# Patient Record
Sex: MALE | Race: WHITE | Employment: OTHER | URBAN - NONMETROPOLITAN AREA
[De-identification: names, ages, dates, MRNs, and addresses within clinical notes are randomized per-mention and may not be internally consistent; named-entity substitution may affect disease eponyms.]

---

## 2017-02-13 ENCOUNTER — OFFICE VISIT (OUTPATIENT)
Dept: PHYSICAL MEDICINE AND REHAB | Age: 57
End: 2017-02-13

## 2017-02-13 VITALS
HEART RATE: 75 BPM | SYSTOLIC BLOOD PRESSURE: 135 MMHG | DIASTOLIC BLOOD PRESSURE: 76 MMHG | BODY MASS INDEX: 36.73 KG/M2 | HEIGHT: 74 IN | WEIGHT: 286.2 LBS

## 2017-02-13 DIAGNOSIS — G35 MULTIPLE SCLEROSIS, RELAPSING-REMITTING (HCC): Primary | ICD-10-CM

## 2017-02-13 DIAGNOSIS — M62.838 MUSCLE SPASMS OF BOTH LOWER EXTREMITIES: ICD-10-CM

## 2017-02-13 PROCEDURE — G8484 FLU IMMUNIZE NO ADMIN: HCPCS | Performed by: PSYCHIATRY & NEUROLOGY

## 2017-02-13 PROCEDURE — 3017F COLORECTAL CA SCREEN DOC REV: CPT | Performed by: PSYCHIATRY & NEUROLOGY

## 2017-02-13 PROCEDURE — 99213 OFFICE O/P EST LOW 20 MIN: CPT | Performed by: PSYCHIATRY & NEUROLOGY

## 2017-02-13 PROCEDURE — G8427 DOCREV CUR MEDS BY ELIG CLIN: HCPCS | Performed by: PSYCHIATRY & NEUROLOGY

## 2017-02-13 PROCEDURE — 1036F TOBACCO NON-USER: CPT | Performed by: PSYCHIATRY & NEUROLOGY

## 2017-02-13 PROCEDURE — G8417 CALC BMI ABV UP PARAM F/U: HCPCS | Performed by: PSYCHIATRY & NEUROLOGY

## 2017-02-13 RX ORDER — ATORVASTATIN CALCIUM 20 MG/1
20 TABLET, FILM COATED ORAL DAILY
COMMUNITY
Start: 2017-02-02

## 2017-03-14 ENCOUNTER — TELEPHONE (OUTPATIENT)
Dept: PHYSICAL MEDICINE AND REHAB | Age: 57
End: 2017-03-14

## 2017-03-23 ENCOUNTER — INITIAL CONSULT (OUTPATIENT)
Dept: PULMONOLOGY | Age: 57
End: 2017-03-23

## 2017-03-23 VITALS
OXYGEN SATURATION: 92 % | HEART RATE: 71 BPM | HEIGHT: 73 IN | WEIGHT: 285 LBS | BODY MASS INDEX: 37.77 KG/M2 | DIASTOLIC BLOOD PRESSURE: 84 MMHG | SYSTOLIC BLOOD PRESSURE: 138 MMHG

## 2017-03-23 DIAGNOSIS — G47.19 EXCESSIVE DAYTIME SLEEPINESS: Primary | ICD-10-CM

## 2017-03-23 DIAGNOSIS — G47.33 OSA (OBSTRUCTIVE SLEEP APNEA): ICD-10-CM

## 2017-03-23 DIAGNOSIS — J43.1 PANLOBULAR EMPHYSEMA (HCC): ICD-10-CM

## 2017-03-23 DIAGNOSIS — R53.83 FATIGUE, UNSPECIFIED TYPE: ICD-10-CM

## 2017-03-23 DIAGNOSIS — G47.19 EXCESSIVE DAYTIME SLEEPINESS: ICD-10-CM

## 2017-03-23 PROCEDURE — G8417 CALC BMI ABV UP PARAM F/U: HCPCS | Performed by: INTERNAL MEDICINE

## 2017-03-23 PROCEDURE — G8484 FLU IMMUNIZE NO ADMIN: HCPCS | Performed by: INTERNAL MEDICINE

## 2017-03-23 PROCEDURE — 3023F SPIROM DOC REV: CPT | Performed by: INTERNAL MEDICINE

## 2017-03-23 PROCEDURE — G8926 SPIRO NO PERF OR DOC: HCPCS | Performed by: INTERNAL MEDICINE

## 2017-03-23 PROCEDURE — 3017F COLORECTAL CA SCREEN DOC REV: CPT | Performed by: INTERNAL MEDICINE

## 2017-03-23 PROCEDURE — G8428 CUR MEDS NOT DOCUMENT: HCPCS | Performed by: INTERNAL MEDICINE

## 2017-03-23 PROCEDURE — 1036F TOBACCO NON-USER: CPT | Performed by: INTERNAL MEDICINE

## 2017-03-23 PROCEDURE — 99204 OFFICE O/P NEW MOD 45 MIN: CPT | Performed by: INTERNAL MEDICINE

## 2017-03-23 PROCEDURE — 94620 6 MIN WALK TEST: CPT | Performed by: INTERNAL MEDICINE

## 2017-03-30 DIAGNOSIS — M62.838 MUSCLE SPASMS OF BOTH LOWER EXTREMITIES: ICD-10-CM

## 2017-03-30 DIAGNOSIS — G35 MULTIPLE SCLEROSIS, RELAPSING-REMITTING (HCC): ICD-10-CM

## 2017-03-30 RX ORDER — BACLOFEN 20 MG/1
20 TABLET ORAL 2 TIMES DAILY
Qty: 60 TABLET | Refills: 8 | Status: SHIPPED | OUTPATIENT
Start: 2017-03-30 | End: 2017-08-30 | Stop reason: SDUPTHER

## 2017-04-03 DIAGNOSIS — Z99.89 OSA ON CPAP: Primary | ICD-10-CM

## 2017-04-03 DIAGNOSIS — J43.1 PANLOBULAR EMPHYSEMA (HCC): ICD-10-CM

## 2017-04-03 DIAGNOSIS — G47.33 OSA ON CPAP: Primary | ICD-10-CM

## 2017-06-20 ENCOUNTER — OFFICE VISIT (OUTPATIENT)
Dept: PULMONOLOGY | Age: 57
End: 2017-06-20

## 2017-06-20 VITALS
HEIGHT: 74 IN | TEMPERATURE: 98.2 F | BODY MASS INDEX: 36.19 KG/M2 | DIASTOLIC BLOOD PRESSURE: 84 MMHG | SYSTOLIC BLOOD PRESSURE: 148 MMHG | WEIGHT: 282 LBS | HEART RATE: 75 BPM | OXYGEN SATURATION: 97 %

## 2017-06-20 DIAGNOSIS — M35.1 OVERLAP SYNDROME (HCC): ICD-10-CM

## 2017-06-20 DIAGNOSIS — G47.30 OBESITY WITH SLEEP APNEA: ICD-10-CM

## 2017-06-20 DIAGNOSIS — G47.33 OSA ON CPAP: Primary | ICD-10-CM

## 2017-06-20 DIAGNOSIS — Z99.89 OSA ON CPAP: Primary | ICD-10-CM

## 2017-06-20 DIAGNOSIS — I27.20 MILD PULMONARY HYPERTENSION (HCC): ICD-10-CM

## 2017-06-20 DIAGNOSIS — E66.9 OBESITY WITH SLEEP APNEA: ICD-10-CM

## 2017-06-20 DIAGNOSIS — J43.2 CENTRILOBULAR EMPHYSEMA (HCC): ICD-10-CM

## 2017-06-20 DIAGNOSIS — J96.11 CHRONIC HYPOXEMIC RESPIRATORY FAILURE (HCC): ICD-10-CM

## 2017-06-20 PROCEDURE — 3023F SPIROM DOC REV: CPT | Performed by: PHYSICIAN ASSISTANT

## 2017-06-20 PROCEDURE — G8926 SPIRO NO PERF OR DOC: HCPCS | Performed by: PHYSICIAN ASSISTANT

## 2017-06-20 PROCEDURE — G8427 DOCREV CUR MEDS BY ELIG CLIN: HCPCS | Performed by: PHYSICIAN ASSISTANT

## 2017-06-20 PROCEDURE — 3017F COLORECTAL CA SCREEN DOC REV: CPT | Performed by: PHYSICIAN ASSISTANT

## 2017-06-20 PROCEDURE — G8417 CALC BMI ABV UP PARAM F/U: HCPCS | Performed by: PHYSICIAN ASSISTANT

## 2017-06-20 PROCEDURE — 1036F TOBACCO NON-USER: CPT | Performed by: PHYSICIAN ASSISTANT

## 2017-06-20 PROCEDURE — 99215 OFFICE O/P EST HI 40 MIN: CPT | Performed by: PHYSICIAN ASSISTANT

## 2017-08-30 ENCOUNTER — OFFICE VISIT (OUTPATIENT)
Dept: NEUROLOGY | Age: 57
End: 2017-08-30
Payer: MEDICARE

## 2017-08-30 VITALS
BODY MASS INDEX: 38.83 KG/M2 | HEART RATE: 70 BPM | DIASTOLIC BLOOD PRESSURE: 84 MMHG | WEIGHT: 293 LBS | HEIGHT: 73 IN | SYSTOLIC BLOOD PRESSURE: 164 MMHG

## 2017-08-30 DIAGNOSIS — G35 MULTIPLE SCLEROSIS (HCC): Primary | ICD-10-CM

## 2017-08-30 DIAGNOSIS — M62.838 MUSCLE SPASMS OF BOTH LOWER EXTREMITIES: ICD-10-CM

## 2017-08-30 DIAGNOSIS — G35 MULTIPLE SCLEROSIS, RELAPSING-REMITTING (HCC): ICD-10-CM

## 2017-08-30 PROCEDURE — G8427 DOCREV CUR MEDS BY ELIG CLIN: HCPCS | Performed by: PSYCHIATRY & NEUROLOGY

## 2017-08-30 PROCEDURE — 99213 OFFICE O/P EST LOW 20 MIN: CPT | Performed by: PSYCHIATRY & NEUROLOGY

## 2017-08-30 PROCEDURE — G8417 CALC BMI ABV UP PARAM F/U: HCPCS | Performed by: PSYCHIATRY & NEUROLOGY

## 2017-08-30 PROCEDURE — 3017F COLORECTAL CA SCREEN DOC REV: CPT | Performed by: PSYCHIATRY & NEUROLOGY

## 2017-08-30 PROCEDURE — 1036F TOBACCO NON-USER: CPT | Performed by: PSYCHIATRY & NEUROLOGY

## 2017-08-30 RX ORDER — BACLOFEN 20 MG/1
20 TABLET ORAL 2 TIMES DAILY
Qty: 60 TABLET | Refills: 8 | Status: SHIPPED | OUTPATIENT
Start: 2017-08-30 | End: 2022-06-27 | Stop reason: SDUPTHER

## 2017-09-07 ENCOUNTER — HOSPITAL ENCOUNTER (OUTPATIENT)
Dept: MRI IMAGING | Age: 57
Discharge: HOME | End: 2017-09-07
Payer: MEDICARE

## 2017-09-07 DIAGNOSIS — G35 MULTIPLE SCLEROSIS (HCC): ICD-10-CM

## 2017-09-07 LAB — POC CREATININE WHOLE BLOOD: 0.7 MG/DL (ref 0.5–1.2)

## 2017-09-07 PROCEDURE — 82565 ASSAY OF CREATININE: CPT

## 2017-09-07 PROCEDURE — 70553 MRI BRAIN STEM W/O & W/DYE: CPT

## 2017-09-07 PROCEDURE — A9579 GAD-BASE MR CONTRAST NOS,1ML: HCPCS | Performed by: PSYCHIATRY & NEUROLOGY

## 2017-09-07 PROCEDURE — 6360000004 HC RX CONTRAST MEDICATION: Performed by: PSYCHIATRY & NEUROLOGY

## 2017-09-07 RX ADMIN — GADOTERIDOL 20 ML: 279.3 INJECTION, SOLUTION INTRAVENOUS at 08:06

## 2017-09-14 DIAGNOSIS — J43.2 CENTRILOBULAR EMPHYSEMA (HCC): ICD-10-CM

## 2017-09-14 DIAGNOSIS — M35.1 OVERLAP SYNDROME (HCC): ICD-10-CM

## 2017-09-14 DIAGNOSIS — J96.11 CHRONIC HYPOXEMIC RESPIRATORY FAILURE (HCC): ICD-10-CM

## 2017-09-14 DIAGNOSIS — G47.30 OBESITY WITH SLEEP APNEA: ICD-10-CM

## 2017-09-14 DIAGNOSIS — E66.9 OBESITY WITH SLEEP APNEA: ICD-10-CM

## 2017-09-19 ENCOUNTER — OFFICE VISIT (OUTPATIENT)
Dept: PULMONOLOGY | Age: 57
End: 2017-09-19
Payer: MEDICARE

## 2017-09-19 VITALS
BODY MASS INDEX: 37.74 KG/M2 | DIASTOLIC BLOOD PRESSURE: 88 MMHG | WEIGHT: 284.8 LBS | HEIGHT: 73 IN | HEART RATE: 68 BPM | SYSTOLIC BLOOD PRESSURE: 138 MMHG | OXYGEN SATURATION: 97 % | TEMPERATURE: 97.4 F

## 2017-09-19 DIAGNOSIS — J96.11 CHRONIC RESPIRATORY FAILURE WITH HYPOXIA (HCC): ICD-10-CM

## 2017-09-19 DIAGNOSIS — J43.2 CENTRILOBULAR EMPHYSEMA (HCC): Primary | ICD-10-CM

## 2017-09-19 DIAGNOSIS — Z99.89 OSA ON CPAP: ICD-10-CM

## 2017-09-19 DIAGNOSIS — G47.33 OSA ON CPAP: ICD-10-CM

## 2017-09-19 DIAGNOSIS — G35 MULTIPLE SCLEROSIS, RELAPSING-REMITTING (HCC): Chronic | ICD-10-CM

## 2017-09-19 PROCEDURE — G8926 SPIRO NO PERF OR DOC: HCPCS | Performed by: PHYSICIAN ASSISTANT

## 2017-09-19 PROCEDURE — 1036F TOBACCO NON-USER: CPT | Performed by: PHYSICIAN ASSISTANT

## 2017-09-19 PROCEDURE — 99214 OFFICE O/P EST MOD 30 MIN: CPT | Performed by: PHYSICIAN ASSISTANT

## 2017-09-19 PROCEDURE — G8417 CALC BMI ABV UP PARAM F/U: HCPCS | Performed by: PHYSICIAN ASSISTANT

## 2017-09-19 PROCEDURE — 3023F SPIROM DOC REV: CPT | Performed by: PHYSICIAN ASSISTANT

## 2017-09-19 PROCEDURE — 3017F COLORECTAL CA SCREEN DOC REV: CPT | Performed by: PHYSICIAN ASSISTANT

## 2017-09-19 PROCEDURE — G8427 DOCREV CUR MEDS BY ELIG CLIN: HCPCS | Performed by: PHYSICIAN ASSISTANT

## 2017-09-19 ASSESSMENT — ENCOUNTER SYMPTOMS
BACK PAIN: 0
HEMOPTYSIS: 0
GASTROINTESTINAL NEGATIVE: 1
SORE THROAT: 0
NAUSEA: 0
SPUTUM PRODUCTION: 0
WHEEZING: 1
VOMITING: 0
COUGH: 1
SHORTNESS OF BREATH: 1
HEARTBURN: 0
EYES NEGATIVE: 1

## 2017-09-20 ENCOUNTER — HOSPITAL ENCOUNTER (OUTPATIENT)
Dept: SLEEP CENTER | Age: 57
Discharge: HOME | End: 2017-09-20
Payer: MEDICARE

## 2017-09-20 VITALS — HEART RATE: 68 BPM | RESPIRATION RATE: 97 BRPM | BODY MASS INDEX: 37.64 KG/M2 | WEIGHT: 284 LBS | HEIGHT: 73 IN

## 2017-09-20 DIAGNOSIS — Z99.89 OSA ON CPAP: ICD-10-CM

## 2017-09-20 DIAGNOSIS — J96.11 CHRONIC RESPIRATORY FAILURE WITH HYPOXIA (HCC): ICD-10-CM

## 2017-09-20 DIAGNOSIS — G47.33 OSA ON CPAP: ICD-10-CM

## 2017-09-20 PROCEDURE — 95811 POLYSOM 6/>YRS CPAP 4/> PARM: CPT

## 2017-09-21 DIAGNOSIS — Z99.89 OSA ON CPAP: Primary | ICD-10-CM

## 2017-09-21 DIAGNOSIS — G47.33 OSA ON CPAP: Primary | ICD-10-CM

## 2017-09-21 LAB — STATUS: NORMAL

## 2017-09-28 ENCOUNTER — TELEPHONE (OUTPATIENT)
Dept: SLEEP CENTER | Age: 57
End: 2017-09-28

## 2017-11-06 RX ORDER — GLATIRAMER ACETATE 20 MG/ML
INJECTION, SOLUTION SUBCUTANEOUS
Qty: 30 KIT | Refills: 10 | Status: SHIPPED | OUTPATIENT
Start: 2017-11-06 | End: 2018-09-11 | Stop reason: SDUPTHER

## 2017-11-16 ENCOUNTER — OFFICE VISIT (OUTPATIENT)
Dept: PULMONOLOGY | Age: 57
End: 2017-11-16
Payer: MEDICARE

## 2017-11-16 VITALS
SYSTOLIC BLOOD PRESSURE: 132 MMHG | OXYGEN SATURATION: 96 % | DIASTOLIC BLOOD PRESSURE: 78 MMHG | BODY MASS INDEX: 38.43 KG/M2 | WEIGHT: 290 LBS | HEIGHT: 73 IN | HEART RATE: 67 BPM

## 2017-11-16 DIAGNOSIS — G35 MULTIPLE SCLEROSIS, RELAPSING-REMITTING (HCC): Chronic | ICD-10-CM

## 2017-11-16 DIAGNOSIS — G47.33 OSA ON CPAP: Primary | ICD-10-CM

## 2017-11-16 DIAGNOSIS — J96.11 CHRONIC RESPIRATORY FAILURE WITH HYPOXIA (HCC): ICD-10-CM

## 2017-11-16 DIAGNOSIS — J43.2 CENTRILOBULAR EMPHYSEMA (HCC): ICD-10-CM

## 2017-11-16 DIAGNOSIS — Z99.89 OSA ON CPAP: Primary | ICD-10-CM

## 2017-11-16 PROCEDURE — 3023F SPIROM DOC REV: CPT | Performed by: PHYSICIAN ASSISTANT

## 2017-11-16 PROCEDURE — 1036F TOBACCO NON-USER: CPT | Performed by: PHYSICIAN ASSISTANT

## 2017-11-16 PROCEDURE — G8417 CALC BMI ABV UP PARAM F/U: HCPCS | Performed by: PHYSICIAN ASSISTANT

## 2017-11-16 PROCEDURE — 99214 OFFICE O/P EST MOD 30 MIN: CPT | Performed by: PHYSICIAN ASSISTANT

## 2017-11-16 PROCEDURE — G8926 SPIRO NO PERF OR DOC: HCPCS | Performed by: PHYSICIAN ASSISTANT

## 2017-11-16 PROCEDURE — G8484 FLU IMMUNIZE NO ADMIN: HCPCS | Performed by: PHYSICIAN ASSISTANT

## 2017-11-16 PROCEDURE — G8427 DOCREV CUR MEDS BY ELIG CLIN: HCPCS | Performed by: PHYSICIAN ASSISTANT

## 2017-11-16 PROCEDURE — 3017F COLORECTAL CA SCREEN DOC REV: CPT | Performed by: PHYSICIAN ASSISTANT

## 2017-11-16 NOTE — PROGRESS NOTES
Medical History:   Diagnosis Date    Adjustment disorder     Bypass graft stenosis ([facility] 2008    Chronic fatigue     COPD (chronic obstructive pulmonary disease) (HCC)     Demyelinating disease of central nervous system ([facility]     Depression     Essential hypertension     Hyperlipidemia     Hypertension     Hypoxia     Multiple sclerosis ([facility] 2012    RODRIGUE (obstructive sleep apnea)     Post herpetic neuralgia     Reactive airway disease     unspecified asthma severity    Type II or unspecified type diabetes mellitus without mention of complication, not stated as uncontrolled        Past Surgical History:   Procedure Laterality Date    CORONARY ARTERY BYPASS GRAFT      HERNIA REPAIR         Social History   Substance Use Topics    Smoking status: Former Smoker     Types: Cigarettes     Quit date: 9/27/2008    Smokeless tobacco: Never Used    Alcohol use Yes      Comment: occas.        No Known Allergies    Current Outpatient Prescriptions   Medication Sig Dispense Refill    COPAXONE 20 MG/ML injection INJECT 1ML SUBCUTANEOUSLY DAILY -REFRIGERATE DO NOT FREEZE 30 kit 10    baclofen (LIORESAL) 20 MG tablet Take 1 tablet by mouth 2 times daily 60 tablet 8    Umeclidinium Bromide (INCRUSE ELLIPTA) 62.5 MCG/INH AEPB Inhale 1 puff into the lungs daily 1 each 11    atorvastatin (LIPITOR) 20 MG tablet Take 20 mg by mouth daily      Multiple Vitamin (MULTI VITAMIN DAILY PO) Take by mouth daily      albuterol (PROVENTIL) (2.5 MG/3ML) 0.083% nebulizer solution Take 2.5 mg by nebulization 4 times daily      SYMBICORT 160-4.5 MCG/ACT AERO       amLODIPine (NORVASC) 5 MG tablet Take 10 mg by mouth daily       losartan (COZAAR) 100 MG tablet Take 100 mg by mouth daily       modafinil (PROVIGIL) 200 MG tablet Take 200 mg by mouth daily       TRAMADOL HCL 50 mg by Does not apply route 2 times daily       metformin (GLUMETZA) 1000 MG ER tablet Take 1,000 mg by mouth 2 times daily (with meals)       Mental status: Alert, oriented, thought content appropriate      ASSESSMENT/DIAGNOSIS    1. RODRIGUE on CPAP     2. Chronic respiratory failure with hypoxia (HCC)     3. Centrilobular emphysema ([address]     4. Multiple sclerosis, relapsing-remitting (HCC)              Plan   - Continue Incruse and Symbicort  - Discontinue Ramp  - Continue O2 with exertion   - He  was advised to continue current positive airway pressure therapy with above described pressure. - He  advised to keep good compliance with current recommended pressure to get optimal results and clinical improvement  - Recommend 7-9 hours of sleep with PAP  - He was advised to call [employer] regarding supplies if needed. - He call my office for earlier appointment if needed for worsening of sleep symptoms.   - He was instructed on weight loss  - Anice All was educated about my impression and plan. Patient verbalizes understanding.   We will see Alena Wall Fee back in: 6 months with download    Christine Bermudez PA-C, KATHI  11/16/2017

## 2018-02-28 ENCOUNTER — OFFICE VISIT (OUTPATIENT)
Dept: NEUROLOGY | Age: 58
End: 2018-02-28
Payer: MEDICARE

## 2018-02-28 VITALS
BODY MASS INDEX: 39.49 KG/M2 | SYSTOLIC BLOOD PRESSURE: 138 MMHG | WEIGHT: 298 LBS | HEART RATE: 66 BPM | HEIGHT: 73 IN | DIASTOLIC BLOOD PRESSURE: 76 MMHG

## 2018-02-28 DIAGNOSIS — G35 MULTIPLE SCLEROSIS (HCC): Primary | ICD-10-CM

## 2018-02-28 DIAGNOSIS — M62.838 MUSCLE SPASMS OF BOTH LOWER EXTREMITIES: ICD-10-CM

## 2018-02-28 PROCEDURE — G8427 DOCREV CUR MEDS BY ELIG CLIN: HCPCS | Performed by: PSYCHIATRY & NEUROLOGY

## 2018-02-28 PROCEDURE — 1036F TOBACCO NON-USER: CPT | Performed by: PSYCHIATRY & NEUROLOGY

## 2018-02-28 PROCEDURE — G8484 FLU IMMUNIZE NO ADMIN: HCPCS | Performed by: PSYCHIATRY & NEUROLOGY

## 2018-02-28 PROCEDURE — 99213 OFFICE O/P EST LOW 20 MIN: CPT | Performed by: PSYCHIATRY & NEUROLOGY

## 2018-02-28 PROCEDURE — G8417 CALC BMI ABV UP PARAM F/U: HCPCS | Performed by: PSYCHIATRY & NEUROLOGY

## 2018-02-28 PROCEDURE — 3017F COLORECTAL CA SCREEN DOC REV: CPT | Performed by: PSYCHIATRY & NEUROLOGY

## 2018-02-28 RX ORDER — LINAGLIPTIN AND METFORMIN HYDROCHLORIDE 2.5; 1 MG/1; MG/1
TABLET, FILM COATED ORAL
COMMUNITY
Start: 2018-01-05

## 2018-02-28 NOTE — PROGRESS NOTES
NEUROLOGY OUT PATIENT FOLLOW UP NOTE:  2/28/201810:39 AM    Reese Reyes is here for follow up for   Patient Active Problem List   Diagnosis    [facility]remitting [facility]    Fatigue    Centrilobular emphysema ([facility]    RODRIGUE on CPAP    Chronic respiratory failure with hypoxia ([facility]   Follow up for multiple sclerosis. He reports having balance trouble when walking long distances. He feels his left leg drags at times when he is tired, he is interested in a brace. He is on nasal oxygen. He is on Copaxone 20 mg SC daily. His leg spasms are better. His sleep is better. Her is also on calcium and vitamin D. He reports no urge incontinence. ROS:  Skin: no rash. Cardiac: no chest pain. No palpitations. Renal : no flank pain, no hematuria      No Known Allergies    Current Outpatient Prescriptions:     JENTADUETO 2.5-1000 MG TABS, , Disp: , Rfl:     COPAXONE 20 MG/ML injection, INJECT 1ML SUBCUTANEOUSLY DAILY -REFRIGERATE DO NOT FREEZE, Disp: 30 kit, Rfl: 10    baclofen (LIORESAL) 20 MG tablet, Take 1 tablet by mouth 2 times daily, Disp: 60 tablet, Rfl: 8    Umeclidinium Bromide (INCRUSE ELLIPTA) 62.5 MCG/INH AEPB, Inhale 1 puff into the lungs daily, Disp: 1 each, Rfl: 11    atorvastatin (LIPITOR) 20 MG tablet, Take 20 mg by mouth daily, Disp: , Rfl:     albuterol (PROVENTIL) (2.5 MG/3ML) 0.083% nebulizer solution, Take 2.5 mg by nebulization 4 times daily, Disp: , Rfl:     SYMBICORT 160-4.5 MCG/ACT AERO, , Disp: , Rfl:     amLODIPine (NORVASC) 5 MG tablet, Take 10 mg by mouth daily , Disp: , Rfl:     losartan (COZAAR) 100 MG tablet, Take 100 mg by mouth daily , Disp: , Rfl:     modafinil (PROVIGIL) 200 MG tablet, Take 200 mg by mouth daily , Disp: , Rfl:     TRAMADOL HCL, 50 mg by Does not apply route 2 times daily , Disp: , Rfl:     metoprolol (LOPRESSOR) 50 MG tablet, Take 50 mg by mouth 2 times daily.   , Disp: , Rfl:     glimepiride (AMARYL) 4 MG tablet, Take 4 mg by mouth 2 times matter    lesions are present.       On the gradient echo T2-weighted images, there is no susceptibility artifact present.       There is no abnormal enhancement in the brain. There is no evidence of active demyelination. The flow voids are normal.  The midline craniocervical junction structures are normal.  The brainstem and pituitary gland are normal.       There is no significant change in the appearance of the brain compared to the prior study.       There is a trace amount of fluid signal in the left mastoid air cells.           Impression       1. Mild amount of abnormal signal scattered in the white matter of the brain. This is stable and consistent with multiple sclerosis. There is no evidence of active demyelination. Assessment:    1. Multiple sclerosis ([address]     2. Muscle spasms of both lower extremities      He reports feels weaker and drags his left foot when walking distances. He is interested in Brace for the left leg. I reviewed the MRI brain done 7/2017 and agree with interpretation being stable. I will refer him to [facility] for evaluation. He denies any recent fall, however he trips and has near miss on falls. He is on Copaxone 20 mg SC daily. He exam is unchanged. After a discussion with patient we agreed on the following plan. Plan:  1. Referral to [facility] re left foot brace evaluation. 2. Continue Copaxone to 20 mg subcutaneous daily. 3. Continue Baclofen to 20 mg twice a day. Refills given. 4. Take calcium and Vitamin D daily. Exercise regularly. 5.  Follow up in 2 months or sooner if needed. 6. Report any new events. Call if any questions or concerns. Please call if any questions.      Bonny Thomas MD

## 2018-02-28 NOTE — PATIENT INSTRUCTIONS
1.  Referral to [facility] re left foot brace evaluation. 2. Continue Copaxone to 20 mg subcutaneous daily. 3. Continue Baclofen to 20 mg twice a day. Refills given. 4. Take calcium and Vitamin D daily. Exercise regularly. 5.  Follow up in 2 months or sooner if needed. 6. Report any new events. Call if any questions or concerns.

## 2018-03-21 ENCOUNTER — OFFICE VISIT (OUTPATIENT)
Dept: PHYSICAL MEDICINE AND REHAB | Age: 58
End: 2018-03-21
Payer: MEDICARE

## 2018-03-21 VITALS
DIASTOLIC BLOOD PRESSURE: 79 MMHG | WEIGHT: 298.06 LBS | BODY MASS INDEX: 39.5 KG/M2 | HEART RATE: 67 BPM | HEIGHT: 73 IN | SYSTOLIC BLOOD PRESSURE: 129 MMHG

## 2018-03-21 DIAGNOSIS — R26.9 NEUROLOGIC GAIT DYSFUNCTION: ICD-10-CM

## 2018-03-21 DIAGNOSIS — G35 MULTIPLE SCLEROSIS, RELAPSING-REMITTING (HCC): Primary | Chronic | ICD-10-CM

## 2018-03-21 PROCEDURE — G8484 FLU IMMUNIZE NO ADMIN: HCPCS | Performed by: PHYSICAL MEDICINE & REHABILITATION

## 2018-03-21 PROCEDURE — G8417 CALC BMI ABV UP PARAM F/U: HCPCS | Performed by: PHYSICAL MEDICINE & REHABILITATION

## 2018-03-21 PROCEDURE — 1036F TOBACCO NON-USER: CPT | Performed by: PHYSICAL MEDICINE & REHABILITATION

## 2018-03-21 PROCEDURE — 3017F COLORECTAL CA SCREEN DOC REV: CPT | Performed by: PHYSICAL MEDICINE & REHABILITATION

## 2018-03-21 PROCEDURE — 99203 OFFICE O/P NEW LOW 30 MIN: CPT | Performed by: PHYSICAL MEDICINE & REHABILITATION

## 2018-03-21 PROCEDURE — G8427 DOCREV CUR MEDS BY ELIG CLIN: HCPCS | Performed by: PHYSICAL MEDICINE & REHABILITATION

## 2018-03-23 PROBLEM — R26.9 NEUROLOGIC GAIT DYSFUNCTION: Status: ACTIVE | Noted: 2018-03-23

## 2018-03-23 ASSESSMENT — ENCOUNTER SYMPTOMS
STRIDOR: 0
BLOOD IN STOOL: 0
DIARRHEA: 0
ABDOMINAL PAIN: 0
COUGH: 0
PHOTOPHOBIA: 0
SHORTNESS OF BREATH: 1
ORTHOPNEA: 0
BLURRED VISION: 0
HEMOPTYSIS: 0
SORE THROAT: 0
WHEEZING: 0
SPUTUM PRODUCTION: 0
DOUBLE VISION: 0
CONSTIPATION: 0
VOMITING: 0
BACK PAIN: 1
HEARTBURN: 0
NAUSEA: 0

## 2018-03-23 NOTE — PROGRESS NOTES
-REFRIGERATE DO NOT FREEZE, Disp: 30 kit, Rfl: 10    baclofen (LIORESAL) 20 MG tablet, Take 1 tablet by mouth 2 times daily, Disp: 60 tablet, Rfl: 8    Umeclidinium Bromide (INCRUSE ELLIPTA) 62.5 MCG/INH AEPB, Inhale 1 puff into the lungs daily, Disp: 1 each, Rfl: 11    atorvastatin (LIPITOR) 20 MG tablet, Take 20 mg by mouth daily, Disp: , Rfl:     Multiple Vitamin (MULTI VITAMIN DAILY PO), Take by mouth daily, Disp: , Rfl:     albuterol (PROVENTIL) (2.5 MG/3ML) 0.083% nebulizer solution, Take 2.5 mg by nebulization 4 times daily, Disp: , Rfl:     SYMBICORT 160-4.5 MCG/ACT AERO, , Disp: , Rfl:     amLODIPine (NORVASC) 5 MG tablet, Take 10 mg by mouth daily , Disp: , Rfl:     losartan (COZAAR) 100 MG tablet, Take 100 mg by mouth daily , Disp: , Rfl:     modafinil (PROVIGIL) 200 MG tablet, Take 200 mg by mouth daily , Disp: , Rfl:     TRAMADOL HCL, 50 mg by Does not apply route 2 times daily , Disp: , Rfl:     metformin (GLUMETZA) 1000 MG ER tablet, Take 1,000 mg by mouth 2 times daily (with meals) , Disp: , Rfl:     metoprolol (LOPRESSOR) 50 MG tablet, Take 50 mg by mouth 2 times daily. , Disp: , Rfl:     glimepiride (AMARYL) 4 MG tablet, Take 4 mg by mouth 2 times daily , Disp: , Rfl:     cloNIDine (CATAPRES) 0.1 MG tablet, Take 0.1 mg by mouth as needed. , Disp: , Rfl:     aspirin 81 MG tablet, Take 325 mg by mouth daily , Disp: , Rfl:      Social History:  Social History     Social History    Marital status:      Spouse name: N/A    Number of children: N/A    Years of education: N/A     Occupational History    Not on file. Social History Main Topics    Smoking status: Former Smoker     Types: Cigarettes     Quit date: 9/27/2008    Smokeless tobacco: Never Used    Alcohol use Yes      Comment: occas.     Drug use: No      Comment: younger years    Sexual activity: Not on file     Other Topics Concern    Not on file     Social History Narrative    No narrative on file ADL's: He is independent with his A.D.L.'s but requires additional time to complete all of his functional tasks. Family History:   family history includes Arthritis in his father and mother; Depression in his brother; Diabetes in his brother, father, mother, paternal grandfather, paternal grandmother, and sister; Heart Disease in his father; High Blood Pressure in his brother and father. Review of Systems:  Review of Systems   Constitutional: Positive for malaise/fatigue. Negative for chills, diaphoresis, fever and weight loss. HENT: Negative for hearing loss, nosebleeds, sore throat and tinnitus. Eyes: Negative for blurred vision, double vision and photophobia. Respiratory: Positive for shortness of breath. Negative for cough, hemoptysis, sputum production, wheezing and stridor. Cardiovascular: Negative for chest pain, palpitations, orthopnea, claudication, leg swelling and PND. Gastrointestinal: Negative for abdominal pain, blood in stool, constipation, diarrhea, heartburn, melena, nausea and vomiting. Genitourinary: Positive for urgency. Negative for dysuria, frequency and hematuria. Musculoskeletal: Positive for back pain, joint pain and myalgias. Negative for falls and neck pain. Skin: Negative for itching and rash. Neurological: Positive for sensory change, speech change and weakness. Negative for dizziness, tingling, tremors, focal weakness, seizures, loss of consciousness and headaches. Endo/Heme/Allergies: Negative for polydipsia. Does not bruise/bleed easily. Psychiatric/Behavioral: Positive for memory loss. Negative for depression, hallucinations, substance abuse and suicidal ideas. The patient is not nervous/anxious and does not have insomnia.         Physical Exam:  /79 (Site: Right Arm, Position: Sitting)   Pulse 67   Ht 6' 0.99\" (1.854 m)   Wt 298 lb 1 oz (135.2 kg)   BMI 39.33 kg/m²     He was noted to be awake, alert, and in no acute

## 2018-04-04 ENCOUNTER — OFFICE VISIT (OUTPATIENT)
Dept: PHYSICAL MEDICINE AND REHAB | Age: 58
End: 2018-04-04
Payer: MEDICARE

## 2018-04-04 VITALS
BODY MASS INDEX: 38.57 KG/M2 | WEIGHT: 291 LBS | HEART RATE: 67 BPM | SYSTOLIC BLOOD PRESSURE: 138 MMHG | HEIGHT: 73 IN | DIASTOLIC BLOOD PRESSURE: 79 MMHG

## 2018-04-04 DIAGNOSIS — G35 MULTIPLE SCLEROSIS, RELAPSING-REMITTING (HCC): Primary | Chronic | ICD-10-CM

## 2018-04-04 PROCEDURE — 3017F COLORECTAL CA SCREEN DOC REV: CPT | Performed by: PHYSICAL MEDICINE & REHABILITATION

## 2018-04-04 PROCEDURE — G8417 CALC BMI ABV UP PARAM F/U: HCPCS | Performed by: PHYSICAL MEDICINE & REHABILITATION

## 2018-04-04 PROCEDURE — 1036F TOBACCO NON-USER: CPT | Performed by: PHYSICAL MEDICINE & REHABILITATION

## 2018-04-04 PROCEDURE — G8427 DOCREV CUR MEDS BY ELIG CLIN: HCPCS | Performed by: PHYSICAL MEDICINE & REHABILITATION

## 2018-04-04 PROCEDURE — 99214 OFFICE O/P EST MOD 30 MIN: CPT | Performed by: PHYSICAL MEDICINE & REHABILITATION

## 2018-04-08 ASSESSMENT — ENCOUNTER SYMPTOMS
CONSTIPATION: 0
SHORTNESS OF BREATH: 0
ORTHOPNEA: 0
DIARRHEA: 0
VOMITING: 0
SORE THROAT: 0
DOUBLE VISION: 0
NAUSEA: 0
PHOTOPHOBIA: 0
HEMOPTYSIS: 0
COUGH: 0
BLURRED VISION: 0
BLOOD IN STOOL: 0
HEARTBURN: 0
BACK PAIN: 1
ABDOMINAL PAIN: 0
SPUTUM PRODUCTION: 0
WHEEZING: 0
STRIDOR: 0

## 2018-05-03 ENCOUNTER — OFFICE VISIT (OUTPATIENT)
Dept: PHYSICAL MEDICINE AND REHAB | Age: 58
End: 2018-05-03
Payer: MEDICARE

## 2018-05-03 VITALS
DIASTOLIC BLOOD PRESSURE: 77 MMHG | SYSTOLIC BLOOD PRESSURE: 134 MMHG | HEART RATE: 68 BPM | WEIGHT: 291 LBS | BODY MASS INDEX: 38.57 KG/M2 | HEIGHT: 73 IN

## 2018-05-03 DIAGNOSIS — G35 MULTIPLE SCLEROSIS, RELAPSING-REMITTING (HCC): Primary | ICD-10-CM

## 2018-05-03 DIAGNOSIS — R26.9 NEUROLOGIC GAIT DYSFUNCTION: ICD-10-CM

## 2018-05-03 PROCEDURE — G8417 CALC BMI ABV UP PARAM F/U: HCPCS | Performed by: NURSE PRACTITIONER

## 2018-05-03 PROCEDURE — 1036F TOBACCO NON-USER: CPT | Performed by: NURSE PRACTITIONER

## 2018-05-03 PROCEDURE — 99213 OFFICE O/P EST LOW 20 MIN: CPT | Performed by: NURSE PRACTITIONER

## 2018-05-03 PROCEDURE — 3017F COLORECTAL CA SCREEN DOC REV: CPT | Performed by: NURSE PRACTITIONER

## 2018-05-03 PROCEDURE — G8427 DOCREV CUR MEDS BY ELIG CLIN: HCPCS | Performed by: NURSE PRACTITIONER

## 2018-05-09 ENCOUNTER — OFFICE VISIT (OUTPATIENT)
Dept: NEUROLOGY | Age: 58
End: 2018-05-09
Payer: MEDICARE

## 2018-05-09 VITALS
BODY MASS INDEX: 38.04 KG/M2 | WEIGHT: 287 LBS | HEIGHT: 73 IN | HEART RATE: 68 BPM | DIASTOLIC BLOOD PRESSURE: 86 MMHG | SYSTOLIC BLOOD PRESSURE: 138 MMHG

## 2018-05-09 DIAGNOSIS — G35 MULTIPLE SCLEROSIS (HCC): Primary | ICD-10-CM

## 2018-05-09 DIAGNOSIS — M62.838 MUSCLE SPASMS OF BOTH LOWER EXTREMITIES: ICD-10-CM

## 2018-05-09 PROCEDURE — G8417 CALC BMI ABV UP PARAM F/U: HCPCS | Performed by: PSYCHIATRY & NEUROLOGY

## 2018-05-09 PROCEDURE — 1036F TOBACCO NON-USER: CPT | Performed by: PSYCHIATRY & NEUROLOGY

## 2018-05-09 PROCEDURE — 99213 OFFICE O/P EST LOW 20 MIN: CPT | Performed by: PSYCHIATRY & NEUROLOGY

## 2018-05-09 PROCEDURE — G8427 DOCREV CUR MEDS BY ELIG CLIN: HCPCS | Performed by: PSYCHIATRY & NEUROLOGY

## 2018-05-09 PROCEDURE — 3017F COLORECTAL CA SCREEN DOC REV: CPT | Performed by: PSYCHIATRY & NEUROLOGY

## 2018-05-11 ENCOUNTER — HOSPITAL ENCOUNTER (OUTPATIENT)
Dept: PHYSICAL THERAPY | Age: 58
Setting detail: THERAPIES SERIES
Discharge: HOME | End: 2018-05-11
Payer: MEDICARE

## 2018-05-11 PROCEDURE — G8979 MOBILITY GOAL STATUS: HCPCS

## 2018-05-11 PROCEDURE — 97110 THERAPEUTIC EXERCISES: CPT

## 2018-05-11 PROCEDURE — 97161 PT EVAL LOW COMPLEX 20 MIN: CPT

## 2018-05-11 PROCEDURE — G8978 MOBILITY CURRENT STATUS: HCPCS

## 2018-05-11 ASSESSMENT — PAIN DESCRIPTION - PAIN TYPE: TYPE: ACUTE PAIN

## 2018-05-11 ASSESSMENT — PAIN DESCRIPTION - LOCATION: LOCATION: BACK;LEG

## 2018-05-11 ASSESSMENT — PAIN DESCRIPTION - ORIENTATION: ORIENTATION: RIGHT;LEFT

## 2018-05-11 ASSESSMENT — PAIN SCALES - GENERAL: PAINLEVEL_OUTOF10: 7

## 2018-05-15 ENCOUNTER — HOSPITAL ENCOUNTER (OUTPATIENT)
Dept: PHYSICAL THERAPY | Age: 58
Setting detail: THERAPIES SERIES
Discharge: HOME | End: 2018-05-15
Payer: MEDICARE

## 2018-05-15 PROCEDURE — 97116 GAIT TRAINING THERAPY: CPT

## 2018-05-15 PROCEDURE — 97110 THERAPEUTIC EXERCISES: CPT

## 2018-05-15 ASSESSMENT — PAIN SCALES - GENERAL: PAINLEVEL_OUTOF10: 2

## 2018-05-16 ENCOUNTER — HOSPITAL ENCOUNTER (OUTPATIENT)
Dept: PHYSICAL THERAPY | Age: 58
Setting detail: THERAPIES SERIES
Discharge: HOME | End: 2018-05-16
Payer: MEDICARE

## 2018-05-16 PROCEDURE — 97110 THERAPEUTIC EXERCISES: CPT

## 2018-05-16 ASSESSMENT — PAIN SCALES - GENERAL: PAINLEVEL_OUTOF10: 2

## 2018-05-16 ASSESSMENT — PAIN DESCRIPTION - PAIN TYPE: TYPE: ACUTE PAIN

## 2018-05-16 ASSESSMENT — PAIN DESCRIPTION - ORIENTATION: ORIENTATION: LEFT;RIGHT

## 2018-05-16 ASSESSMENT — PAIN DESCRIPTION - LOCATION: LOCATION: BACK;LEG

## 2018-05-17 ENCOUNTER — OFFICE VISIT (OUTPATIENT)
Dept: PULMONOLOGY | Age: 58
End: 2018-05-17
Payer: MEDICARE

## 2018-05-17 VITALS
SYSTOLIC BLOOD PRESSURE: 136 MMHG | HEIGHT: 73 IN | DIASTOLIC BLOOD PRESSURE: 78 MMHG | OXYGEN SATURATION: 97 % | HEART RATE: 67 BPM | RESPIRATION RATE: 16 BRPM | WEIGHT: 280 LBS | BODY MASS INDEX: 37.11 KG/M2

## 2018-05-17 DIAGNOSIS — J96.11 CHRONIC RESPIRATORY FAILURE WITH HYPOXIA (HCC): ICD-10-CM

## 2018-05-17 DIAGNOSIS — J43.2 CENTRILOBULAR EMPHYSEMA (HCC): ICD-10-CM

## 2018-05-17 DIAGNOSIS — G47.33 OSA ON CPAP: Primary | ICD-10-CM

## 2018-05-17 DIAGNOSIS — Z99.89 OSA ON CPAP: Primary | ICD-10-CM

## 2018-05-17 PROCEDURE — 3023F SPIROM DOC REV: CPT | Performed by: PHYSICIAN ASSISTANT

## 2018-05-17 PROCEDURE — 99214 OFFICE O/P EST MOD 30 MIN: CPT | Performed by: PHYSICIAN ASSISTANT

## 2018-05-17 PROCEDURE — 3017F COLORECTAL CA SCREEN DOC REV: CPT | Performed by: PHYSICIAN ASSISTANT

## 2018-05-17 PROCEDURE — 1036F TOBACCO NON-USER: CPT | Performed by: PHYSICIAN ASSISTANT

## 2018-05-17 PROCEDURE — G8926 SPIRO NO PERF OR DOC: HCPCS | Performed by: PHYSICIAN ASSISTANT

## 2018-05-17 PROCEDURE — G8417 CALC BMI ABV UP PARAM F/U: HCPCS | Performed by: PHYSICIAN ASSISTANT

## 2018-05-17 PROCEDURE — G8427 DOCREV CUR MEDS BY ELIG CLIN: HCPCS | Performed by: PHYSICIAN ASSISTANT

## 2018-05-17 ASSESSMENT — ENCOUNTER SYMPTOMS
COUGH: 1
NAUSEA: 0
SINUS PAIN: 0
HEARTBURN: 0
SPUTUM PRODUCTION: 0
SORE THROAT: 0
GASTROINTESTINAL NEGATIVE: 1
ORTHOPNEA: 0
EYES NEGATIVE: 1
SHORTNESS OF BREATH: 1
WHEEZING: 0

## 2018-05-18 ENCOUNTER — HOSPITAL ENCOUNTER (OUTPATIENT)
Dept: PHYSICAL THERAPY | Age: 58
Setting detail: THERAPIES SERIES
Discharge: HOME | End: 2018-05-18
Payer: MEDICARE

## 2018-05-18 PROCEDURE — 97110 THERAPEUTIC EXERCISES: CPT

## 2018-05-21 ENCOUNTER — HOSPITAL ENCOUNTER (OUTPATIENT)
Dept: PHYSICAL THERAPY | Age: 58
Setting detail: THERAPIES SERIES
Discharge: HOME | End: 2018-05-21
Payer: MEDICARE

## 2018-05-21 PROCEDURE — 97110 THERAPEUTIC EXERCISES: CPT

## 2018-05-21 ASSESSMENT — PAIN SCALES - GENERAL: PAINLEVEL_OUTOF10: 3

## 2018-05-21 ASSESSMENT — PAIN DESCRIPTION - PAIN TYPE: TYPE: CHRONIC PAIN

## 2018-05-23 ENCOUNTER — HOSPITAL ENCOUNTER (OUTPATIENT)
Dept: PHYSICAL THERAPY | Age: 58
Setting detail: THERAPIES SERIES
Discharge: HOME | End: 2018-05-23
Payer: MEDICARE

## 2018-05-23 PROCEDURE — 97110 THERAPEUTIC EXERCISES: CPT

## 2018-05-23 ASSESSMENT — PAIN DESCRIPTION - LOCATION: LOCATION: OTHER (COMMENT)

## 2018-05-23 ASSESSMENT — PAIN DESCRIPTION - PAIN TYPE: TYPE: CHRONIC PAIN

## 2018-05-23 ASSESSMENT — PAIN SCALES - GENERAL: PAINLEVEL_OUTOF10: 2

## 2018-05-25 ENCOUNTER — HOSPITAL ENCOUNTER (OUTPATIENT)
Dept: PHYSICAL THERAPY | Age: 58
Setting detail: THERAPIES SERIES
Discharge: HOME | End: 2018-05-25
Payer: MEDICARE

## 2018-05-25 PROCEDURE — 97110 THERAPEUTIC EXERCISES: CPT

## 2018-05-30 ENCOUNTER — HOSPITAL ENCOUNTER (OUTPATIENT)
Dept: PHYSICAL THERAPY | Age: 58
Setting detail: THERAPIES SERIES
Discharge: HOME | End: 2018-05-30
Payer: MEDICARE

## 2018-05-30 PROCEDURE — G8978 MOBILITY CURRENT STATUS: HCPCS

## 2018-05-30 PROCEDURE — 97110 THERAPEUTIC EXERCISES: CPT

## 2018-05-30 PROCEDURE — G8979 MOBILITY GOAL STATUS: HCPCS

## 2018-06-04 ENCOUNTER — HOSPITAL ENCOUNTER (OUTPATIENT)
Dept: PHYSICAL THERAPY | Age: 58
Setting detail: THERAPIES SERIES
Discharge: HOME | End: 2018-06-04
Payer: MEDICARE

## 2018-06-04 PROCEDURE — 97110 THERAPEUTIC EXERCISES: CPT

## 2018-06-04 ASSESSMENT — PAIN SCALES - GENERAL: PAINLEVEL_OUTOF10: 3

## 2018-06-06 ENCOUNTER — HOSPITAL ENCOUNTER (OUTPATIENT)
Dept: PHYSICAL THERAPY | Age: 58
Setting detail: THERAPIES SERIES
Discharge: HOME | End: 2018-06-06
Payer: MEDICARE

## 2018-06-06 PROCEDURE — 97110 THERAPEUTIC EXERCISES: CPT

## 2018-06-06 ASSESSMENT — PAIN DESCRIPTION - LOCATION: LOCATION: GENERALIZED

## 2018-06-06 ASSESSMENT — PAIN SCALES - GENERAL: PAINLEVEL_OUTOF10: 3

## 2018-06-06 ASSESSMENT — PAIN DESCRIPTION - ORIENTATION: ORIENTATION: LEFT;RIGHT

## 2018-06-06 ASSESSMENT — PAIN DESCRIPTION - PAIN TYPE: TYPE: CHRONIC PAIN

## 2018-06-08 ENCOUNTER — HOSPITAL ENCOUNTER (OUTPATIENT)
Dept: PHYSICAL THERAPY | Age: 58
Setting detail: THERAPIES SERIES
Discharge: HOME | End: 2018-06-08
Payer: MEDICARE

## 2018-06-08 PROCEDURE — 97110 THERAPEUTIC EXERCISES: CPT

## 2018-06-08 ASSESSMENT — PAIN DESCRIPTION - PAIN TYPE: TYPE: CHRONIC PAIN

## 2018-06-08 ASSESSMENT — PAIN SCALES - GENERAL: PAINLEVEL_OUTOF10: 3

## 2018-06-08 ASSESSMENT — PAIN DESCRIPTION - LOCATION: LOCATION: GENERALIZED

## 2018-06-11 ENCOUNTER — HOSPITAL ENCOUNTER (OUTPATIENT)
Dept: PHYSICAL THERAPY | Age: 58
Setting detail: THERAPIES SERIES
Discharge: HOME | End: 2018-06-11
Payer: MEDICARE

## 2018-06-11 PROCEDURE — 97110 THERAPEUTIC EXERCISES: CPT

## 2018-06-11 ASSESSMENT — PAIN DESCRIPTION - ORIENTATION: ORIENTATION: RIGHT;LEFT

## 2018-06-11 ASSESSMENT — PAIN DESCRIPTION - LOCATION: LOCATION: BACK;LEG

## 2018-06-11 ASSESSMENT — PAIN SCALES - GENERAL: PAINLEVEL_OUTOF10: 3

## 2018-06-11 ASSESSMENT — PAIN DESCRIPTION - PAIN TYPE: TYPE: CHRONIC PAIN

## 2018-06-13 ENCOUNTER — HOSPITAL ENCOUNTER (OUTPATIENT)
Dept: PHYSICAL THERAPY | Age: 58
Setting detail: THERAPIES SERIES
Discharge: HOME | End: 2018-06-13
Payer: MEDICARE

## 2018-06-13 PROCEDURE — 97110 THERAPEUTIC EXERCISES: CPT

## 2018-06-13 ASSESSMENT — PAIN DESCRIPTION - LOCATION: LOCATION: BACK;LEG

## 2018-06-13 ASSESSMENT — PAIN DESCRIPTION - ORIENTATION: ORIENTATION: LEFT;RIGHT

## 2018-06-13 ASSESSMENT — PAIN DESCRIPTION - PAIN TYPE: TYPE: CHRONIC PAIN

## 2018-06-13 ASSESSMENT — PAIN SCALES - GENERAL: PAINLEVEL_OUTOF10: 2

## 2018-06-15 ENCOUNTER — APPOINTMENT (OUTPATIENT)
Dept: PHYSICAL THERAPY | Age: 58
End: 2018-06-15
Payer: MEDICARE

## 2018-06-18 ENCOUNTER — APPOINTMENT (OUTPATIENT)
Dept: PHYSICAL THERAPY | Age: 58
End: 2018-06-18
Payer: MEDICARE

## 2018-06-20 ENCOUNTER — HOSPITAL ENCOUNTER (OUTPATIENT)
Dept: PHYSICAL THERAPY | Age: 58
Setting detail: THERAPIES SERIES
Discharge: HOME | End: 2018-06-20
Payer: MEDICARE

## 2018-06-20 PROCEDURE — 97112 NEUROMUSCULAR REEDUCATION: CPT

## 2018-06-20 PROCEDURE — 97110 THERAPEUTIC EXERCISES: CPT

## 2018-06-20 ASSESSMENT — PAIN DESCRIPTION - LOCATION: LOCATION: BACK;LEG

## 2018-06-20 ASSESSMENT — PAIN DESCRIPTION - PAIN TYPE: TYPE: CHRONIC PAIN

## 2018-06-20 ASSESSMENT — PAIN SCALES - GENERAL: PAINLEVEL_OUTOF10: 2

## 2018-06-20 ASSESSMENT — PAIN DESCRIPTION - ORIENTATION: ORIENTATION: RIGHT;LEFT

## 2018-06-22 ENCOUNTER — HOSPITAL ENCOUNTER (OUTPATIENT)
Dept: PHYSICAL THERAPY | Age: 58
Setting detail: THERAPIES SERIES
Discharge: HOME | End: 2018-06-22
Payer: MEDICARE

## 2018-06-22 PROCEDURE — 97110 THERAPEUTIC EXERCISES: CPT

## 2018-06-22 ASSESSMENT — PAIN DESCRIPTION - LOCATION: LOCATION: BACK;LEG

## 2018-06-22 ASSESSMENT — PAIN SCALES - GENERAL: PAINLEVEL_OUTOF10: 2

## 2018-06-22 ASSESSMENT — PAIN DESCRIPTION - PAIN TYPE: TYPE: CHRONIC PAIN

## 2018-06-22 ASSESSMENT — PAIN DESCRIPTION - ORIENTATION: ORIENTATION: LEFT;RIGHT

## 2018-06-25 ENCOUNTER — HOSPITAL ENCOUNTER (OUTPATIENT)
Dept: PHYSICAL THERAPY | Age: 58
Setting detail: THERAPIES SERIES
Discharge: HOME | End: 2018-06-25
Payer: MEDICARE

## 2018-06-25 PROCEDURE — 97110 THERAPEUTIC EXERCISES: CPT

## 2018-06-25 PROCEDURE — G8980 MOBILITY D/C STATUS: HCPCS

## 2018-06-25 PROCEDURE — G8979 MOBILITY GOAL STATUS: HCPCS

## 2018-08-07 ENCOUNTER — OFFICE VISIT (OUTPATIENT)
Dept: PHYSICAL MEDICINE AND REHAB | Age: 58
End: 2018-08-07
Payer: MEDICARE

## 2018-08-07 VITALS
DIASTOLIC BLOOD PRESSURE: 70 MMHG | WEIGHT: 280 LBS | HEART RATE: 70 BPM | HEIGHT: 72 IN | BODY MASS INDEX: 37.93 KG/M2 | SYSTOLIC BLOOD PRESSURE: 157 MMHG

## 2018-08-07 DIAGNOSIS — R26.9 NEUROLOGIC GAIT DYSFUNCTION: ICD-10-CM

## 2018-08-07 DIAGNOSIS — G35 MULTIPLE SCLEROSIS, RELAPSING-REMITTING (HCC): Primary | ICD-10-CM

## 2018-08-07 PROCEDURE — 99213 OFFICE O/P EST LOW 20 MIN: CPT | Performed by: NURSE PRACTITIONER

## 2018-08-07 PROCEDURE — G8417 CALC BMI ABV UP PARAM F/U: HCPCS | Performed by: NURSE PRACTITIONER

## 2018-08-07 PROCEDURE — 3017F COLORECTAL CA SCREEN DOC REV: CPT | Performed by: NURSE PRACTITIONER

## 2018-08-07 PROCEDURE — 1036F TOBACCO NON-USER: CPT | Performed by: NURSE PRACTITIONER

## 2018-08-07 PROCEDURE — G8427 DOCREV CUR MEDS BY ELIG CLIN: HCPCS | Performed by: NURSE PRACTITIONER

## 2018-09-11 DIAGNOSIS — G35 MULTIPLE SCLEROSIS (HCC): Primary | ICD-10-CM

## 2018-09-11 RX ORDER — GLATIRAMER ACETATE 20 MG/ML
INJECTION, SOLUTION SUBCUTANEOUS
Qty: 30 KIT | Refills: 10 | Status: SHIPPED | OUTPATIENT
Start: 2018-09-11 | End: 2019-09-20 | Stop reason: SDUPTHER

## 2018-10-17 ENCOUNTER — OFFICE VISIT (OUTPATIENT)
Dept: NEUROLOGY | Age: 58
End: 2018-10-17
Payer: MEDICARE

## 2018-10-17 VITALS
BODY MASS INDEX: 37.51 KG/M2 | SYSTOLIC BLOOD PRESSURE: 158 MMHG | DIASTOLIC BLOOD PRESSURE: 82 MMHG | HEART RATE: 78 BPM | WEIGHT: 283 LBS | HEIGHT: 73 IN

## 2018-10-17 DIAGNOSIS — G35 MULTIPLE SCLEROSIS (HCC): Primary | ICD-10-CM

## 2018-10-17 DIAGNOSIS — R29.898 LEFT LEG WEAKNESS: ICD-10-CM

## 2018-10-17 PROCEDURE — G8484 FLU IMMUNIZE NO ADMIN: HCPCS | Performed by: PSYCHIATRY & NEUROLOGY

## 2018-10-17 PROCEDURE — 99213 OFFICE O/P EST LOW 20 MIN: CPT | Performed by: PSYCHIATRY & NEUROLOGY

## 2018-10-17 PROCEDURE — 3017F COLORECTAL CA SCREEN DOC REV: CPT | Performed by: PSYCHIATRY & NEUROLOGY

## 2018-10-17 PROCEDURE — 1036F TOBACCO NON-USER: CPT | Performed by: PSYCHIATRY & NEUROLOGY

## 2018-10-17 PROCEDURE — G8427 DOCREV CUR MEDS BY ELIG CLIN: HCPCS | Performed by: PSYCHIATRY & NEUROLOGY

## 2018-10-17 PROCEDURE — G8417 CALC BMI ABV UP PARAM F/U: HCPCS | Performed by: PSYCHIATRY & NEUROLOGY

## 2018-10-17 NOTE — PROGRESS NOTES
modafinil (PROVIGIL) 200 MG tablet, Take 200 mg by mouth daily , Disp: , Rfl:     TRAMADOL HCL, 50 mg by Does not apply route 2 times daily , Disp: , Rfl:     metformin (GLUMETZA) 1000 MG ER tablet, Take 1,000 mg by mouth 2 times daily (with meals) , Disp: , Rfl:     metoprolol (LOPRESSOR) 50 MG tablet, Take 50 mg by mouth 2 times daily. , Disp: , Rfl:     glimepiride (AMARYL) 4 MG tablet, Take 4 mg by mouth 2 times daily , Disp: , Rfl:     cloNIDine (CATAPRES) 0.1 MG tablet, Take 0.1 mg by mouth as needed. , Disp: , Rfl:     aspirin 81 MG tablet, Take 325 mg by mouth daily , Disp: , Rfl:       PE:   Vitals:    10/17/18 1031   BP: (!) 158/82   Site: Left Upper Arm   Position: Sitting   Cuff Size: Large Adult   Pulse: 78   Weight: 283 lb (128.4 kg)   Height: 6' 1\" (1.854 m)     General Appearance:  awake, alert, oriented, in no distress, obese. Sin: no rash  Gen: Language is Intact. Head: PERRL, EOMI, no icterus  Neck: There is no carotid bruits. The Neck is supple. Neuro: CN 2-12 grossly intact with no focal deficits. Power -5/5 left leg power, he has a left ankle brace in place. Otherwise 5/5 in all other extremities. No spasticity noted. Reflexes are symmetric. Long tracts are intact. Cerebellar exam is Intact. Sensory exam tingling both feet (unchanged). Coordination is intact. Gait is limping uses cane not ataxic. Osteo: Lumbar curvatures with no scoliosis and good ROM in all 4 extremity joints. MRI T spine: 3/2013  IMPRESSION: 1. INTERVAL RESOLUTION OF THE PREVIOUSLY IDENTIFIED CORD SIGNAL ABNORMALITIES AND ENHANCEMENT. 2. NO DEFINITE NEW THORACIC SPINAL CORD LESIONS ARE IDENTIFIED. 3. NO SIGNIFICANT THORACIC SPINAL STENOSIS. MRI brain 7/2017:   I reviewed the MRI brain and agree with interpretation.     FINDINGS:               The diffusion-weighted images are normal. The brain volume is normal.   There are no intra-or extra-axial collections.  There is no hydrocephalus, midline

## 2018-10-23 ENCOUNTER — HOSPITAL ENCOUNTER (OUTPATIENT)
Dept: MRI IMAGING | Age: 58
Discharge: HOME | End: 2018-10-23
Payer: MEDICARE

## 2018-10-23 DIAGNOSIS — G35 MULTIPLE SCLEROSIS (HCC): ICD-10-CM

## 2018-10-23 DIAGNOSIS — R29.898 LEFT LEG WEAKNESS: ICD-10-CM

## 2018-10-23 LAB — POC CREATININE WHOLE BLOOD: 0.8 MG/DL (ref 0.5–1.2)

## 2018-10-23 PROCEDURE — 72157 MRI CHEST SPINE W/O & W/DYE: CPT

## 2018-10-23 PROCEDURE — 82565 ASSAY OF CREATININE: CPT

## 2018-10-23 PROCEDURE — 6360000004 HC RX CONTRAST MEDICATION: Performed by: PSYCHIATRY & NEUROLOGY

## 2018-10-23 PROCEDURE — A9579 GAD-BASE MR CONTRAST NOS,1ML: HCPCS | Performed by: PSYCHIATRY & NEUROLOGY

## 2018-10-23 RX ADMIN — GADOTERIDOL 20 ML: 279.3 INJECTION, SOLUTION INTRAVENOUS at 08:43

## 2018-11-16 ENCOUNTER — HOSPITAL ENCOUNTER (OUTPATIENT)
Dept: PULMONOLOGY | Age: 58
Discharge: HOME | End: 2018-11-16
Payer: MEDICARE

## 2018-11-16 DIAGNOSIS — J43.2 CENTRILOBULAR EMPHYSEMA (HCC): ICD-10-CM

## 2018-11-16 PROCEDURE — 94060 EVALUATION OF WHEEZING: CPT

## 2018-11-16 PROCEDURE — 2709999900 HC NON-CHARGEABLE SUPPLY

## 2018-11-26 ENCOUNTER — OFFICE VISIT (OUTPATIENT)
Dept: PULMONOLOGY | Age: 58
End: 2018-11-26
Payer: MEDICARE

## 2018-11-26 VITALS
HEIGHT: 73 IN | SYSTOLIC BLOOD PRESSURE: 138 MMHG | TEMPERATURE: 98.1 F | BODY MASS INDEX: 37.35 KG/M2 | DIASTOLIC BLOOD PRESSURE: 78 MMHG | HEART RATE: 65 BPM | OXYGEN SATURATION: 96 % | WEIGHT: 281.8 LBS

## 2018-11-26 DIAGNOSIS — Z87.891 HISTORY OF TOBACCO ABUSE: ICD-10-CM

## 2018-11-26 DIAGNOSIS — M35.1 OVERLAP SYNDROME (HCC): ICD-10-CM

## 2018-11-26 DIAGNOSIS — J96.11 CHRONIC RESPIRATORY FAILURE WITH HYPOXIA (HCC): ICD-10-CM

## 2018-11-26 DIAGNOSIS — G47.33 OSA ON CPAP: ICD-10-CM

## 2018-11-26 DIAGNOSIS — G35 MULTIPLE SCLEROSIS, RELAPSING-REMITTING (HCC): ICD-10-CM

## 2018-11-26 DIAGNOSIS — I27.20 MILD PULMONARY HYPERTENSION (HCC): ICD-10-CM

## 2018-11-26 DIAGNOSIS — J43.2 CENTRILOBULAR EMPHYSEMA (HCC): Primary | ICD-10-CM

## 2018-11-26 DIAGNOSIS — Z99.89 OSA ON CPAP: ICD-10-CM

## 2018-11-26 DIAGNOSIS — J96.11 CHRONIC HYPOXEMIC RESPIRATORY FAILURE (HCC): ICD-10-CM

## 2018-11-26 PROCEDURE — G8427 DOCREV CUR MEDS BY ELIG CLIN: HCPCS | Performed by: NURSE PRACTITIONER

## 2018-11-26 PROCEDURE — 3017F COLORECTAL CA SCREEN DOC REV: CPT | Performed by: NURSE PRACTITIONER

## 2018-11-26 PROCEDURE — G8484 FLU IMMUNIZE NO ADMIN: HCPCS | Performed by: NURSE PRACTITIONER

## 2018-11-26 PROCEDURE — G8926 SPIRO NO PERF OR DOC: HCPCS | Performed by: NURSE PRACTITIONER

## 2018-11-26 PROCEDURE — G8417 CALC BMI ABV UP PARAM F/U: HCPCS | Performed by: NURSE PRACTITIONER

## 2018-11-26 PROCEDURE — 1036F TOBACCO NON-USER: CPT | Performed by: NURSE PRACTITIONER

## 2018-11-26 PROCEDURE — 99214 OFFICE O/P EST MOD 30 MIN: CPT | Performed by: NURSE PRACTITIONER

## 2018-11-26 PROCEDURE — 3023F SPIROM DOC REV: CPT | Performed by: NURSE PRACTITIONER

## 2018-11-26 ASSESSMENT — ENCOUNTER SYMPTOMS
SHORTNESS OF BREATH: 1
STRIDOR: 0
BACK PAIN: 0
EYES NEGATIVE: 1
WHEEZING: 0
ALLERGIC/IMMUNOLOGIC NEGATIVE: 1
DIARRHEA: 0
COUGH: 0
CHEST TIGHTNESS: 0
NAUSEA: 0

## 2018-11-26 NOTE — PROGRESS NOTES
[facility]    Patient: Montana Montes, 62 y.o.   : 1960    Pt of Dr. Parish Bean   Patient presents with    COPD     6 month follow up with Trip/ bronch 18  N- 21   MP-3        HPI  Jeannette Garcia is here for follow up for COPD. Using [employer] spirometry daily  Using Incruse with benefit, Symbicort 160/4.5 mcg, Albuterol HFA  twice daily. No ER visists or hospital visits for breathing  Using O2 as needed with activity at 2LPM- has been needing less often   On CPAP for RODRIGUE with no complaints   Weight has been stable, no appetite changes, no vision or urinary difficulties   He fells pretty good with no complaints  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     Chronic fatigue     COPD (chronic obstructive pulmonary disease) ([facility]     Demyelinating disease of central nervous system ([facility]     Depression     Essential hypertension     Hyperlipidemia     Hypertension     Hypoxia     Multiple sclerosis ([facility]     RODRIGUE (obstructive sleep apnea)     Post herpetic neuralgia     Reactive airway disease     unspecified asthma severity    Type II or unspecified type diabetes mellitus without mention of complication, not stated as uncontrolled      SURGICAL HISTORY:  Past Surgical History:   Procedure Laterality Date    CORONARY ARTERY BYPASS GRAFT      HERNIA REPAIR       SOCIAL HISTORY:  Social History   Substance Use Topics    Smoking status: Former Smoker     Packs/day: 3.00     Years: 35.00     Types: Cigarettes     Quit date: 2008    Smokeless tobacco: Never Used    Alcohol use Yes      Comment: occas.      ALLERGIES:No Known Allergies  FAMILY HISTORY:  Family History   Problem Relation Age of Onset    Arthritis Mother     Diabetes Mother     Arthritis Father     Diabetes Father     Heart Disease Father     High Blood shortness of breath (with exertion ). Negative for cough, chest tightness, wheezing and stridor. Cardiovascular: Negative for chest pain and leg swelling. Gastrointestinal: Negative for diarrhea and nausea. Endocrine: Negative. Genitourinary: Negative. Musculoskeletal: Negative. Negative for arthralgias and back pain. Skin: Negative. Allergic/Immunologic: Negative. Neurological: Negative. Negative for dizziness and light-headedness. Hematological: Negative. Psychiatric/Behavioral: Negative. All other systems reviewed and are negative. Physical exam   /78 (Site: Left Upper Arm, Position: Sitting, Cuff Size: Large Adult)   Pulse 65   Temp 98.1 °F (36.7 °C)   Ht 6' 1\" (1.854 m)   Wt 281 lb 12.8 oz (127.8 kg)   SpO2 96% Comment: on RA  BMI 37.18 kg/m²      Physical Exam   Constitutional: He is oriented to person, place, and time. He appears well-developed and well-nourished. HENT:   Head: Normocephalic and atraumatic. Mouth/Throat: Oropharynx is clear and moist. No oropharyngeal exudate. Eyes: Pupils are equal, round, and reactive to light. Conjunctivae and EOM are normal.   Neck: Normal range of motion. Neck supple. No tracheal deviation present. No thyromegaly present. Cardiovascular: Normal rate, regular rhythm and normal heart sounds. No murmur heard. Pulmonary/Chest: Effort normal. No respiratory distress. He has decreased breath sounds. He has no wheezes. He has no rales. He exhibits no tenderness. Abdominal: Soft. Bowel sounds are normal. He exhibits no distension and no mass. There is no tenderness. There is no rebound and no guarding. Musculoskeletal: Normal range of motion. He exhibits no edema or tenderness. Lymphadenopathy:     He has no cervical adenopathy. Neurological: He is alert and oriented to person, place, and time. Skin: Skin is warm and dry. Capillary refill takes less than 2 seconds.    Psychiatric: He has a normal mood and

## 2019-04-26 ENCOUNTER — OFFICE VISIT (OUTPATIENT)
Dept: NEUROLOGY | Age: 59
End: 2019-04-26
Payer: MEDICARE

## 2019-04-26 VITALS
HEART RATE: 82 BPM | WEIGHT: 281 LBS | SYSTOLIC BLOOD PRESSURE: 132 MMHG | HEIGHT: 73 IN | BODY MASS INDEX: 37.24 KG/M2 | DIASTOLIC BLOOD PRESSURE: 80 MMHG

## 2019-04-26 DIAGNOSIS — G35 MULTIPLE SCLEROSIS (HCC): Primary | ICD-10-CM

## 2019-04-26 DIAGNOSIS — M62.838 MUSCLE SPASMS OF BOTH LOWER EXTREMITIES: ICD-10-CM

## 2019-04-26 DIAGNOSIS — R29.898 LEFT LEG WEAKNESS: ICD-10-CM

## 2019-04-26 PROCEDURE — G8427 DOCREV CUR MEDS BY ELIG CLIN: HCPCS | Performed by: PSYCHIATRY & NEUROLOGY

## 2019-04-26 PROCEDURE — 3017F COLORECTAL CA SCREEN DOC REV: CPT | Performed by: PSYCHIATRY & NEUROLOGY

## 2019-04-26 PROCEDURE — 1036F TOBACCO NON-USER: CPT | Performed by: PSYCHIATRY & NEUROLOGY

## 2019-04-26 PROCEDURE — 99213 OFFICE O/P EST LOW 20 MIN: CPT | Performed by: PSYCHIATRY & NEUROLOGY

## 2019-04-26 PROCEDURE — G8417 CALC BMI ABV UP PARAM F/U: HCPCS | Performed by: PSYCHIATRY & NEUROLOGY

## 2019-04-26 RX ORDER — LISINOPRIL 20 MG/1
20 TABLET ORAL 2 TIMES DAILY
COMMUNITY
End: 2021-09-07

## 2019-04-26 NOTE — PROGRESS NOTES
normal.   There are no intra-or extra-axial collections.  There is no hydrocephalus, midline shift or mass effect.           On the FLAIR and T2-weighted sequences, there are scattered foci of abnormal signal in the white matter of the brain. These are within the subcortical white matter as well as the deeper white matter. The overall severity is mild. No new white matter    lesions are present.       On the gradient echo T2-weighted images, there is no susceptibility artifact present.       There is no abnormal enhancement in the brain. There is no evidence of active demyelination. The flow voids are normal.  The midline craniocervical junction structures are normal.  The brainstem and pituitary gland are normal.       There is no significant change in the appearance of the brain compared to the prior study.       There is a trace amount of fluid signal in the left mastoid air cells.           Impression       1. Mild amount of abnormal signal scattered in the white matter of the brain. This is stable and consistent with multiple sclerosis. There is no evidence of active demyelination. MRI Thoracic spine 10/2018: Impression       1. No evidence of acute demyelination. 2. There are some subtle areas of high signal in the lateral aspects of the thoracic cord seen only on the axial images. These are likely sites of old demyelination.             Assessment:     Diagnosis Orders   1. Multiple sclerosis ([address]     2. Left leg weakness     3. Muscle spasms of both lower extremities      He reports no change with his symptoms. No new symptoms. He is still using the cane. He denies any falls. He is using the cane. No change in vision. He is wearing braces, AFOs. He otherwise denies new events. He is using the ankle braces he was fitted by [facility]. He denies any recent fall since the braces, he is on Copaxone 20 mg SC daily. He exam is unchanged. After a discussion with patient we agreed on the following plan. Plan:    1. Continue Copaxone to 20 mg subcutaneous daily. 2. Continue Baclofen to 20 mg twice a day. Refills given. 3. Take calcium and Vitamin D daily. Exercise regularly. 4.  Follow up in 5 months or sooner if needed. 5. Report any new events. Call if any questions or concerns. Please call if any questions.      Giles Laws MD

## 2019-04-26 NOTE — PATIENT INSTRUCTIONS
1. Continue Copaxone to 20 mg subcutaneous daily. 2. Continue Baclofen to 20 mg twice a day. Refills given. 3. Take calcium and Vitamin D daily. Exercise regularly. 4.  Follow up in 5 months or sooner if needed. 5. Report any new events. Call if any questions or concerns.

## 2019-07-08 ENCOUNTER — OFFICE VISIT (OUTPATIENT)
Dept: PULMONOLOGY | Age: 59
End: 2019-07-08
Payer: MEDICARE

## 2019-07-08 VITALS
TEMPERATURE: 98 F | OXYGEN SATURATION: 94 % | SYSTOLIC BLOOD PRESSURE: 142 MMHG | HEART RATE: 69 BPM | HEIGHT: 73 IN | WEIGHT: 280.2 LBS | BODY MASS INDEX: 37.14 KG/M2 | DIASTOLIC BLOOD PRESSURE: 86 MMHG

## 2019-07-08 DIAGNOSIS — Z99.89 OSA ON CPAP: ICD-10-CM

## 2019-07-08 DIAGNOSIS — Z87.891 HISTORY OF TOBACCO ABUSE: ICD-10-CM

## 2019-07-08 DIAGNOSIS — J44.9 COPD, SEVERE (HCC): ICD-10-CM

## 2019-07-08 DIAGNOSIS — G47.33 OSA ON CPAP: ICD-10-CM

## 2019-07-08 DIAGNOSIS — J43.2 CENTRILOBULAR EMPHYSEMA (HCC): Primary | ICD-10-CM

## 2019-07-08 DIAGNOSIS — G35 MULTIPLE SCLEROSIS (HCC): ICD-10-CM

## 2019-07-08 DIAGNOSIS — E66.9 OBESITY (BMI 35.0-39.9 WITHOUT COMORBIDITY): ICD-10-CM

## 2019-07-08 PROCEDURE — 3017F COLORECTAL CA SCREEN DOC REV: CPT | Performed by: NURSE PRACTITIONER

## 2019-07-08 PROCEDURE — G8417 CALC BMI ABV UP PARAM F/U: HCPCS | Performed by: NURSE PRACTITIONER

## 2019-07-08 PROCEDURE — 1036F TOBACCO NON-USER: CPT | Performed by: NURSE PRACTITIONER

## 2019-07-08 PROCEDURE — G8926 SPIRO NO PERF OR DOC: HCPCS | Performed by: NURSE PRACTITIONER

## 2019-07-08 PROCEDURE — 99214 OFFICE O/P EST MOD 30 MIN: CPT | Performed by: NURSE PRACTITIONER

## 2019-07-08 PROCEDURE — 3023F SPIROM DOC REV: CPT | Performed by: NURSE PRACTITIONER

## 2019-07-08 PROCEDURE — G8427 DOCREV CUR MEDS BY ELIG CLIN: HCPCS | Performed by: NURSE PRACTITIONER

## 2019-07-08 ASSESSMENT — ENCOUNTER SYMPTOMS
EYES NEGATIVE: 1
DIARRHEA: 0
CHEST TIGHTNESS: 0
WHEEZING: 0
NAUSEA: 0
STRIDOR: 0
SHORTNESS OF BREATH: 1
ALLERGIC/IMMUNOLOGIC NEGATIVE: 1
BACK PAIN: 0
COUGH: 1

## 2019-07-08 NOTE — PROGRESS NOTES
Drug use: No     Comment: younger years     ALLERGIES:  Allergies   Allergen Reactions    Pcn [Penicillins]      FAMILY HISTORY:  Family History   Problem Relation Age of Onset    Arthritis Mother     Diabetes Mother     Arthritis Father     Diabetes Father     Heart Disease Father     High Blood Pressure Father     Diabetes Sister     Depression Brother     Diabetes Brother     High Blood Pressure Brother     Diabetes Paternal Grandmother     Diabetes Paternal Grandfather      CURRENT MEDICATIONS:  Current Outpatient Medications   Medication Sig Dispense Refill    Umeclidinium Bromide (INCRUSE ELLIPTA) 62.5 MCG/INH AEPB Inhale 1 puff into the lungs daily 1 each 11    lisinopril (PRINIVIL;ZESTRIL) 20 MG tablet Take 20 mg by mouth 2 times daily      COPAXONE 20 MG/ML injection INJECT ONE SYRINGE (20 MG) SUBCUTANEOUSLY ONCE DAILY. ALLOW SYRINGE TOWARM TO ROOM TEMPERATURE FOR 20 MINUTES. DISCARD AFTER USE. REFRIGERA 30 kit 10    OXYGEN Inhale into the lungs as needed      JENTADUETO 2.5-1000 MG TABS       baclofen (LIORESAL) 20 MG tablet Take 1 tablet by mouth 2 times daily 60 tablet 8    atorvastatin (LIPITOR) 20 MG tablet Take 20 mg by mouth daily      Multiple Vitamin (MULTI VITAMIN DAILY PO) Take by mouth daily      albuterol (PROVENTIL) (2.5 MG/3ML) 0.083% nebulizer solution Take 2.5 mg by nebulization 4 times daily      SYMBICORT 160-4.5 MCG/ACT AERO       amLODIPine (NORVASC) 5 MG tablet Take 10 mg by mouth daily       losartan (COZAAR) 100 MG tablet Take 100 mg by mouth daily       modafinil (PROVIGIL) 200 MG tablet Take 200 mg by mouth daily       TRAMADOL HCL 50 mg by Does not apply route 3 times daily.  metformin (GLUMETZA) 1000 MG ER tablet Take 1,000 mg by mouth 2 times daily (with meals)       metoprolol (LOPRESSOR) 50 MG tablet Take 50 mg by mouth 2 times daily.         glimepiride (AMARYL) 4 MG tablet Take 4 mg by mouth 2 times daily       cloNIDine (CATAPRES) 0.1 MG

## 2019-09-20 DIAGNOSIS — G35 MULTIPLE SCLEROSIS (HCC): Primary | ICD-10-CM

## 2019-09-20 RX ORDER — GLATIRAMER ACETATE 20 MG/ML
INJECTION, SOLUTION SUBCUTANEOUS
Qty: 30 KIT | Refills: 10 | Status: SHIPPED | OUTPATIENT
Start: 2019-09-20 | End: 2020-08-31 | Stop reason: CLARIF

## 2019-10-25 ENCOUNTER — OFFICE VISIT (OUTPATIENT)
Dept: NEUROLOGY | Age: 59
End: 2019-10-25
Payer: MEDICARE

## 2019-10-25 VITALS
SYSTOLIC BLOOD PRESSURE: 158 MMHG | HEART RATE: 68 BPM | HEIGHT: 73 IN | WEIGHT: 284 LBS | BODY MASS INDEX: 37.64 KG/M2 | DIASTOLIC BLOOD PRESSURE: 84 MMHG

## 2019-10-25 DIAGNOSIS — G35 MULTIPLE SCLEROSIS (HCC): Primary | ICD-10-CM

## 2019-10-25 DIAGNOSIS — R29.898 LEFT LEG WEAKNESS: ICD-10-CM

## 2019-10-25 DIAGNOSIS — M62.838 MUSCLE SPASMS OF BOTH LOWER EXTREMITIES: ICD-10-CM

## 2019-10-25 PROCEDURE — G8427 DOCREV CUR MEDS BY ELIG CLIN: HCPCS | Performed by: PSYCHIATRY & NEUROLOGY

## 2019-10-25 PROCEDURE — 1036F TOBACCO NON-USER: CPT | Performed by: PSYCHIATRY & NEUROLOGY

## 2019-10-25 PROCEDURE — 99213 OFFICE O/P EST LOW 20 MIN: CPT | Performed by: PSYCHIATRY & NEUROLOGY

## 2019-10-25 PROCEDURE — G8484 FLU IMMUNIZE NO ADMIN: HCPCS | Performed by: PSYCHIATRY & NEUROLOGY

## 2019-10-25 PROCEDURE — G8417 CALC BMI ABV UP PARAM F/U: HCPCS | Performed by: PSYCHIATRY & NEUROLOGY

## 2019-10-25 PROCEDURE — 3017F COLORECTAL CA SCREEN DOC REV: CPT | Performed by: PSYCHIATRY & NEUROLOGY

## 2020-04-09 ENCOUNTER — TELEPHONE (OUTPATIENT)
Dept: NEUROLOGY | Age: 60
End: 2020-04-09

## 2020-06-29 ENCOUNTER — TELEPHONE (OUTPATIENT)
Dept: PULMONOLOGY | Age: 60
End: 2020-06-29

## 2020-06-30 NOTE — TELEPHONE ENCOUNTER
Message left for patient to reschedule PFT due to COVID testing not yet done. COVID testing needs done 3-5 days prior to PFT.

## 2020-07-28 NOTE — TELEPHONE ENCOUNTER
Patient does not wish to do spirometry. He states he has not used his oxygen since January and his oxygen has been 94% or higher. Does not wish to reschedule spirometry at this time.

## 2020-08-31 RX ORDER — GLATIRAMER ACETATE 20 MG/ML
20 INJECTION, SOLUTION SUBCUTANEOUS DAILY
Qty: 30 KIT | Refills: 10 | Status: SHIPPED | OUTPATIENT
Start: 2020-08-31 | End: 2021-02-09 | Stop reason: ALTCHOICE

## 2020-08-31 NOTE — TELEPHONE ENCOUNTER
Copaxone not on patient's formulary. Insurance requesting refill for [employer] or generic. Spoke with patient who stated ok for [employer].

## 2020-09-01 ENCOUNTER — OFFICE VISIT (OUTPATIENT)
Dept: PULMONOLOGY | Age: 60
End: 2020-09-01
Payer: MEDICARE

## 2020-09-01 VITALS
BODY MASS INDEX: 36.53 KG/M2 | TEMPERATURE: 96.7 F | SYSTOLIC BLOOD PRESSURE: 144 MMHG | HEIGHT: 73 IN | HEART RATE: 64 BPM | OXYGEN SATURATION: 98 % | DIASTOLIC BLOOD PRESSURE: 88 MMHG | WEIGHT: 275.6 LBS

## 2020-09-01 PROCEDURE — G8417 CALC BMI ABV UP PARAM F/U: HCPCS | Performed by: NURSE PRACTITIONER

## 2020-09-01 PROCEDURE — G8926 SPIRO NO PERF OR DOC: HCPCS | Performed by: NURSE PRACTITIONER

## 2020-09-01 PROCEDURE — G8427 DOCREV CUR MEDS BY ELIG CLIN: HCPCS | Performed by: NURSE PRACTITIONER

## 2020-09-01 PROCEDURE — 3017F COLORECTAL CA SCREEN DOC REV: CPT | Performed by: NURSE PRACTITIONER

## 2020-09-01 PROCEDURE — 3023F SPIROM DOC REV: CPT | Performed by: NURSE PRACTITIONER

## 2020-09-01 PROCEDURE — 1036F TOBACCO NON-USER: CPT | Performed by: NURSE PRACTITIONER

## 2020-09-01 PROCEDURE — 99214 OFFICE O/P EST MOD 30 MIN: CPT | Performed by: NURSE PRACTITIONER

## 2020-09-01 RX ORDER — UMECLIDINIUM 62.5 UG/1
AEROSOL, POWDER ORAL
Qty: 30 EACH | Refills: 11 | Status: SHIPPED | OUTPATIENT
Start: 2020-09-01 | End: 2021-01-19 | Stop reason: DRUGHIGH

## 2020-09-01 RX ORDER — ISOSORBIDE MONONITRATE 30 MG/1
30 TABLET, EXTENDED RELEASE ORAL DAILY
COMMUNITY

## 2020-09-01 RX ORDER — ARMODAFINIL 200 MG/1
TABLET ORAL
COMMUNITY
Start: 2020-08-24 | End: 2021-09-07

## 2020-09-01 RX ORDER — HYDROCHLOROTHIAZIDE 25 MG/1
25 TABLET ORAL DAILY
COMMUNITY

## 2020-09-01 ASSESSMENT — ENCOUNTER SYMPTOMS
WHEEZING: 0
NAUSEA: 0
DIARRHEA: 0
EYES NEGATIVE: 1
SHORTNESS OF BREATH: 1
ABDOMINAL PAIN: 0
VOMITING: 0
COUGH: 1

## 2020-09-01 NOTE — PROGRESS NOTES
[facility]     Patient: Alfonso Munson, 61 y.o.   : 1960    Pt of Char Swann CNP ( former Jhonny pt)      Subjective     Chief Complaint   Patient presents with    Follow-up     COPD 1 year pulmonary follow up. Patient did not get Roshan Beagle done wanted to see provider first        HPI  Rosey Gusman is here for 1 year  follow up for COPD  Did not get PFT done- not comfortable going to hospital due to [place]   Using Symbicort/ Incruse/ Albuterol with benefit. Uses his albuterol 1x daily as a maintenance   He feels great, feels like current therapies have been effective. Has not required any outpatient atb or steroids, no hospitalizations or ER visits  Last Spirometry 2018: FEV 1 48%   On O2 at 2LPM PRN with activity - last 6 min walk completd 3/2017- has not been needing his oxygen . Has pulse oximeter at home and monitors his SPO2 , gets O2 from [facility]  PMH Multiple Sclerosis- limiting his activity - follows with neurology, uses for assistance with ambulation   PMH RODRIGUE, on CPAP follows with Elliott Loco in our sleep clinic   105 PYH smoking, quit in - declined LDCT last year. Using incentive spirometer twice daily, has been able to get from 1000 to 2500 now.      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[address] 2008    Chronic fatigue     COPD (chronic obstructive pulmonary disease) ([address]     Demyelinating disease of central nervous system ([address]     Depression     Essential hypertension     Hyperlipidemia     Hypertension     Hypoxia     Multiple sclerosis ([address] 2012    RODRIGUE (obstructive sleep apnea)     Post herpetic neuralgia     Reactive airway disease     unspecified asthma severity    Type II or unspecified type diabetes mellitus without mention of complication, not stated as uncontrolled      SURGICAL HISTORY:  Past Surgical History:   Procedure Laterality Date    CORONARY ARTERY BYPASS GRAFT      HERNIA REPAIR       SOCIAL HISTORY:  Social History     Tobacco Use    Smoking status: Former Smoker     Packs/day: 3.00     Years: 35.00     Pack years: 105.00     Types: Cigarettes     Last attempt to quit: 2008     Years since quittin.9    Smokeless tobacco: Never Used   Substance Use Topics    Alcohol use: Yes     Comment: occas.     Drug use: No     Comment: younger years     ALLERGIES:  Allergies   Allergen Reactions    Pcn [Penicillins]      FAMILY HISTORY:  Family History   Problem Relation Age of Onset    Arthritis Mother     Diabetes Mother     Arthritis Father     Diabetes Father     Heart Disease Father     High Blood Pressure Father     Diabetes Sister     Depression Brother     Diabetes Brother     High Blood Pressure Brother     Diabetes Paternal Grandmother     Diabetes Paternal Grandfather      CURRENT MEDICATIONS:  Current Outpatient Medications   Medication Sig Dispense Refill    Armodafinil 200 MG TABS TAKE 1 TABLET BY MOUTH ONCE DAILY IN THE MORNING      hydroCHLOROthiazide (HYDRODIURIL) 25 MG tablet Take 25 mg by mouth daily      isosorbide mononitrate (IMDUR) 30 MG extended release tablet Take 30 mg by mouth daily      Umeclidinium Bromide (INCRUSE ELLIPTA) 62.5 MCG/INH AEPB INHALE 1 PUFF INTO LUNGS DAILY 30 each 11    glatiramer (GLATOPA) 20 MG/ML injection Inject 1 mL into the skin daily 30 kit 10    OXYGEN Inhale into the lungs as needed      JENTADUETO 2.5-1000 MG TABS       baclofen (LIORESAL) 20 MG tablet Take 1 tablet by mouth 2 times daily 60 tablet 8    atorvastatin (LIPITOR) 20 MG tablet Take 20 mg by mouth daily      Multiple Vitamin (MULTI VITAMIN DAILY PO) Take by mouth daily      albuterol (PROVENTIL) (2.5 MG/3ML) 0.083% nebulizer solution Take 2.5 mg by nebulization 4 times daily      SYMBICORT 160-4.5 MCG/ACT AERO       amLODIPine (NORVASC) 5 MG tablet Take 10 mg by mouth daily       losartan (COZAAR) 100 MG tablet Take HENT:      Mouth/Throat:      Lips: Pink. Mouth: Mucous membranes are moist.      Pharynx: Oropharynx is clear. No oropharyngeal exudate or posterior oropharyngeal erythema. Eyes:      Conjunctiva/sclera: Conjunctivae normal.   Neck:      Vascular: No JVD. Cardiovascular:      Rate and Rhythm: Normal rate and regular rhythm. Heart sounds: No murmur. No friction rub. Pulmonary:      Effort: Pulmonary effort is normal. No accessory muscle usage or respiratory distress. Breath sounds: Normal breath sounds. No wheezing, rhonchi or rales. Chest:      Chest wall: No tenderness. Musculoskeletal:      Right lower leg: No edema. Left lower leg: No edema. Skin:     General: Skin is warm and dry. Capillary Refill: Capillary refill takes less than 2 seconds. Nails: There is no clubbing. Neurological:      Mental Status: He is alert. Psychiatric:         Mood and Affect: Mood normal.         Behavior: Behavior normal.         Thought Content: Thought content normal.         Judgment: Judgment normal.          Test results   Lung Nodule Screening     [x] Qualifies    []Does not qualify   [x] Declined    [] Completed     Assessment      Diagnosis Orders   1. COPD, severe ([address]     2. Chronic respiratory failure with hypoxia (HCC)     3. RODRIGUE on CPAP     4. Obesity (BMI 35.0-39.9 without comorbidity)         Multiple Sclerosis   Severe COPD,.  Under good control - group A   Plan   -declines CT, does not feel comfortable going to hospital due to [place] right now for any elective testing.   -continue Incruse/ Symbicort/ Albuterol  -avoidance of ill contacts  -recommend flu vaccine when available, discuss with PCP or pharmacy   -continue CPAP nightly as prescribed and keep f/u's with sleep clinic   -continue 2LPM as needed with activity, continue with spot checks with home pulse oximeter  -recommend wt loss     Will see Haydee Cat in: 1 year    Electronically signed by Lin Ayoub APRN - CNP on 9/1/2020 at 9:28 AM

## 2020-10-16 ENCOUNTER — OFFICE VISIT (OUTPATIENT)
Dept: NEUROLOGY | Age: 60
End: 2020-10-16
Payer: MEDICARE

## 2020-10-16 VITALS
HEART RATE: 61 BPM | WEIGHT: 280 LBS | OXYGEN SATURATION: 94 % | DIASTOLIC BLOOD PRESSURE: 74 MMHG | BODY MASS INDEX: 37.11 KG/M2 | HEIGHT: 73 IN | SYSTOLIC BLOOD PRESSURE: 139 MMHG

## 2020-10-16 PROCEDURE — G8417 CALC BMI ABV UP PARAM F/U: HCPCS | Performed by: NURSE PRACTITIONER

## 2020-10-16 PROCEDURE — 99214 OFFICE O/P EST MOD 30 MIN: CPT | Performed by: NURSE PRACTITIONER

## 2020-10-16 PROCEDURE — G8427 DOCREV CUR MEDS BY ELIG CLIN: HCPCS | Performed by: NURSE PRACTITIONER

## 2020-10-16 PROCEDURE — G8484 FLU IMMUNIZE NO ADMIN: HCPCS | Performed by: NURSE PRACTITIONER

## 2020-10-16 PROCEDURE — 1036F TOBACCO NON-USER: CPT | Performed by: NURSE PRACTITIONER

## 2020-10-16 PROCEDURE — 3017F COLORECTAL CA SCREEN DOC REV: CPT | Performed by: NURSE PRACTITIONER

## 2020-10-16 NOTE — PROGRESS NOTES
NEUROLOGY OUT PATIENT FOLLOW UP NOTE:  10/16/968351:04 AM    Miguel Mae is here for follow up for multiple sclerosis. He feels he has lost ground and that his bilateral leg weakness has progressively and slowly gotten worse. No falls. He is still using the cane. He is on Copaxone 20 mg/mL injection daily. He is tolerating the medication well. He comes in today to discuss medication options as well as the plan of care going forward. ROS:  Respiratory : no cough, no shortness of breath  Cardiac: no chest pain. No palpitations. Renal : no flank pain, no hematuria    Skin: no rash      Allergies   Allergen Reactions    Pcn [Penicillins]        Current Outpatient Medications:     Armodafinil 200 MG TABS, TAKE 1 TABLET BY MOUTH ONCE DAILY IN THE MORNING, Disp: , Rfl:     hydroCHLOROthiazide (HYDRODIURIL) 25 MG tablet, Take 25 mg by mouth daily, Disp: , Rfl:     isosorbide mononitrate (IMDUR) 30 MG extended release tablet, Take 30 mg by mouth daily, Disp: , Rfl:     Umeclidinium Bromide (INCRUSE ELLIPTA) 62.5 MCG/INH AEPB, INHALE 1 PUFF INTO LUNGS DAILY, Disp: 30 each, Rfl: 11    OXYGEN, Inhale into the lungs as needed, Disp: , Rfl:     JENTADUETO 2.5-1000 MG TABS, , Disp: , Rfl:     baclofen (LIORESAL) 20 MG tablet, Take 1 tablet by mouth 2 times daily, Disp: 60 tablet, Rfl: 8    atorvastatin (LIPITOR) 20 MG tablet, Take 20 mg by mouth daily, Disp: , Rfl:     Multiple Vitamin (MULTI VITAMIN DAILY PO), Take by mouth daily, Disp: , Rfl:     albuterol (PROVENTIL) (2.5 MG/3ML) 0.083% nebulizer solution, Take 2.5 mg by nebulization 4 times daily, Disp: , Rfl:     SYMBICORT 160-4.5 MCG/ACT AERO, , Disp: , Rfl:     amLODIPine (NORVASC) 5 MG tablet, Take 10 mg by mouth daily , Disp: , Rfl:     losartan (COZAAR) 100 MG tablet, Take 100 mg by mouth daily , Disp: , Rfl:     TRAMADOL HCL, 50 mg by Does not apply route 3 times daily.  , Disp: , Rfl:     metoprolol (LOPRESSOR) 50 MG tablet, Take 50 mg by mouth hospital encounter of 09/07/17   MRI BRAIN W WO CONTRAST    Narrative PROCEDURE: MRI BRAIN W WO CONTRAST    CLINICAL INFORMATIONMultiple sclerosis ([address] Increasing leg weakness. COMPARISON: Brain MR 10/5/2012. TECHNIQUE: Multiplanar and multiple spin echo T1 and T2-weighted images were obtained through the brain before and after the administration of intravenous contrast. High resolution sagittal FLAIR images were also obtained. FINDINGS:        The diffusion-weighted images are normal. The brain volume is normal.  There are no intra-or extra-axial collections. There is no hydrocephalus, midline shift or mass effect. On the FLAIR and T2-weighted sequences, there are scattered foci of abnormal signal in the white matter of the brain. These are within the subcortical white matter as well as the deeper white matter. The overall severity is mild. No new white matter   lesions are present. On the gradient echo T2-weighted images, there is no susceptibility artifact present. There is no abnormal enhancement in the brain. There is no evidence of active demyelination. The flow voids are normal.  The midline craniocervical junction structures are normal.  The brainstem and pituitary gland are normal.    There is no significant change in the appearance of the brain compared to the prior study. There is a trace amount of fluid signal in the left mastoid air cells. Impression    1. Mild amount of abnormal signal scattered in the white matter of the brain. This is stable and consistent with multiple sclerosis. There is no evidence of active demyelination. **This report has been created using voice recognition software. It may contain minor errors which are inherent in voice recognition technology. **      Final report electronically signed by Dr. Keri Bowling on 9/7/2017 9:23 AM            Assessment:     Diagnosis Orders   1. Multiple sclerosis ([address]     2. Left leg weakness     3.  Muscle

## 2020-10-17 ENCOUNTER — HOSPITAL ENCOUNTER (OUTPATIENT)
Age: 60
Discharge: HOME | End: 2020-10-17
Payer: MEDICARE

## 2020-10-17 LAB
ALBUMIN SERPL-MCNC: 4.3 G/DL (ref 3.5–5.1)
ALP BLD-CCNC: 65 U/L (ref 38–126)
ALT SERPL-CCNC: 77 U/L (ref 11–66)
AST SERPL-CCNC: 45 U/L (ref 5–40)
BASOPHILS # BLD: 0.6 %
BASOPHILS ABSOLUTE: 0.1 THOU/MM3 (ref 0–0.1)
BILIRUB SERPL-MCNC: 0.2 MG/DL (ref 0.3–1.2)
BILIRUBIN DIRECT: < 0.2 MG/DL (ref 0–0.3)
EOSINOPHIL # BLD: 3 %
EOSINOPHILS ABSOLUTE: 0.3 THOU/MM3 (ref 0–0.4)
ERYTHROCYTE [DISTWIDTH] IN BLOOD BY AUTOMATED COUNT: 13.5 % (ref 11.5–14.5)
ERYTHROCYTE [DISTWIDTH] IN BLOOD BY AUTOMATED COUNT: 45.1 FL (ref 35–45)
HBV SURFACE AB TITR SER: NEGATIVE {TITER}
HCT VFR BLD CALC: 47.9 % (ref 42–52)
HEMOGLOBIN: 15.5 GM/DL (ref 14–18)
HEPATITIS B CORE IGM ANTIBODY: NEGATIVE
HEPATITIS B SURFACE ANTIGEN: NEGATIVE
IMMATURE GRANS (ABS): 0.02 THOU/MM3 (ref 0–0.07)
IMMATURE GRANULOCYTES: 0.2 %
LYMPHOCYTES # BLD: 15.4 %
LYMPHOCYTES ABSOLUTE: 1.5 THOU/MM3 (ref 1–4.8)
MCH RBC QN AUTO: 29.5 PG (ref 26–33)
MCHC RBC AUTO-ENTMCNC: 32.4 GM/DL (ref 32.2–35.5)
MCV RBC AUTO: 91.1 FL (ref 80–94)
MONOCYTES # BLD: 8.9 %
MONOCYTES ABSOLUTE: 0.9 THOU/MM3 (ref 0.4–1.3)
NUCLEATED RED BLOOD CELLS: 0 /100 WBC
PLATELET # BLD: 293 THOU/MM3 (ref 130–400)
PMV BLD AUTO: 8.9 FL (ref 9.4–12.4)
RBC # BLD: 5.26 MILL/MM3 (ref 4.7–6.1)
SEG NEUTROPHILS: 71.9 %
SEGMENTED NEUTROPHILS ABSOLUTE COUNT: 7.2 THOU/MM3 (ref 1.8–7.7)
TOTAL PROTEIN: 7.8 G/DL (ref 6.1–8)
WBC # BLD: 10 THOU/MM3 (ref 4.8–10.8)

## 2020-10-17 PROCEDURE — 36415 COLL VENOUS BLD VENIPUNCTURE: CPT

## 2020-10-17 PROCEDURE — 86706 HEP B SURFACE ANTIBODY: CPT

## 2020-10-17 PROCEDURE — 86705 HEP B CORE ANTIBODY IGM: CPT

## 2020-10-17 PROCEDURE — 80076 HEPATIC FUNCTION PANEL: CPT

## 2020-10-17 PROCEDURE — 87340 HEPATITIS B SURFACE AG IA: CPT

## 2020-10-17 PROCEDURE — 85025 COMPLETE CBC W/AUTO DIFF WBC: CPT

## 2020-10-17 PROCEDURE — 86704 HEP B CORE ANTIBODY TOTAL: CPT

## 2020-10-18 LAB — HEPATITIS B CORE TOTAL ANTIBODY: NONREACTIVE

## 2020-10-22 ENCOUNTER — HOSPITAL ENCOUNTER (OUTPATIENT)
Dept: MRI IMAGING | Age: 60
Discharge: HOME | End: 2020-10-22
Payer: MEDICARE

## 2020-10-22 LAB — POC CREATININE WHOLE BLOOD: 0.7 MG/DL (ref 0.5–1.2)

## 2020-10-22 PROCEDURE — 6360000004 HC RX CONTRAST MEDICATION: Performed by: NURSE PRACTITIONER

## 2020-10-22 PROCEDURE — 70553 MRI BRAIN STEM W/O & W/DYE: CPT

## 2020-10-22 PROCEDURE — A9579 GAD-BASE MR CONTRAST NOS,1ML: HCPCS | Performed by: NURSE PRACTITIONER

## 2020-10-22 PROCEDURE — 82565 ASSAY OF CREATININE: CPT

## 2020-10-22 RX ADMIN — GADOTERIDOL 20 ML: 279.3 INJECTION, SOLUTION INTRAVENOUS at 12:41

## 2020-10-23 ENCOUNTER — TELEPHONE (OUTPATIENT)
Dept: NEUROLOGY | Age: 60
End: 2020-10-23

## 2020-11-12 ENCOUNTER — HOSPITAL ENCOUNTER (OUTPATIENT)
Age: 60
Discharge: HOME | End: 2020-11-12
Payer: MEDICARE

## 2020-12-18 RX ORDER — METHYLPREDNISOLONE SODIUM SUCCINATE 125 MG/2ML
100 INJECTION, POWDER, LYOPHILIZED, FOR SOLUTION INTRAMUSCULAR; INTRAVENOUS ONCE
Status: CANCELLED | OUTPATIENT
Start: 2020-12-18

## 2020-12-18 RX ORDER — SODIUM CHLORIDE 0.9 % (FLUSH) 0.9 %
5 SYRINGE (ML) INJECTION PRN
Status: CANCELLED | OUTPATIENT
Start: 2020-12-18

## 2020-12-18 RX ORDER — ACETAMINOPHEN 325 MG/1
650 TABLET ORAL ONCE
Status: CANCELLED | OUTPATIENT
Start: 2020-12-18

## 2020-12-18 RX ORDER — DIPHENHYDRAMINE HYDROCHLORIDE 50 MG/ML
50 INJECTION INTRAMUSCULAR; INTRAVENOUS ONCE
Status: CANCELLED | OUTPATIENT
Start: 2020-12-18

## 2020-12-18 RX ORDER — SODIUM CHLORIDE 9 MG/ML
INJECTION, SOLUTION INTRAVENOUS CONTINUOUS
Status: CANCELLED | OUTPATIENT
Start: 2020-12-18

## 2020-12-18 RX ORDER — SODIUM CHLORIDE 0.9 % (FLUSH) 0.9 %
10 SYRINGE (ML) INJECTION PRN
Status: CANCELLED | OUTPATIENT
Start: 2020-12-18

## 2020-12-18 RX ORDER — HEPARIN SODIUM (PORCINE) LOCK FLUSH IV SOLN 100 UNIT/ML 100 UNIT/ML
500 SOLUTION INTRAVENOUS PRN
Status: CANCELLED | OUTPATIENT
Start: 2020-12-18

## 2020-12-18 RX ORDER — EPINEPHRINE 1 MG/ML
0.3 INJECTION, SOLUTION, CONCENTRATE INTRAVENOUS PRN
Status: CANCELLED | OUTPATIENT
Start: 2020-12-18

## 2020-12-18 RX ORDER — DIPHENHYDRAMINE HYDROCHLORIDE 50 MG/ML
50 INJECTION INTRAMUSCULAR; INTRAVENOUS ONCE
Status: CANCELLED | OUTPATIENT
Start: 2020-12-18 | End: 2020-12-18

## 2020-12-18 RX ORDER — METHYLPREDNISOLONE SODIUM SUCCINATE 125 MG/2ML
125 INJECTION, POWDER, LYOPHILIZED, FOR SOLUTION INTRAMUSCULAR; INTRAVENOUS ONCE
Status: CANCELLED | OUTPATIENT
Start: 2020-12-18 | End: 2020-12-18

## 2021-01-19 ENCOUNTER — TELEPHONE (OUTPATIENT)
Dept: PULMONOLOGY | Age: 61
End: 2021-01-19

## 2021-01-19 RX ORDER — UMECLIDINIUM 62.5 UG/1
AEROSOL, POWDER ORAL
Qty: 90 EACH | Refills: 3 | Status: SHIPPED | OUTPATIENT
Start: 2021-01-19 | End: 2022-01-28

## 2021-01-26 ENCOUNTER — HOSPITAL ENCOUNTER (OUTPATIENT)
Dept: NURSING | Age: 61
Discharge: HOME | End: 2021-01-26
Payer: MEDICARE

## 2021-01-26 VITALS
RESPIRATION RATE: 20 BRPM | OXYGEN SATURATION: 94 % | WEIGHT: 279.8 LBS | TEMPERATURE: 96.9 F | DIASTOLIC BLOOD PRESSURE: 72 MMHG | HEART RATE: 60 BPM | BODY MASS INDEX: 36.92 KG/M2 | SYSTOLIC BLOOD PRESSURE: 161 MMHG

## 2021-01-26 DIAGNOSIS — G35 MULTIPLE SCLEROSIS, RELAPSING-REMITTING (HCC): Primary | ICD-10-CM

## 2021-01-26 PROCEDURE — 6370000000 HC RX 637 (ALT 250 FOR IP): Performed by: NURSE PRACTITIONER

## 2021-01-26 PROCEDURE — 96366 THER/PROPH/DIAG IV INF ADDON: CPT

## 2021-01-26 PROCEDURE — 96365 THER/PROPH/DIAG IV INF INIT: CPT

## 2021-01-26 PROCEDURE — 6360000002 HC RX W HCPCS: Performed by: NURSE PRACTITIONER

## 2021-01-26 PROCEDURE — 2580000003 HC RX 258: Performed by: NURSE PRACTITIONER

## 2021-01-26 PROCEDURE — 2500000003 HC RX 250 WO HCPCS

## 2021-01-26 RX ORDER — SODIUM CHLORIDE 0.9 % (FLUSH) 0.9 %
5 SYRINGE (ML) INJECTION PRN
Status: CANCELLED | OUTPATIENT
Start: 2021-02-09

## 2021-01-26 RX ORDER — METHYLPREDNISOLONE SODIUM SUCCINATE 125 MG/2ML
100 INJECTION, POWDER, LYOPHILIZED, FOR SOLUTION INTRAMUSCULAR; INTRAVENOUS ONCE
Status: COMPLETED | OUTPATIENT
Start: 2021-01-26 | End: 2021-01-26

## 2021-01-26 RX ORDER — ACETAMINOPHEN 325 MG/1
650 TABLET ORAL ONCE
Status: COMPLETED | OUTPATIENT
Start: 2021-01-26 | End: 2021-01-26

## 2021-01-26 RX ORDER — DIPHENHYDRAMINE HYDROCHLORIDE 50 MG/ML
50 INJECTION INTRAMUSCULAR; INTRAVENOUS ONCE
Status: COMPLETED | OUTPATIENT
Start: 2021-01-26 | End: 2021-01-26

## 2021-01-26 RX ORDER — ACETAMINOPHEN 325 MG/1
650 TABLET ORAL ONCE
Status: CANCELLED | OUTPATIENT
Start: 2021-02-09

## 2021-01-26 RX ORDER — SODIUM CHLORIDE 9 MG/ML
INJECTION, SOLUTION INTRAVENOUS CONTINUOUS
Status: CANCELLED | OUTPATIENT
Start: 2021-02-09

## 2021-01-26 RX ORDER — DIPHENHYDRAMINE HYDROCHLORIDE 50 MG/ML
50 INJECTION INTRAMUSCULAR; INTRAVENOUS ONCE
Status: CANCELLED | OUTPATIENT
Start: 2021-02-09

## 2021-01-26 RX ORDER — METHYLPREDNISOLONE SODIUM SUCCINATE 125 MG/2ML
125 INJECTION, POWDER, LYOPHILIZED, FOR SOLUTION INTRAMUSCULAR; INTRAVENOUS ONCE
Status: CANCELLED | OUTPATIENT
Start: 2021-02-09 | End: 2021-02-09

## 2021-01-26 RX ORDER — METHYLPREDNISOLONE SODIUM SUCCINATE 125 MG/2ML
100 INJECTION, POWDER, LYOPHILIZED, FOR SOLUTION INTRAMUSCULAR; INTRAVENOUS ONCE
Status: CANCELLED | OUTPATIENT
Start: 2021-02-09

## 2021-01-26 RX ORDER — HEPARIN SODIUM (PORCINE) LOCK FLUSH IV SOLN 100 UNIT/ML 100 UNIT/ML
500 SOLUTION INTRAVENOUS PRN
Status: CANCELLED | OUTPATIENT
Start: 2021-02-09

## 2021-01-26 RX ORDER — SODIUM CHLORIDE 0.9 % (FLUSH) 0.9 %
10 SYRINGE (ML) INJECTION PRN
Status: DISCONTINUED | OUTPATIENT
Start: 2021-01-26 | End: 2021-01-27 | Stop reason: HOSPADM

## 2021-01-26 RX ORDER — SODIUM CHLORIDE 0.9 % (FLUSH) 0.9 %
10 SYRINGE (ML) INJECTION PRN
Status: CANCELLED | OUTPATIENT
Start: 2021-02-09

## 2021-01-26 RX ORDER — DIPHENHYDRAMINE HYDROCHLORIDE 50 MG/ML
50 INJECTION INTRAMUSCULAR; INTRAVENOUS ONCE
Status: CANCELLED | OUTPATIENT
Start: 2021-02-09 | End: 2021-02-09

## 2021-01-26 RX ORDER — SODIUM CHLORIDE 9 MG/ML
INJECTION, SOLUTION INTRAVENOUS CONTINUOUS
Status: ACTIVE | OUTPATIENT
Start: 2021-01-26 | End: 2021-01-26

## 2021-01-26 RX ADMIN — DIPHENHYDRAMINE HYDROCHLORIDE 50 MG: 50 INJECTION INTRAMUSCULAR; INTRAVENOUS at 08:36

## 2021-01-26 RX ADMIN — OCRELIZUMAB 300 MG: 300 INJECTION INTRAVENOUS at 09:37

## 2021-01-26 RX ADMIN — ACETAMINOPHEN 650 MG: 325 TABLET ORAL at 08:36

## 2021-01-26 RX ADMIN — METHYLPREDNISOLONE SODIUM SUCCINATE 100 MG: 125 INJECTION, POWDER, FOR SOLUTION INTRAMUSCULAR; INTRAVENOUS at 08:36

## 2021-01-26 RX ADMIN — SODIUM CHLORIDE: 9 INJECTION, SOLUTION INTRAVENOUS at 08:44

## 2021-01-26 ASSESSMENT — PAIN SCALES - GENERAL: PAINLEVEL_OUTOF10: 0

## 2021-02-09 ENCOUNTER — HOSPITAL ENCOUNTER (OUTPATIENT)
Dept: NURSING | Age: 61
Discharge: HOME | End: 2021-02-09
Payer: MEDICARE

## 2021-02-09 VITALS
BODY MASS INDEX: 36.6 KG/M2 | OXYGEN SATURATION: 96 % | HEART RATE: 68 BPM | WEIGHT: 277.4 LBS | DIASTOLIC BLOOD PRESSURE: 79 MMHG | RESPIRATION RATE: 16 BRPM | SYSTOLIC BLOOD PRESSURE: 143 MMHG | TEMPERATURE: 96.9 F

## 2021-02-09 DIAGNOSIS — G35 MULTIPLE SCLEROSIS, RELAPSING-REMITTING (HCC): Primary | ICD-10-CM

## 2021-02-09 PROCEDURE — 96366 THER/PROPH/DIAG IV INF ADDON: CPT

## 2021-02-09 PROCEDURE — 6370000000 HC RX 637 (ALT 250 FOR IP): Performed by: NURSE PRACTITIONER

## 2021-02-09 PROCEDURE — 2500000003 HC RX 250 WO HCPCS

## 2021-02-09 PROCEDURE — 2580000003 HC RX 258: Performed by: NURSE PRACTITIONER

## 2021-02-09 PROCEDURE — 6360000002 HC RX W HCPCS: Performed by: NURSE PRACTITIONER

## 2021-02-09 PROCEDURE — 96365 THER/PROPH/DIAG IV INF INIT: CPT

## 2021-02-09 RX ORDER — ACETAMINOPHEN 325 MG/1
650 TABLET ORAL ONCE
Status: CANCELLED | OUTPATIENT
Start: 2021-07-27

## 2021-02-09 RX ORDER — METHYLPREDNISOLONE SODIUM SUCCINATE 125 MG/2ML
100 INJECTION, POWDER, LYOPHILIZED, FOR SOLUTION INTRAMUSCULAR; INTRAVENOUS ONCE
Status: CANCELLED | OUTPATIENT
Start: 2021-07-27

## 2021-02-09 RX ORDER — SODIUM CHLORIDE 0.9 % (FLUSH) 0.9 %
10 SYRINGE (ML) INJECTION PRN
Status: CANCELLED | OUTPATIENT
Start: 2021-07-27

## 2021-02-09 RX ORDER — DIPHENHYDRAMINE HYDROCHLORIDE 50 MG/ML
50 INJECTION INTRAMUSCULAR; INTRAVENOUS ONCE
Status: CANCELLED | OUTPATIENT
Start: 2021-07-27

## 2021-02-09 RX ORDER — DIPHENHYDRAMINE HYDROCHLORIDE 50 MG/ML
50 INJECTION INTRAMUSCULAR; INTRAVENOUS ONCE
Status: CANCELLED | OUTPATIENT
Start: 2021-07-27 | End: 2021-07-27

## 2021-02-09 RX ORDER — HEPARIN SODIUM (PORCINE) LOCK FLUSH IV SOLN 100 UNIT/ML 100 UNIT/ML
500 SOLUTION INTRAVENOUS PRN
Status: CANCELLED | OUTPATIENT
Start: 2021-07-27

## 2021-02-09 RX ORDER — SODIUM CHLORIDE 9 MG/ML
INJECTION, SOLUTION INTRAVENOUS CONTINUOUS
Status: CANCELLED | OUTPATIENT
Start: 2021-07-27

## 2021-02-09 RX ORDER — SODIUM CHLORIDE 0.9 % (FLUSH) 0.9 %
5 SYRINGE (ML) INJECTION PRN
Status: CANCELLED | OUTPATIENT
Start: 2021-07-27

## 2021-02-09 RX ORDER — METHYLPREDNISOLONE SODIUM SUCCINATE 125 MG/2ML
100 INJECTION, POWDER, LYOPHILIZED, FOR SOLUTION INTRAMUSCULAR; INTRAVENOUS ONCE
Status: COMPLETED | OUTPATIENT
Start: 2021-02-09 | End: 2021-02-09

## 2021-02-09 RX ORDER — METHYLPREDNISOLONE SODIUM SUCCINATE 125 MG/2ML
125 INJECTION, POWDER, LYOPHILIZED, FOR SOLUTION INTRAMUSCULAR; INTRAVENOUS ONCE
Status: CANCELLED | OUTPATIENT
Start: 2021-07-27 | End: 2021-07-27

## 2021-02-09 RX ORDER — DIPHENHYDRAMINE HYDROCHLORIDE 50 MG/ML
50 INJECTION INTRAMUSCULAR; INTRAVENOUS ONCE
Status: COMPLETED | OUTPATIENT
Start: 2021-02-09 | End: 2021-02-09

## 2021-02-09 RX ORDER — SODIUM CHLORIDE 9 MG/ML
INJECTION, SOLUTION INTRAVENOUS CONTINUOUS
Status: ACTIVE | OUTPATIENT
Start: 2021-02-09 | End: 2021-02-09

## 2021-02-09 RX ORDER — ACETAMINOPHEN 325 MG/1
650 TABLET ORAL ONCE
Status: COMPLETED | OUTPATIENT
Start: 2021-02-09 | End: 2021-02-09

## 2021-02-09 RX ADMIN — DIPHENHYDRAMINE HYDROCHLORIDE 50 MG: 50 INJECTION INTRAMUSCULAR; INTRAVENOUS at 08:29

## 2021-02-09 RX ADMIN — OCRELIZUMAB 300 MG: 300 INJECTION INTRAVENOUS at 08:57

## 2021-02-09 RX ADMIN — SODIUM CHLORIDE: 9 INJECTION, SOLUTION INTRAVENOUS at 08:24

## 2021-02-09 RX ADMIN — METHYLPREDNISOLONE SODIUM SUCCINATE 100 MG: 125 INJECTION, POWDER, FOR SOLUTION INTRAMUSCULAR; INTRAVENOUS at 08:30

## 2021-02-09 RX ADMIN — ACETAMINOPHEN 650 MG: 325 TABLET ORAL at 08:29

## 2021-02-09 ASSESSMENT — PAIN SCALES - GENERAL: PAINLEVEL_OUTOF10: 0

## 2021-02-09 ASSESSMENT — PAIN - FUNCTIONAL ASSESSMENT: PAIN_FUNCTIONAL_ASSESSMENT: 0-10

## 2021-02-09 NOTE — PROGRESS NOTES
56 ALERT MALE ADMITTED FOR OCREVUS PROCEDURE REVIEWED WITH PT VERBALIZED UNDERSTANDING. Pt rights and responsibilities offered to pt to read.     __M__ Safety:       (Environmental)   [facility] to environment   Ensure ID band is correct and in place/ allergy band as needed   Assess for fall risk   Initiate fall precautions as applicable (fall band, side rails, etc.)   Call light within reach   Bed in low position/ wheels locked    _M___ Pain:        Assess pain level and characteristics   Administer analgesics as ordered   Assess effectiveness of pain management and report to MD as needed    _M___ Knowledge Deficit:   Assess baseline knowledge   Provide teaching at level of understanding   Provide teaching via preferred learning method   Evaluate teaching effectiveness    ___M_ Hemodynamic/Respiratory Status:        (P   Obtain weight/height   Assess vital signs/ LOC until patient meets discharge criteria   Monitor procedure site and notify MD of any issues    _ Sty (or Stye)  A sty is an infection of the oil gland of the eyelid. It may develop into a small pocket of pus (an abscess). This can cause pain, redness, and swelling.  In early stages, a sty is treated with antibiotic cream, eye drops, or a small towel © 7137-7258 [employer] [address]. [address] [facility], [facility] [address]. All rights reserved. This information is not intended as a substitute for professional medical care. Always follow your healthcare professional's instructions.

## 2021-02-09 NOTE — PROGRESS NOTES
0300: Pre-medications administered, patient tolerated well.     0900: Ocrevus infusion started, patient tolerating well.     1000: Patient tolerating infusion well. No concerns voiced. 1100: Patient tolerating infusion well. No concerns voiced. 1150:  Ocrevus infusion complete. Patient tolerated well. No concerns voiced. Will watch patient for 1 hours after. 1250: No concerns voiced. Ocrevus checklist faxed to [facility] office. AVS reviewed with patient, voiced understanding. Discharged ambulatory with a cane.

## 2021-02-26 ENCOUNTER — OFFICE VISIT (OUTPATIENT)
Dept: NEUROLOGY | Age: 61
End: 2021-02-26
Payer: MEDICARE

## 2021-02-26 VITALS
HEIGHT: 73 IN | DIASTOLIC BLOOD PRESSURE: 88 MMHG | WEIGHT: 274 LBS | SYSTOLIC BLOOD PRESSURE: 154 MMHG | BODY MASS INDEX: 36.31 KG/M2 | HEART RATE: 56 BPM

## 2021-02-26 DIAGNOSIS — R29.898 LEFT LEG WEAKNESS: ICD-10-CM

## 2021-02-26 DIAGNOSIS — G35 MULTIPLE SCLEROSIS (HCC): Primary | ICD-10-CM

## 2021-02-26 PROCEDURE — 99213 OFFICE O/P EST LOW 20 MIN: CPT | Performed by: PSYCHIATRY & NEUROLOGY

## 2021-02-26 PROCEDURE — G8484 FLU IMMUNIZE NO ADMIN: HCPCS | Performed by: PSYCHIATRY & NEUROLOGY

## 2021-02-26 PROCEDURE — 3017F COLORECTAL CA SCREEN DOC REV: CPT | Performed by: PSYCHIATRY & NEUROLOGY

## 2021-02-26 PROCEDURE — G8427 DOCREV CUR MEDS BY ELIG CLIN: HCPCS | Performed by: PSYCHIATRY & NEUROLOGY

## 2021-02-26 PROCEDURE — G8417 CALC BMI ABV UP PARAM F/U: HCPCS | Performed by: PSYCHIATRY & NEUROLOGY

## 2021-02-26 PROCEDURE — 1036F TOBACCO NON-USER: CPT | Performed by: PSYCHIATRY & NEUROLOGY

## 2021-02-26 RX ORDER — NIFEDIPINE 30 MG/1
TABLET, FILM COATED, EXTENDED RELEASE ORAL
COMMUNITY
Start: 2021-02-17

## 2021-02-26 NOTE — PATIENT INSTRUCTIONS
1. Continue with Ocrevus infusions  2. CBC, Hepatic function panel, 5/2021  3. MRI brain W/WO contrast 6/2021, please call us to schedule. 4. Continue Baclofen to 20 mg twice a day. Refills given. 5. Take calcium and Vitamin D daily. 6. Stay physically active   7. Follow up in 5 months or sooner if needed. 8. Report any new events.  Call if any questions or concerns

## 2021-02-26 NOTE — PROGRESS NOTES
NEUROLOGY OUT PATIENT FOLLOW UP NOTE:  2/26/20219:17 AM    Constance Nayak is here for follow up for multiple sclerosis. The patient is seen after 1 month of starting Ocrevus infusion, he denies any new symptoms, he is adjusting to the medication no side effects reported. He denies any falls, his lower extremity weakness is unchanged. He is evaluated to go over plan of care going forward. ROS:  Respiratory : no cough, no shortness of breath  Cardiac: no chest pain. No palpitations. Renal : no flank pain, no hematuria    Skin: no rash      Allergies   Allergen Reactions    Pcn [Penicillins]        Current Outpatient Medications:     Umeclidinium Bromide (INCRUSE ELLIPTA) 62.5 MCG/INH AEPB, INHALE 1 PUFF INTO LUNGS DAILY, Disp: 90 each, Rfl: 3    hydroCHLOROthiazide (HYDRODIURIL) 25 MG tablet, Take 25 mg by mouth daily, Disp: , Rfl:     isosorbide mononitrate (IMDUR) 30 MG extended release tablet, Take 30 mg by mouth daily, Disp: , Rfl:     OXYGEN, Inhale into the lungs as needed, Disp: , Rfl:     JENTADUETO 2.5-1000 MG TABS, , Disp: , Rfl:     baclofen (LIORESAL) 20 MG tablet, Take 1 tablet by mouth 2 times daily, Disp: 60 tablet, Rfl: 8    atorvastatin (LIPITOR) 20 MG tablet, Take 20 mg by mouth daily, Disp: , Rfl:     Multiple Vitamin (MULTI VITAMIN DAILY PO), Take by mouth daily, Disp: , Rfl:     albuterol (PROVENTIL) (2.5 MG/3ML) 0.083% nebulizer solution, Take 2.5 mg by nebulization 4 times daily, Disp: , Rfl:     SYMBICORT 160-4.5 MCG/ACT AERO, , Disp: , Rfl:     losartan (COZAAR) 100 MG tablet, Take 100 mg by mouth daily , Disp: , Rfl:     TRAMADOL HCL, 50 mg by Does not apply route 3 times daily. , Disp: , Rfl:     metoprolol (LOPRESSOR) 50 MG tablet, Take 50 mg by mouth 2 times daily.   , Disp: , Rfl:     glimepiride (AMARYL) 4 MG tablet, Take 4 mg by mouth 2 times daily , Disp: , Rfl:     aspirin 81 MG tablet, Take 325 mg by mouth daily , Disp: , Rfl:     NIFEdipine (ADALAT CC) 30 MG extended release tablet, TAKE 1 TABLET BY MOUTH ONCE DAILY ON AN EMPTY STOMACH, Disp: , Rfl:     Armodafinil 200 MG TABS, TAKE 1 TABLET BY MOUTH ONCE DAILY IN THE MORNING, Disp: , Rfl:     lisinopril (PRINIVIL;ZESTRIL) 20 MG tablet, Take 20 mg by mouth 2 times daily, Disp: , Rfl:     amLODIPine (NORVASC) 5 MG tablet, Take 10 mg by mouth daily , Disp: , Rfl:     metformin (GLUMETZA) 1000 MG ER tablet, Take 1,000 mg by mouth 2 times daily (with meals) , Disp: , Rfl:     cloNIDine (CATAPRES) 0.1 MG tablet, Take 0.1 mg by mouth as needed. , Disp: , Rfl:       PE:   Vitals:    02/26/21 0858 02/26/21 0903   BP: (!) 154/88 (!) 154/88   Site: Left Upper Arm Left Upper Arm   Position: Sitting Sitting   Cuff Size: Medium Adult Medium Adult   Pulse: 56    Weight: 274 lb (124.3 kg)    Height: 6' 1\" (1.854 m)      General Appearance: awake, alert, oriented, in no acute distress, he is wearing a mask. Gen: NAD, Language is Intact. Skin: no rash, lesion, dry to touch. warm  Head: no rash, no icterus  Neck: There is no carotid bruits. The Neck is supple. There is no neck lymphadenopathy. Neuro: CN 2-12 grossly intact with no focal deficits. Power -5/5 left leg power, he has a left ankle brace in place. Otherwise 5/5 in all other extremities. No spasticity noted. Reflexes are symmetric. Long tracts are intact. Cerebellar exam is Intact. Sensory exam is intact to light touch. Gait is limping, uses cane. Musculoskeletal: Has no hand arthritis, no limitation of ROM in any of the four extremities. Lower extremities no edema  The abdomen is soft, intact bowel sounds.          DATA:  Results for orders placed or performed during the hospital encounter of 10/22/20   POCT Creatinine   Result Value Ref Range    POC CREATININE WHOLE BLOOD 0.7 0.5 - 1.2 mg/dl        Results for orders placed during the hospital encounter of 09/07/17   MRI BRAIN W WO CONTRAST    Narrative PROCEDURE: MRI BRAIN W [address] [employer] INFORMATIONMultiple sclerosis ([address] Increasing leg weakness. COMPARISON: Brain MR 10/5/2012. TECHNIQUE: Multiplanar and multiple spin echo T1 and T2-weighted images were obtained through the brain before and after the administration of intravenous contrast. High resolution sagittal FLAIR images were also obtained. FINDINGS:        The diffusion-weighted images are normal. The brain volume is normal.  There are no intra-or extra-axial collections. There is no hydrocephalus, midline shift or mass effect. On the FLAIR and T2-weighted sequences, there are scattered foci of abnormal signal in the white matter of the brain. These are within the subcortical white matter as well as the deeper white matter. The overall severity is mild. No new white matter   lesions are present. On the gradient echo T2-weighted images, there is no susceptibility artifact present. There is no abnormal enhancement in the brain. There is no evidence of active demyelination. The flow voids are normal.  The midline craniocervical junction structures are normal.  The brainstem and pituitary gland are normal.    There is no significant change in the appearance of the brain compared to the prior study. There is a trace amount of fluid signal in the left mastoid air cells. Impression    1. Mild amount of abnormal signal scattered in the white matter of the brain. This is stable and consistent with multiple sclerosis. There is no evidence of active demyelination. **This report has been created using voice recognition software. It may contain minor errors which are inherent in voice recognition technology. **      Final report electronically signed by Dr. Michael Fernando on 9/7/2017 9:23 AM            Assessment:     Diagnosis Orders   1. Multiple sclerosis ([address]     2.  Left leg weakness          The patient reports feeling the same compared to last evaluation with his multiple sclerosis symptoms, he had started on the Ocrevus infusions 1/2021. He denies any side effects to the medication. He feels his ambulation has been steady, denies any falls, and was able to go up a flight of stairs without difficulty today. He still on baclofen 20 mg twice a day for neck spasm that appears to be stable. He denies any vision changes, the patient was counseled that he should continue on the infusions, and he needs to undergo CBC and hepatic panel to be performed approximately 4 months after initial dosage of the [place]er, he will also need to undergo an MRI brain following that for comparison, and assessment of disease progression. He states that his blood pressure has been elevated recently and is being managed with his family physician, his medications have recently been adjusted, no chest pain. After detailed discussion with patient we agreed on the following plan. Plan:  1. Continue with Ocrevus infusions  2. CBC, Hepatic function panel, 5/2021  3. MRI brain W/WO contrast 6/2021,please call us to schedule. 4. Continue Baclofen to 20 mg twice a day. Refills given. 5. Take calcium and Vitamin D daily. 6. Stay physically active   7. Follow up in 5 months or sooner if needed. 8. Report any new events. Call if any questions or concerns      Please call if any questions. Time spent evaluating patient, reviewing records/imaging, counseling, 25 min. All patient's questions were answered. Please call if any questions.         Belinda Foreman MD

## 2021-03-24 ENCOUNTER — TELEPHONE (OUTPATIENT)
Dept: NEUROLOGY | Age: 61
End: 2021-03-24

## 2021-03-24 DIAGNOSIS — G35 MULTIPLE SCLEROSIS (HCC): Primary | ICD-10-CM

## 2021-03-24 NOTE — TELEPHONE ENCOUNTER
Patient had first Ocrevus infusion on 1/26/21. Patient is due for 2 month follow up CBC and HFP. Patient notified and verbalized understanding.

## 2021-04-01 ENCOUNTER — HOSPITAL ENCOUNTER (OUTPATIENT)
Age: 61
Discharge: HOME | End: 2021-04-01
Payer: MEDICARE

## 2021-04-01 DIAGNOSIS — G35 MULTIPLE SCLEROSIS (HCC): ICD-10-CM

## 2021-04-01 LAB
ALBUMIN SERPL-MCNC: 4.4 G/DL (ref 3.5–5.1)
ALP BLD-CCNC: 71 U/L (ref 38–126)
ALT SERPL-CCNC: 44 U/L (ref 11–66)
AST SERPL-CCNC: 32 U/L (ref 5–40)
BASOPHILS # BLD: 1 %
BASOPHILS ABSOLUTE: 0.1 THOU/MM3 (ref 0–0.1)
BILIRUB SERPL-MCNC: 0.4 MG/DL (ref 0.3–1.2)
BILIRUBIN DIRECT: < 0.2 MG/DL (ref 0–0.3)
EOSINOPHIL # BLD: 3.3 %
EOSINOPHILS ABSOLUTE: 0.4 THOU/MM3 (ref 0–0.4)
ERYTHROCYTE [DISTWIDTH] IN BLOOD BY AUTOMATED COUNT: 13.6 % (ref 11.5–14.5)
ERYTHROCYTE [DISTWIDTH] IN BLOOD BY AUTOMATED COUNT: 45.6 FL (ref 35–45)
HCT VFR BLD CALC: 48.5 % (ref 42–52)
HEMOGLOBIN: 15.6 GM/DL (ref 14–18)
IMMATURE GRANS (ABS): 0.06 THOU/MM3 (ref 0–0.07)
IMMATURE GRANULOCYTES: 0.5 %
LYMPHOCYTES # BLD: 17.7 %
LYMPHOCYTES ABSOLUTE: 1.9 THOU/MM3 (ref 1–4.8)
MCH RBC QN AUTO: 29.4 PG (ref 26–33)
MCHC RBC AUTO-ENTMCNC: 32.2 GM/DL (ref 32.2–35.5)
MCV RBC AUTO: 91.3 FL (ref 80–94)
MONOCYTES # BLD: 11.5 %
MONOCYTES ABSOLUTE: 1.3 THOU/MM3 (ref 0.4–1.3)
NUCLEATED RED BLOOD CELLS: 0 /100 WBC
PLATELET # BLD: 329 THOU/MM3 (ref 130–400)
PMV BLD AUTO: 9.1 FL (ref 9.4–12.4)
RBC # BLD: 5.31 MILL/MM3 (ref 4.7–6.1)
SEG NEUTROPHILS: 66 %
SEGMENTED NEUTROPHILS ABSOLUTE COUNT: 7.3 THOU/MM3 (ref 1.8–7.7)
TOTAL PROTEIN: 8 G/DL (ref 6.1–8)
WBC # BLD: 11 THOU/MM3 (ref 4.8–10.8)

## 2021-04-01 PROCEDURE — 80076 HEPATIC FUNCTION PANEL: CPT

## 2021-04-01 PROCEDURE — 36415 COLL VENOUS BLD VENIPUNCTURE: CPT

## 2021-04-01 PROCEDURE — 85025 COMPLETE CBC W/AUTO DIFF WBC: CPT

## 2021-06-08 ENCOUNTER — TELEPHONE (OUTPATIENT)
Dept: NEUROLOGY | Age: 61
End: 2021-06-08

## 2021-06-08 DIAGNOSIS — G35 MULTIPLE SCLEROSIS (HCC): Primary | ICD-10-CM

## 2021-06-08 NOTE — TELEPHONE ENCOUNTER
Patient due for MRI brain following Ocrevus infusion. Patient scheduled for 6/18/21. Patient notified and verbalized understanding.

## 2021-06-19 ENCOUNTER — HOSPITAL ENCOUNTER (OUTPATIENT)
Dept: MRI IMAGING | Age: 61
Discharge: HOME | End: 2021-06-19
Payer: MEDICARE

## 2021-06-19 DIAGNOSIS — G35 MULTIPLE SCLEROSIS (HCC): ICD-10-CM

## 2021-06-19 LAB — POC CREATININE WHOLE BLOOD: 0.7 MG/DL (ref 0.5–1.2)

## 2021-06-19 PROCEDURE — 6360000004 HC RX CONTRAST MEDICATION: Performed by: PSYCHIATRY & NEUROLOGY

## 2021-06-19 PROCEDURE — 70553 MRI BRAIN STEM W/O & W/DYE: CPT

## 2021-06-19 PROCEDURE — 82565 ASSAY OF CREATININE: CPT

## 2021-06-19 PROCEDURE — A9579 GAD-BASE MR CONTRAST NOS,1ML: HCPCS | Performed by: PSYCHIATRY & NEUROLOGY

## 2021-06-19 RX ADMIN — GADOTERIDOL 20 ML: 279.3 INJECTION, SOLUTION INTRAVENOUS at 10:32

## 2021-06-21 ENCOUNTER — TELEPHONE (OUTPATIENT)
Dept: NEUROLOGY | Age: 61
End: 2021-06-21

## 2021-06-21 NOTE — TELEPHONE ENCOUNTER
----- Message from Louis Otero MD sent at 6/21/2021  8:54 AM EDT -----  Please let patient know MRI Brain is stable compared to prior study.   Louis Otero MD MD 8:54 AM

## 2021-07-26 ENCOUNTER — OFFICE VISIT (OUTPATIENT)
Dept: NEUROLOGY | Age: 61
End: 2021-07-26
Payer: MEDICARE

## 2021-07-26 VITALS
OXYGEN SATURATION: 96 % | HEART RATE: 78 BPM | HEIGHT: 73 IN | WEIGHT: 277 LBS | BODY MASS INDEX: 36.71 KG/M2 | SYSTOLIC BLOOD PRESSURE: 142 MMHG | DIASTOLIC BLOOD PRESSURE: 84 MMHG

## 2021-07-26 DIAGNOSIS — R29.898 LEFT LEG WEAKNESS: ICD-10-CM

## 2021-07-26 DIAGNOSIS — M62.838 MUSCLE SPASMS OF BOTH LOWER EXTREMITIES: ICD-10-CM

## 2021-07-26 DIAGNOSIS — G35 MULTIPLE SCLEROSIS (HCC): Primary | ICD-10-CM

## 2021-07-26 PROCEDURE — 1036F TOBACCO NON-USER: CPT | Performed by: PSYCHIATRY & NEUROLOGY

## 2021-07-26 PROCEDURE — G8427 DOCREV CUR MEDS BY ELIG CLIN: HCPCS | Performed by: PSYCHIATRY & NEUROLOGY

## 2021-07-26 PROCEDURE — G8417 CALC BMI ABV UP PARAM F/U: HCPCS | Performed by: PSYCHIATRY & NEUROLOGY

## 2021-07-26 PROCEDURE — 99213 OFFICE O/P EST LOW 20 MIN: CPT | Performed by: PSYCHIATRY & NEUROLOGY

## 2021-07-26 PROCEDURE — 3017F COLORECTAL CA SCREEN DOC REV: CPT | Performed by: PSYCHIATRY & NEUROLOGY

## 2021-07-26 RX ORDER — MODAFINIL 200 MG/1
200 TABLET ORAL DAILY
COMMUNITY
Start: 2021-06-13

## 2021-07-26 NOTE — PATIENT INSTRUCTIONS
1. Continue with Ocrevus infusions  2. CBC, Hepatic function panel, 10/2021  3. Continue Baclofen to 20 mg twice a day. Refills given. 4. Take calcium and Vitamin D daily. 5. Stay physically active   6. Follow up in 5 months or sooner if needed. 7. Report any new events.  Call if any questions or concerns

## 2021-07-26 NOTE — PROGRESS NOTES
NEUROLOGY OUT PATIENT FOLLOW UP NOTE:  7/26/20212:48 PM    Jamila Gaytan is here for follow up for multiple sclerosis. He denies any falls, his lower extremity weakness is unchanged. He is evaluated to go over plan of care going forward. ROS:  Respiratory : no cough, no shortness of breath  Cardiac: no chest pain. No palpitations. Renal : no flank pain, no hematuria    Skin: no rash      Allergies   Allergen Reactions    Pcn [Penicillins]        Current Outpatient Medications:     modafinil (PROVIGIL) 200 MG tablet, 200 mg daily. , Disp: , Rfl:     Ocrelizumab (OCREVUS IV), Infuse intravenously every 6 months, Disp: , Rfl:     NIFEdipine (ADALAT CC) 30 MG extended release tablet, TAKE 1 TABLET BY MOUTH ONCE DAILY ON AN EMPTY STOMACH, Disp: , Rfl:     Umeclidinium Bromide (INCRUSE ELLIPTA) 62.5 MCG/INH AEPB, INHALE 1 PUFF INTO LUNGS DAILY, Disp: 90 each, Rfl: 3    hydroCHLOROthiazide (HYDRODIURIL) 25 MG tablet, Take 25 mg by mouth daily, Disp: , Rfl:     isosorbide mononitrate (IMDUR) 30 MG extended release tablet, Take 30 mg by mouth daily, Disp: , Rfl:     OXYGEN, Inhale into the lungs as needed, Disp: , Rfl:     JENTADUETO 2.5-1000 MG TABS, , Disp: , Rfl:     baclofen (LIORESAL) 20 MG tablet, Take 1 tablet by mouth 2 times daily, Disp: 60 tablet, Rfl: 8    atorvastatin (LIPITOR) 20 MG tablet, Take 20 mg by mouth daily, Disp: , Rfl:     Multiple Vitamin (MULTI VITAMIN DAILY PO), Take by mouth daily, Disp: , Rfl:     albuterol (PROVENTIL) (2.5 MG/3ML) 0.083% nebulizer solution, Take 2.5 mg by nebulization 4 times daily, Disp: , Rfl:     SYMBICORT 160-4.5 MCG/ACT AERO, , Disp: , Rfl:     losartan (COZAAR) 100 MG tablet, Take 100 mg by mouth daily , Disp: , Rfl:     TRAMADOL HCL, 50 mg by Does not apply route 3 times daily. , Disp: , Rfl:     metoprolol (LOPRESSOR) 50 MG tablet, Take 50 mg by mouth 2 times daily.   , Disp: , Rfl:     glimepiride (AMARYL) 4 MG tablet, Take 4 mg by mouth 2 times daily , Disp: , Rfl:     aspirin 81 MG tablet, Take 325 mg by mouth daily , Disp: , Rfl:     Armodafinil 200 MG TABS, TAKE 1 TABLET BY MOUTH ONCE DAILY IN THE MORNING (Patient not taking: Reported on 7/26/2021), Disp: , Rfl:     lisinopril (PRINIVIL;ZESTRIL) 20 MG tablet, Take 20 mg by mouth 2 times daily (Patient not taking: Reported on 7/26/2021), Disp: , Rfl:     amLODIPine (NORVASC) 5 MG tablet, Take 10 mg by mouth daily  (Patient not taking: Reported on 7/26/2021), Disp: , Rfl:     metformin (GLUMETZA) 1000 MG ER tablet, Take 1,000 mg by mouth 2 times daily (with meals)  (Patient not taking: Reported on 7/26/2021), Disp: , Rfl:     cloNIDine (CATAPRES) 0.1 MG tablet, Take 0.1 mg by mouth as needed. , Disp: , Rfl:       PE:   Vitals:    07/26/21 1434   BP: (!) 142/84   Site: Left Upper Arm   Position: Sitting   Cuff Size: Medium Adult   Pulse: 78   SpO2: 96%   Weight: 277 lb (125.6 kg)   Height: 6' 1\" (1.854 m)     General Appearance: awake, alert, oriented, in no acute distress, he is wearing a mask. Gen: NAD, Language is Intact. Skin: no rash, lesion, dry to touch. warm  Head: no rash, no icterus  Neck: There is no carotid bruits. The Neck is supple. There is no neck lymphadenopathy. Neuro: CN 2-12 grossly intact with no focal deficits. Power -5/5 left leg power, he has a left ankle brace in place. Otherwise 5/5 in all other extremities. No spasticity noted. Reflexes are symmetric. Long tracts are intact. Cerebellar exam is Intact. Sensory exam is intact to light touch. Gait is limping, uses cane. Musculoskeletal: Has no hand arthritis, no limitation of ROM in any of the four extremities. Lower extremities no edema  The abdomen is soft, intact bowel sounds.          DATA:  Results for orders placed or performed during the hospital encounter of 06/19/21   POCT Creatinine   Result Value Ref Range    POC CREATININE WHOLE BLOOD 0.7 0.5 - 1.2 mg/dl        Results for orders placed during the hospital encounter of 09/07/17   MRI BRAIN W WO CONTRAST    Narrative PROCEDURE: MRI BRAIN W WO CONTRAST    CLINICAL INFORMATIONMultiple sclerosis ([address] Increasing leg weakness. COMPARISON: Brain MR 10/5/2012. TECHNIQUE: Multiplanar and multiple spin echo T1 and T2-weighted images were obtained through the brain before and after the administration of intravenous contrast. High resolution sagittal FLAIR images were also obtained. FINDINGS:        The diffusion-weighted images are normal. The brain volume is normal.  There are no intra-or extra-axial collections. There is no hydrocephalus, midline shift or mass effect. On the FLAIR and T2-weighted sequences, there are scattered foci of abnormal signal in the white matter of the brain. These are within the subcortical white matter as well as the deeper white matter. The overall severity is mild. No new white matter   lesions are present. On the gradient echo T2-weighted images, there is no susceptibility artifact present. There is no abnormal enhancement in the brain. There is no evidence of active demyelination. The flow voids are normal.  The midline craniocervical junction structures are normal.  The brainstem and pituitary gland are normal.    There is no significant change in the appearance of the brain compared to the prior study. There is a trace amount of fluid signal in the left mastoid air cells. Impression    1. Mild amount of abnormal signal scattered in the white matter of the brain. This is stable and consistent with multiple sclerosis. There is no evidence of active demyelination. **This report has been created using voice recognition software. It may contain minor errors which are inherent in voice recognition technology. **      Final report electronically signed by Dr. Tatiana Shirley on 9/7/2017 9:23 AM     4/1/2021  8:09 AM - Mamie Boucher Incoming Lab Results From [facility]    Component Value Ref Range & Units Status Collected Lab   WBC 11.0High   4.8 - 10.8 thou/mm3 Final 04/01/2021  7:48 AM [facility]   RBC 5.31  4.70 - 6.10 mill/mm3 Final 04/01/2021  7:48 AM [facility]   Hemoglobin 15.6  14.0 - 18.0 gm/dl Final 04/01/2021  7:48 AM [facility]   Hematocrit 48.5  42.0 - 52.0 % Final 04/01/2021  7:48 AM [facility]   MCV 91.3  80.0 - 94.0 fL Final 04/01/2021  7:48 AM [facility]   MCH 29.4  26.0 - 33.0 pg Final 04/01/2021  7:48 AM [facility]   MCHC 32.2  32.2 - 35.5 gm/dl Final 04/01/2021  7:48 AM  [facility]   RDW-CV 13.6  11.5 - 14.5 % Final 04/01/2021  7:48 AM  [facility]   RDW-SD 45. 6High   35.0 - 45.0 fL Final 04/01/2021  7:48 AM  [facility]   Platelets 043  319 - 400 thou/mm3 Final 04/01/2021  7:48 AM  [facility]   MPV 9.1Low   9.4 - 12.4 fL Final 04/01/2021  7:48 AM  [facility]   Seg Neutrophils 66.0  % Final 04/01/2021  7:48 AM [facility]   Lymphocytes 17.7  % Final 04/01/2021  7:48 AM [facility]   Monocytes 11.5  % Final 04/01/2021  7:48 AM [facility]   Eosinophils 3.3  % Final 04/01/2021  7:48 AM [facility]   Basophils 1.0  % Final 04/01/2021  7:48 AM [facility]   Immature Granulocytes 0.5  % Final 04/01/2021  7:48 AM [facility]   Segs Absolute 7.3  1 - 7 thou/mm3 Final 04/01/2021  7:48 AM [facility]   Lymphocytes Absolute 1.9  1.0 - 4.8 thou/mm3 Final 04/01/2021  7:48 AM [facility]   Monocytes Absolute 1.3  0.4 - 1.3 thou/mm3 Final 04/01/2021  7:48 AM [facility]   Eosinophils Absolute 0.4  0.0 - 0.4 thou/mm3 Final 04/01/2021  7:48 AM [facility]   Basophils Absolute 0.1  0.0 - 0.1 thou/mm3 Final 04/01/2021  7:48 AM [facility]   Immature Grans (Abs) 0.06  0.00 - 0.07 thou/mm3 Final 04/01/2021  7:48 AM [facility]   nRBC 0  /100 wbc Final

## 2021-08-10 ENCOUNTER — HOSPITAL ENCOUNTER (OUTPATIENT)
Dept: NURSING | Age: 61
Discharge: HOME | End: 2021-08-10
Payer: MEDICARE

## 2021-08-10 VITALS
BODY MASS INDEX: 36.41 KG/M2 | OXYGEN SATURATION: 94 % | SYSTOLIC BLOOD PRESSURE: 120 MMHG | WEIGHT: 276 LBS | TEMPERATURE: 97.2 F | DIASTOLIC BLOOD PRESSURE: 75 MMHG | RESPIRATION RATE: 16 BRPM | HEART RATE: 68 BPM

## 2021-08-10 DIAGNOSIS — G35 MULTIPLE SCLEROSIS, RELAPSING-REMITTING (HCC): Primary | ICD-10-CM

## 2021-08-10 PROCEDURE — 96365 THER/PROPH/DIAG IV INF INIT: CPT

## 2021-08-10 PROCEDURE — 96366 THER/PROPH/DIAG IV INF ADDON: CPT

## 2021-08-10 PROCEDURE — 6360000002 HC RX W HCPCS: Performed by: NURSE PRACTITIONER

## 2021-08-10 PROCEDURE — 6370000000 HC RX 637 (ALT 250 FOR IP): Performed by: NURSE PRACTITIONER

## 2021-08-10 PROCEDURE — 2580000003 HC RX 258: Performed by: NURSE PRACTITIONER

## 2021-08-10 RX ORDER — DIPHENHYDRAMINE HYDROCHLORIDE 50 MG/ML
50 INJECTION INTRAMUSCULAR; INTRAVENOUS ONCE
Status: CANCELLED | OUTPATIENT
Start: 2022-01-11 | End: 2022-01-11

## 2021-08-10 RX ORDER — ACETAMINOPHEN 325 MG/1
650 TABLET ORAL ONCE
Status: COMPLETED | OUTPATIENT
Start: 2021-08-10 | End: 2021-08-10

## 2021-08-10 RX ORDER — SODIUM CHLORIDE 9 MG/ML
INJECTION, SOLUTION INTRAVENOUS CONTINUOUS
Status: ACTIVE | OUTPATIENT
Start: 2021-08-10 | End: 2021-08-10

## 2021-08-10 RX ORDER — DIPHENHYDRAMINE HYDROCHLORIDE 50 MG/ML
50 INJECTION INTRAMUSCULAR; INTRAVENOUS ONCE
Status: CANCELLED | OUTPATIENT
Start: 2022-01-11

## 2021-08-10 RX ORDER — ACETAMINOPHEN 325 MG/1
650 TABLET ORAL ONCE
Status: CANCELLED | OUTPATIENT
Start: 2022-01-11

## 2021-08-10 RX ORDER — SODIUM CHLORIDE 9 MG/ML
INJECTION, SOLUTION INTRAVENOUS CONTINUOUS
Status: CANCELLED | OUTPATIENT
Start: 2022-01-11

## 2021-08-10 RX ORDER — METHYLPREDNISOLONE SODIUM SUCCINATE 125 MG/2ML
125 INJECTION, POWDER, LYOPHILIZED, FOR SOLUTION INTRAMUSCULAR; INTRAVENOUS ONCE
Status: CANCELLED | OUTPATIENT
Start: 2022-01-11 | End: 2022-01-11

## 2021-08-10 RX ORDER — SODIUM CHLORIDE 0.9 % (FLUSH) 0.9 %
10 SYRINGE (ML) INJECTION PRN
Status: CANCELLED | OUTPATIENT
Start: 2022-01-11

## 2021-08-10 RX ORDER — DIPHENHYDRAMINE HYDROCHLORIDE 50 MG/ML
50 INJECTION INTRAMUSCULAR; INTRAVENOUS ONCE
Status: COMPLETED | OUTPATIENT
Start: 2021-08-10 | End: 2021-08-10

## 2021-08-10 RX ORDER — METHYLPREDNISOLONE SODIUM SUCCINATE 125 MG/2ML
100 INJECTION, POWDER, LYOPHILIZED, FOR SOLUTION INTRAMUSCULAR; INTRAVENOUS ONCE
Status: COMPLETED | OUTPATIENT
Start: 2021-08-10 | End: 2021-08-10

## 2021-08-10 RX ORDER — HEPARIN SODIUM (PORCINE) LOCK FLUSH IV SOLN 100 UNIT/ML 100 UNIT/ML
500 SOLUTION INTRAVENOUS PRN
Status: CANCELLED | OUTPATIENT
Start: 2022-01-11

## 2021-08-10 RX ORDER — SODIUM CHLORIDE 0.9 % (FLUSH) 0.9 %
5 SYRINGE (ML) INJECTION PRN
Status: CANCELLED | OUTPATIENT
Start: 2022-01-11

## 2021-08-10 RX ORDER — METHYLPREDNISOLONE SODIUM SUCCINATE 125 MG/2ML
100 INJECTION, POWDER, LYOPHILIZED, FOR SOLUTION INTRAMUSCULAR; INTRAVENOUS ONCE
Status: CANCELLED | OUTPATIENT
Start: 2022-01-11

## 2021-08-10 RX ADMIN — DIPHENHYDRAMINE HYDROCHLORIDE 50 MG: 50 INJECTION INTRAMUSCULAR; INTRAVENOUS at 08:42

## 2021-08-10 RX ADMIN — SODIUM CHLORIDE: 9 INJECTION, SOLUTION INTRAVENOUS at 08:33

## 2021-08-10 RX ADMIN — METHYLPREDNISOLONE SODIUM SUCCINATE 100 MG: 125 INJECTION, POWDER, FOR SOLUTION INTRAMUSCULAR; INTRAVENOUS at 08:42

## 2021-08-10 RX ADMIN — ACETAMINOPHEN 650 MG: 325 TABLET ORAL at 08:42

## 2021-08-10 ASSESSMENT — PAIN DESCRIPTION - LOCATION: LOCATION: GENERALIZED

## 2021-08-10 ASSESSMENT — PAIN SCALES - GENERAL
PAINLEVEL_OUTOF10: 3
PAINLEVEL_OUTOF10: 0

## 2021-08-10 ASSESSMENT — PAIN DESCRIPTION - PAIN TYPE: TYPE: CHRONIC PAIN

## 2021-08-10 NOTE — PROGRESS NOTES
0830: Patient arrived ambulatory with a cane for Ocrevus infusion. Patient denies any antibiotic usage or infection at this time. PT RIGHTS AND RESPONSIBILITIES OFFERED TO PT. Patient provided with beverage. 4824: Pre-medications administered. 0913: Ocrevus infusion started, patient tolerating well. 1030: Ocrevus infusion continued. Patient tolerating well.     1300: Ocrevus infusion complete, patient tolerated well. No signs/symptoms of reaction noted. 1400: No signs/symptoms of reaction noted. AVS reviewed with patient, voiced understanding. Patient discharged with cane.                            _m___ Safety:       (Environmental)   [place] to environment   Ensure ID band is correct and in place/ allergy band as needed   Assess for fall risk   Initiate fall precautions as applicable (fall band, side rails, etc.)   Call light within reach   Bed in low position/ wheels locked    _m___ Pain:        Assess pain level and characteristics   Administer analgesics as ordered   Assess effectiveness of pain management and report to MD as needed    _m___ Knowledge Deficit:   Assess baseline knowledge   Provide teaching at level of understanding   Provide teaching via preferred learning method   Evaluate teaching effectiveness    _m___ Hemodynamic/Respiratory Status:       (Pre and Post Procedure Monitoring)   Assess/Monitor vital signs and LOC   Assess Baseline SpO2 prior to any sedation   Obtain weight/height   Assess vital signs/ LOC until patient meets discharge criteria   Monitor procedure site and notify MD of any issues

## 2021-09-07 ENCOUNTER — OFFICE VISIT (OUTPATIENT)
Dept: PULMONOLOGY | Age: 61
End: 2021-09-07
Payer: MEDICARE

## 2021-09-07 VITALS
SYSTOLIC BLOOD PRESSURE: 138 MMHG | BODY MASS INDEX: 36.31 KG/M2 | DIASTOLIC BLOOD PRESSURE: 74 MMHG | HEART RATE: 75 BPM | WEIGHT: 274 LBS | OXYGEN SATURATION: 94 % | HEIGHT: 73 IN | TEMPERATURE: 96.2 F

## 2021-09-07 DIAGNOSIS — Z87.891 PERSONAL HISTORY OF TOBACCO USE, PRESENTING HAZARDS TO HEALTH: ICD-10-CM

## 2021-09-07 DIAGNOSIS — G47.33 OSA ON CPAP: ICD-10-CM

## 2021-09-07 DIAGNOSIS — G35 MULTIPLE SCLEROSIS, RELAPSING-REMITTING (HCC): ICD-10-CM

## 2021-09-07 DIAGNOSIS — J96.11 CHRONIC RESPIRATORY FAILURE WITH HYPOXIA (HCC): ICD-10-CM

## 2021-09-07 DIAGNOSIS — E66.9 OBESITY (BMI 35.0-39.9 WITHOUT COMORBIDITY): ICD-10-CM

## 2021-09-07 DIAGNOSIS — Z99.89 OSA ON CPAP: ICD-10-CM

## 2021-09-07 DIAGNOSIS — R53.83 FATIGUE, UNSPECIFIED TYPE: ICD-10-CM

## 2021-09-07 DIAGNOSIS — J44.9 COPD, SEVERE (HCC): Primary | ICD-10-CM

## 2021-09-07 PROCEDURE — G8926 SPIRO NO PERF OR DOC: HCPCS | Performed by: NURSE PRACTITIONER

## 2021-09-07 PROCEDURE — 1036F TOBACCO NON-USER: CPT | Performed by: NURSE PRACTITIONER

## 2021-09-07 PROCEDURE — G8427 DOCREV CUR MEDS BY ELIG CLIN: HCPCS | Performed by: NURSE PRACTITIONER

## 2021-09-07 PROCEDURE — 3017F COLORECTAL CA SCREEN DOC REV: CPT | Performed by: NURSE PRACTITIONER

## 2021-09-07 PROCEDURE — G8417 CALC BMI ABV UP PARAM F/U: HCPCS | Performed by: NURSE PRACTITIONER

## 2021-09-07 PROCEDURE — 3023F SPIROM DOC REV: CPT | Performed by: NURSE PRACTITIONER

## 2021-09-07 PROCEDURE — 99214 OFFICE O/P EST MOD 30 MIN: CPT | Performed by: NURSE PRACTITIONER

## 2021-09-07 RX ORDER — PREDNISONE 50 MG/1
50 TABLET ORAL DAILY
Qty: 5 TABLET | Refills: 0 | Status: SHIPPED | OUTPATIENT
Start: 2021-09-07 | End: 2021-09-12

## 2021-09-07 RX ORDER — ALBUTEROL SULFATE 90 UG/1
2 AEROSOL, METERED RESPIRATORY (INHALATION) 4 TIMES DAILY PRN
Qty: 18 G | Refills: 5 | Status: SHIPPED | OUTPATIENT
Start: 2021-09-07

## 2021-09-07 RX ORDER — LOSARTAN POTASSIUM 100 MG/1
100 TABLET ORAL DAILY
COMMUNITY

## 2021-09-07 ASSESSMENT — ENCOUNTER SYMPTOMS
NAUSEA: 0
WHEEZING: 1
VOMITING: 0
ALLERGIC/IMMUNOLOGIC NEGATIVE: 1
APNEA: 0
CHEST TIGHTNESS: 0
COUGH: 1
EYES NEGATIVE: 1
SHORTNESS OF BREATH: 1
ABDOMINAL PAIN: 0
DIARRHEA: 0

## 2021-09-07 NOTE — PATIENT INSTRUCTIONS
If you do not hear from central scheduling about the CT scan, call his office back to check on this.

## 2021-09-07 NOTE — PROGRESS NOTES
[facility]     Patient: Korina Ingram, 61 y.o.   : 1960    Pt of mine      Subjective     Chief Complaint   Patient presents with    Follow-up     COPD 1 year pulmonary follow up with no testing         HPI  Jacinda Ramirez is here for 1 year follow up for COPD and Emphysema  Declined LDCT last year. States saw PCP about 1 week ago, per pt he is going to be scheduled for Ct chest. Awaiting [facility] to call   C/o increased cough and increased mucous   Underlying MS / RODRIGUE- on CPAP   Now on Ocrevus , feels helping his weakness due to his MS   Current Inhalers:  Symbicort- using BID , not rinsing after use, no mouth sore / Albuterol nebs - once a day ,   No hospitalizations/ ER visits  No recent oral atb / steroids     Last Spirometry : Last Spirometry 2018: FEV 1 48%     Last 6MWT: On O2 at 2LPM PRN with activity - last 6 min walk completd 3/2017- has not been needing his oxygen routinely, does still use with more strenuous activity, or playing with grand kids  . Has pulse oximeter at home and monitors his SPO2 , gets O2 from [facility]   Using Incentive spirometer daily , has been able to increase       SOCIAL HISTORY:  Social History     Tobacco Use    Smoking status: Former Smoker     Packs/day: 3.00     Years: 35.00     Pack years: 105.00     Types: Cigarettes     Quit date: 2008     Years since quittin.9    Smokeless tobacco: Never Used   Vaping Use    Vaping Use: Never used   Substance Use Topics    Alcohol use: Not Currently     Comment: occas.     Drug use: No     Comment: younger years         CURRENT MEDICATIONS:  Current Outpatient Medications   Medication Sig Dispense Refill    losartan (COZAAR) 100 MG tablet Take 100 mg by mouth daily      albuterol sulfate HFA (VENTOLIN HFA) 108 (90 Base) MCG/ACT inhaler Inhale 2 puffs into the lungs 4 times daily as needed for Wheezing 18 g 5    predniSONE (DELTASONE) 50 MG tablet Take 1 tablet by mouth daily for 5 days 5 tablet 0    modafinil (PROVIGIL) 200 MG tablet 200 mg daily.  Ocrelizumab (OCREVUS IV) Infuse intravenously every 6 months      NIFEdipine (ADALAT CC) 30 MG extended release tablet TAKE 1 TABLET BY MOUTH ONCE DAILY ON AN EMPTY STOMACH      Umeclidinium Bromide (INCRUSE ELLIPTA) 62.5 MCG/INH AEPB INHALE 1 PUFF INTO LUNGS DAILY 90 each 3    hydroCHLOROthiazide (HYDRODIURIL) 25 MG tablet Take 25 mg by mouth daily      isosorbide mononitrate (IMDUR) 30 MG extended release tablet Take 30 mg by mouth daily      OXYGEN Inhale into the lungs as needed      JENTADUETO 2.5-1000 MG TABS       baclofen (LIORESAL) 20 MG tablet Take 1 tablet by mouth 2 times daily 60 tablet 8    atorvastatin (LIPITOR) 20 MG tablet Take 20 mg by mouth daily      Multiple Vitamin (MULTI VITAMIN DAILY PO) Take by mouth 2 times daily       albuterol (PROVENTIL) (2.5 MG/3ML) 0.083% nebulizer solution Take 2.5 mg by nebulization 4 times daily      SYMBICORT 160-4.5 MCG/ACT AERO       TRAMADOL HCL 50 mg by Does not apply route 3 times daily.  metoprolol (LOPRESSOR) 50 MG tablet Take 50 mg by mouth 2 times daily.  glimepiride (AMARYL) 4 MG tablet Take 4 mg by mouth 2 times daily       aspirin 81 MG tablet Take 325 mg by mouth daily        No current facility-administered medications for this visit. Radha LACKEY   Review of Systems   Constitutional: Positive for fatigue (fatigues with activity ). Negative for activity change, appetite change, chills, diaphoresis, fever and unexpected weight change. HENT: Negative. Eyes: Negative. Respiratory: Positive for cough (productive- increased phlemph in past few weeks, clear / white ), shortness of breath (with exertion ) and wheezing. Negative for apnea and chest tightness. Cardiovascular: Negative for chest pain, palpitations and leg swelling. Gastrointestinal: Negative for abdominal pain, diarrhea, nausea and vomiting. Genitourinary: Negative. Musculoskeletal: Negative. Skin: Negative. Allergic/Immunologic: Negative. Neurological: Positive for weakness. Hematological: Does not bruise/bleed easily. Psychiatric/Behavioral: Negative for sleep disturbance and suicidal ideas. Physical exam   /74 (Site: Left Upper Arm, Position: Sitting)   Pulse 75   Temp 96.2 °F (35.7 °C)   Ht 6' 1\" (1.854 m)   Wt 274 lb (124.3 kg)   SpO2 94% Comment: RA  BMI 36.15 kg/m²      Wt Readings from Last 3 Encounters:   09/07/21 274 lb (124.3 kg)   08/10/21 276 lb (125.2 kg)   07/26/21 277 lb (125.6 kg)     Physical Exam  Vitals and nursing note reviewed. Constitutional:       General: He is not in acute distress. Appearance: He is well-developed. He is obese. HENT:      Mouth/Throat:      Lips: Pink. Mouth: Mucous membranes are moist.      Pharynx: Oropharynx is clear. No oropharyngeal exudate or posterior oropharyngeal erythema. Eyes:      Conjunctiva/sclera: Conjunctivae normal.   Neck:      Vascular: No JVD. Cardiovascular:      Rate and Rhythm: Normal rate and regular rhythm. Heart sounds: No murmur heard. No friction rub. Pulmonary:      Effort: Pulmonary effort is normal. No accessory muscle usage or respiratory distress. Breath sounds: Examination of the right-middle field reveals wheezing. Examination of the right-lower field reveals wheezing. Wheezing present. No rhonchi or rales. Chest:      Chest wall: No tenderness. Musculoskeletal:      Right lower leg: No edema. Left lower leg: No edema. Skin:     General: Skin is warm and dry. Capillary Refill: Capillary refill takes less than 2 seconds. Nails: There is no clubbing. Neurological:      Mental Status: He is alert. Psychiatric:         Mood and Affect: Mood normal.         Behavior: Behavior normal.         Thought Content:  Thought content normal.         Judgment: Judgment normal.          Test results   Lung Nodule Screening [x] Qualifies    []Does not qualify   [] Declined    [] Completed     Assessment      Diagnosis Orders   1. COPD, severe ([facility] [address]     2. Chronic respiratory failure with hypoxia (HCC)     3. Obesity (BMI 35.0-39.9 without comorbidity)     4. RODRIGUE on CPAP     5. Multiple sclerosis, relapsing-remitting ([facility] [address]     6. Fatigue, unspecified type     7. Personal history of tobacco use, presenting hazards to health        Plan    Add albuterol HFA - escribed for PRN use   He was instructed to use Albuterol HFA  inhaler 2 puffs Q6h prn or Albuterol 2.5mg nebs Q6h prn (the nebulizer) at one time as rescue medication not both at the same time.  He verbalizes understanding.     -continue Symbicort BID, instructed to rinse after use   -qualifies for LDCT, sounds like PCP has ordered this, instructed pt to proceed , if does not hear from central scheduling call Dr Bladimir Sal office back to check on this  -prednisone 50 mg PO x 5 days for acute exac with wheezing, cough and increased mucous     Total time spent on encounter was 30 minutes    Will see Bryon Pineda in: 1 year    Electronically signed by CAROL Dickey CNP on 9/7/2021 at 10:20 AM

## 2021-12-13 ENCOUNTER — OFFICE VISIT (OUTPATIENT)
Dept: NEUROLOGY | Age: 61
End: 2021-12-13
Payer: MEDICARE

## 2021-12-13 VITALS
WEIGHT: 274.2 LBS | TEMPERATURE: 98 F | HEIGHT: 73 IN | BODY MASS INDEX: 36.34 KG/M2 | DIASTOLIC BLOOD PRESSURE: 72 MMHG | SYSTOLIC BLOOD PRESSURE: 136 MMHG | HEART RATE: 62 BPM | OXYGEN SATURATION: 98 %

## 2021-12-13 DIAGNOSIS — G35 MULTIPLE SCLEROSIS (HCC): Primary | ICD-10-CM

## 2021-12-13 DIAGNOSIS — R29.898 LEFT LEG WEAKNESS: ICD-10-CM

## 2021-12-13 PROCEDURE — 3017F COLORECTAL CA SCREEN DOC REV: CPT | Performed by: PSYCHIATRY & NEUROLOGY

## 2021-12-13 PROCEDURE — G8417 CALC BMI ABV UP PARAM F/U: HCPCS | Performed by: PSYCHIATRY & NEUROLOGY

## 2021-12-13 PROCEDURE — G8484 FLU IMMUNIZE NO ADMIN: HCPCS | Performed by: PSYCHIATRY & NEUROLOGY

## 2021-12-13 PROCEDURE — 99213 OFFICE O/P EST LOW 20 MIN: CPT | Performed by: PSYCHIATRY & NEUROLOGY

## 2021-12-13 PROCEDURE — G8427 DOCREV CUR MEDS BY ELIG CLIN: HCPCS | Performed by: PSYCHIATRY & NEUROLOGY

## 2021-12-13 PROCEDURE — 1036F TOBACCO NON-USER: CPT | Performed by: PSYCHIATRY & NEUROLOGY

## 2021-12-13 NOTE — PROGRESS NOTES
NEUROLOGY OUT PATIENT FOLLOW UP NOTE:  12/13/20212:35 PM    Annabella Vasquez is here for follow up for multiple sclerosis. He denies any falls, his lower extremity weakness is unchanged. He is evaluated to go over plan of care going forward. Allergies   Allergen Reactions    Pcn [Penicillins]        Current Outpatient Medications:     losartan (COZAAR) 100 MG tablet, Take 100 mg by mouth daily, Disp: , Rfl:     albuterol sulfate HFA (VENTOLIN HFA) 108 (90 Base) MCG/ACT inhaler, Inhale 2 puffs into the lungs 4 times daily as needed for Wheezing, Disp: 18 g, Rfl: 5    modafinil (PROVIGIL) 200 MG tablet, 200 mg daily. , Disp: , Rfl:     Ocrelizumab (OCREVUS IV), Infuse intravenously every 6 months, Disp: , Rfl:     NIFEdipine (ADALAT CC) 30 MG extended release tablet, TAKE 1 TABLET BY MOUTH ONCE DAILY ON AN EMPTY STOMACH, Disp: , Rfl:     Umeclidinium Bromide (INCRUSE ELLIPTA) 62.5 MCG/INH AEPB, INHALE 1 PUFF INTO LUNGS DAILY, Disp: 90 each, Rfl: 3    hydroCHLOROthiazide (HYDRODIURIL) 25 MG tablet, Take 25 mg by mouth daily, Disp: , Rfl:     isosorbide mononitrate (IMDUR) 30 MG extended release tablet, Take 30 mg by mouth daily, Disp: , Rfl:     OXYGEN, Inhale into the lungs as needed, Disp: , Rfl:     JENTADUETO 2.5-1000 MG TABS, , Disp: , Rfl:     baclofen (LIORESAL) 20 MG tablet, Take 1 tablet by mouth 2 times daily, Disp: 60 tablet, Rfl: 8    atorvastatin (LIPITOR) 20 MG tablet, Take 20 mg by mouth daily, Disp: , Rfl:     Multiple Vitamin (MULTI VITAMIN DAILY PO), Take by mouth 2 times daily , Disp: , Rfl:     albuterol (PROVENTIL) (2.5 MG/3ML) 0.083% nebulizer solution, Take 2.5 mg by nebulization 4 times daily, Disp: , Rfl:     SYMBICORT 160-4.5 MCG/ACT AERO, , Disp: , Rfl:     TRAMADOL HCL, 50 mg by Does not apply route 3 times daily.  , Disp: , Rfl:     glimepiride (AMARYL) 4 MG tablet, Take 4 mg by mouth 2 times daily , Disp: , Rfl:     aspirin 81 MG tablet, Take 325 mg by mouth daily , Disp: , Rfl:     metoprolol (LOPRESSOR) 50 MG tablet, Take 50 mg by mouth 2 times daily. (Patient not taking: Reported on 12/13/2021), Disp: , Rfl:       PE:   Vitals:    12/13/21 1408   BP: (!) 142/82   Site: Left Upper Arm   Position: Sitting   Cuff Size: Medium Adult   Pulse: 62   Temp: 98 °F (36.7 °C)   TempSrc: Skin   SpO2: 98%   Weight: 274 lb 3.2 oz (124.4 kg)   Height: 6' 1\" (1.854 m)     General Appearance: awake, alert, oriented, in no acute distress, he is wearing a mask. Gen: NAD, Language is Intact. Skin: no rash, lesion, dry to touch. warm  Head: no rash, no icterus  Neck: There is no carotid bruits. The Neck is supple. There is no neck lymphadenopathy. Neuro: CN 2-12 grossly intact with no focal deficits. Power -5/5 left leg power, he has a left ankle brace in place. Otherwise 5/5 in all other extremities. No spasticity noted. Reflexes are symmetric. Long tracts are intact. Cerebellar exam is Intact. Sensory exam is intact to light touch. Gait is limping, uses cane. Musculoskeletal: Has no hand arthritis, no limitation of ROM in any of the four extremities.   Lower extremities no edema          DATA:  Results for orders placed or performed in visit on 09/08/21   CBC   Result Value Ref Range    WBC 13.4 (H) 4.4 - 10.5 th/cmm    RBC 5.03 4.50 - 6.00 mil/cmm    Hemoglobin 15.3 13.5 - 16.5 gm/dL    Hematocrit 44.5 40.0 - 49.0 %    MCV 88.4 80 - 97 CU ODETTE    MCH 30.4 27.5 - 33.0 PG    MCHC 34.3 33.0 - 36.0 gm/dL    RDW 13.6 12.0 - 16.0 %    Platelets 624 154 - 477 th/cmm    Neutrophils % 72.1 (H) 40 - 70 %    Lymphocytes % 15.2 15 - 45 %    Monocytes % 10.1 (H) 2 - 10 %    Eosinophils % 1.8 0 - 6 %    Basophils % 0.8 0 - 2 %    Nucleated RBCs 0.0 <1 /100 WBC    Absolute Neut # 9700 (H) 1800 - 7700 /cmm    Absolute Lymph # 2000 1000 - 4800 /cmm    Absolute Mono # 1400 (H) 0 - 800 /cmm    Absolute Eos # 200 0 - 500 /cmm    Absolute Baso # 100 0 - 200 /cmm   Basic Metabolic Panel, Fasting   Result the brain. There is no evidence of active demyelination. The flow voids are normal.  The midline craniocervical junction structures are normal.  The brainstem and pituitary gland are normal.    There is no significant change in the appearance of the brain compared to the prior study. There is a trace amount of fluid signal in the left mastoid air cells. Impression    1. Mild amount of abnormal signal scattered in the white matter of the brain. This is stable and consistent with multiple sclerosis. There is no evidence of active demyelination. **This report has been created using voice recognition software. It may contain minor errors which are inherent in voice recognition technology. **      Final report electronically signed by Dr. Kalani Lan on 9/7/2017 9:23 AM       MRI Brain 6/2021:  Impression       1. Moderate amount of abnormal signal in the white matter the brain consistent with multiple sclerosis. 2. No evidence of new white matter lesions. No active demyelination.                   **This report has been created using voice recognition software. It may contain minor errors which are inherent in voice recognition technology. **           Final report electronically signed by Dr. Kalani Lan on 6/19/2021 10:54 AM         Assessment:     Diagnosis Orders   1. Multiple sclerosis ([address]     2. Left leg weakness          The patient is seen as follow-up for multiple sclerosis, left leg weakness. Reports feeling the same compared to last evaluation with his multiple sclerosis symptoms. most recent blood work was 9/2021, CBC and hepatic panel satisfactory. He denies any side effects to the medication. He uses a cane to ambulate. He denies any vision changes. His most recent MRI brain was stable. He still on baclofen 20 mg twice a day for neck spasm that appears to be stable. He denies any vision changes. After detailed discussion with patient we agreed on the following plan. Plan:  1. Continue with Ocrevus infusions  2. CBC, Hepatic function panel, 4/2022  3. Continue Baclofen to 20 mg twice a day. Refills given. 4. Take calcium and Vitamin D daily. 5. Stay physically active   6. Follow up in 6 months or sooner if needed. 7. Report any new events. Call if any questions or concerns      Total time 25 min.      Hortencia Roldan MD

## 2021-12-13 NOTE — PATIENT INSTRUCTIONS
1. Continue with Ocrevus infusions  2. CBC, Hepatic function panel, 4/2022  3. Continue Baclofen to 20 mg twice a day. Refills given. 4. Take calcium and Vitamin D daily. 5. Stay physically active   6. Follow up in 6 months or sooner if needed. 7. Report any new events.  Call if any questions or concerns

## 2022-01-28 RX ORDER — UMECLIDINIUM 62.5 UG/1
AEROSOL, POWDER ORAL
Qty: 90 EACH | Refills: 0 | Status: SHIPPED | OUTPATIENT
Start: 2022-01-28 | End: 2022-05-10

## 2022-02-11 ENCOUNTER — HOSPITAL ENCOUNTER (OUTPATIENT)
Dept: NURSING | Age: 62
Discharge: HOME | End: 2022-02-11
Payer: MEDICARE

## 2022-02-11 VITALS
WEIGHT: 272 LBS | HEART RATE: 75 BPM | OXYGEN SATURATION: 92 % | TEMPERATURE: 97.4 F | RESPIRATION RATE: 18 BRPM | DIASTOLIC BLOOD PRESSURE: 97 MMHG | SYSTOLIC BLOOD PRESSURE: 152 MMHG | BODY MASS INDEX: 35.89 KG/M2

## 2022-02-11 DIAGNOSIS — G35 MULTIPLE SCLEROSIS, RELAPSING-REMITTING (HCC): Primary | Chronic | ICD-10-CM

## 2022-02-11 DIAGNOSIS — G35 MULTIPLE SCLEROSIS, RELAPSING-REMITTING (HCC): Primary | ICD-10-CM

## 2022-02-11 PROCEDURE — 6370000000 HC RX 637 (ALT 250 FOR IP): Performed by: NURSE PRACTITIONER

## 2022-02-11 PROCEDURE — 96375 TX/PRO/DX INJ NEW DRUG ADDON: CPT

## 2022-02-11 PROCEDURE — 96366 THER/PROPH/DIAG IV INF ADDON: CPT

## 2022-02-11 PROCEDURE — 6360000002 HC RX W HCPCS: Performed by: NURSE PRACTITIONER

## 2022-02-11 PROCEDURE — 96365 THER/PROPH/DIAG IV INF INIT: CPT

## 2022-02-11 PROCEDURE — 2580000003 HC RX 258: Performed by: NURSE PRACTITIONER

## 2022-02-11 RX ORDER — ACETAMINOPHEN 325 MG/1
650 TABLET ORAL ONCE
Status: CANCELLED | OUTPATIENT
Start: 2022-07-29

## 2022-02-11 RX ORDER — ACETAMINOPHEN 325 MG/1
650 TABLET ORAL ONCE
Status: COMPLETED | OUTPATIENT
Start: 2022-02-11 | End: 2022-02-11

## 2022-02-11 RX ORDER — DIPHENHYDRAMINE HYDROCHLORIDE 50 MG/ML
50 INJECTION INTRAMUSCULAR; INTRAVENOUS
OUTPATIENT
Start: 2022-07-29

## 2022-02-11 RX ORDER — SODIUM CHLORIDE 9 MG/ML
INJECTION, SOLUTION INTRAVENOUS CONTINUOUS
Status: CANCELLED | OUTPATIENT
Start: 2022-02-11

## 2022-02-11 RX ORDER — ONDANSETRON 2 MG/ML
8 INJECTION INTRAMUSCULAR; INTRAVENOUS
OUTPATIENT
Start: 2022-07-29

## 2022-02-11 RX ORDER — DIPHENHYDRAMINE HYDROCHLORIDE 50 MG/ML
50 INJECTION INTRAMUSCULAR; INTRAVENOUS ONCE
Status: CANCELLED | OUTPATIENT
Start: 2022-07-29

## 2022-02-11 RX ORDER — SODIUM CHLORIDE 9 MG/ML
25 INJECTION, SOLUTION INTRAVENOUS PRN
Status: CANCELLED | OUTPATIENT
Start: 2022-02-11

## 2022-02-11 RX ORDER — SODIUM CHLORIDE 9 MG/ML
INJECTION, SOLUTION INTRAVENOUS CONTINUOUS
Status: ACTIVE | OUTPATIENT
Start: 2022-02-11 | End: 2022-02-11

## 2022-02-11 RX ORDER — HEPARIN SODIUM (PORCINE) LOCK FLUSH IV SOLN 100 UNIT/ML 100 UNIT/ML
500 SOLUTION INTRAVENOUS PRN
OUTPATIENT
Start: 2022-07-29

## 2022-02-11 RX ORDER — SODIUM CHLORIDE 0.9 % (FLUSH) 0.9 %
5-40 SYRINGE (ML) INJECTION PRN
Status: CANCELLED | OUTPATIENT
Start: 2022-02-11

## 2022-02-11 RX ORDER — DIPHENHYDRAMINE HYDROCHLORIDE 50 MG/ML
50 INJECTION INTRAMUSCULAR; INTRAVENOUS ONCE
Status: COMPLETED | OUTPATIENT
Start: 2022-02-11 | End: 2022-02-11

## 2022-02-11 RX ORDER — METHYLPREDNISOLONE SODIUM SUCCINATE 125 MG/2ML
100 INJECTION, POWDER, LYOPHILIZED, FOR SOLUTION INTRAMUSCULAR; INTRAVENOUS ONCE
Status: CANCELLED | OUTPATIENT
Start: 2022-07-29

## 2022-02-11 RX ORDER — ACETAMINOPHEN 325 MG/1
650 TABLET ORAL
Status: CANCELLED | OUTPATIENT
Start: 2022-02-11

## 2022-02-11 RX ORDER — ACETAMINOPHEN 325 MG/1
650 TABLET ORAL
OUTPATIENT
Start: 2022-07-29

## 2022-02-11 RX ORDER — ACETAMINOPHEN 325 MG/1
650 TABLET ORAL ONCE
Status: CANCELLED | OUTPATIENT
Start: 2022-02-11

## 2022-02-11 RX ORDER — HEPARIN SODIUM (PORCINE) LOCK FLUSH IV SOLN 100 UNIT/ML 100 UNIT/ML
500 SOLUTION INTRAVENOUS PRN
Status: CANCELLED | OUTPATIENT
Start: 2022-02-11

## 2022-02-11 RX ORDER — SODIUM CHLORIDE 9 MG/ML
25 INJECTION, SOLUTION INTRAVENOUS PRN
OUTPATIENT
Start: 2022-07-29

## 2022-02-11 RX ORDER — DIPHENHYDRAMINE HYDROCHLORIDE 50 MG/ML
50 INJECTION INTRAMUSCULAR; INTRAVENOUS
Status: CANCELLED | OUTPATIENT
Start: 2022-02-11

## 2022-02-11 RX ORDER — ONDANSETRON 2 MG/ML
8 INJECTION INTRAMUSCULAR; INTRAVENOUS
Status: CANCELLED | OUTPATIENT
Start: 2022-02-11

## 2022-02-11 RX ORDER — ALBUTEROL SULFATE 90 UG/1
4 AEROSOL, METERED RESPIRATORY (INHALATION) PRN
OUTPATIENT
Start: 2022-07-29

## 2022-02-11 RX ORDER — DIPHENHYDRAMINE HYDROCHLORIDE 50 MG/ML
50 INJECTION INTRAMUSCULAR; INTRAVENOUS ONCE
Status: CANCELLED | OUTPATIENT
Start: 2022-02-11

## 2022-02-11 RX ORDER — METHYLPREDNISOLONE SODIUM SUCCINATE 125 MG/2ML
100 INJECTION, POWDER, LYOPHILIZED, FOR SOLUTION INTRAMUSCULAR; INTRAVENOUS ONCE
Status: CANCELLED | OUTPATIENT
Start: 2022-02-11

## 2022-02-11 RX ORDER — METHYLPREDNISOLONE SODIUM SUCCINATE 125 MG/2ML
100 INJECTION, POWDER, LYOPHILIZED, FOR SOLUTION INTRAMUSCULAR; INTRAVENOUS ONCE
Status: COMPLETED | OUTPATIENT
Start: 2022-02-11 | End: 2022-02-11

## 2022-02-11 RX ORDER — EPINEPHRINE 1 MG/ML
0.3 INJECTION, SOLUTION, CONCENTRATE INTRAVENOUS PRN
Status: CANCELLED | OUTPATIENT
Start: 2022-02-11

## 2022-02-11 RX ORDER — ALBUTEROL SULFATE 90 UG/1
4 AEROSOL, METERED RESPIRATORY (INHALATION) PRN
Status: CANCELLED | OUTPATIENT
Start: 2022-02-11

## 2022-02-11 RX ORDER — SODIUM CHLORIDE 9 MG/ML
INJECTION, SOLUTION INTRAVENOUS CONTINUOUS
OUTPATIENT
Start: 2022-07-29

## 2022-02-11 RX ORDER — SODIUM CHLORIDE 9 MG/ML
INJECTION, SOLUTION INTRAVENOUS CONTINUOUS
Status: CANCELLED | OUTPATIENT
Start: 2022-07-29

## 2022-02-11 RX ORDER — SODIUM CHLORIDE 0.9 % (FLUSH) 0.9 %
5-40 SYRINGE (ML) INJECTION PRN
Status: CANCELLED | OUTPATIENT
Start: 2022-07-29

## 2022-02-11 RX ADMIN — METHYLPREDNISOLONE SODIUM SUCCINATE 100 MG: 125 INJECTION, POWDER, FOR SOLUTION INTRAMUSCULAR; INTRAVENOUS at 09:10

## 2022-02-11 RX ADMIN — SODIUM CHLORIDE: 9 INJECTION, SOLUTION INTRAVENOUS at 09:17

## 2022-02-11 RX ADMIN — ACETAMINOPHEN 650 MG: 325 TABLET ORAL at 09:10

## 2022-02-11 RX ADMIN — OCRELIZUMAB 600 MG: 300 INJECTION INTRAVENOUS at 09:45

## 2022-02-11 RX ADMIN — DIPHENHYDRAMINE HYDROCHLORIDE 50 MG: 50 INJECTION, SOLUTION INTRAMUSCULAR; INTRAVENOUS at 09:10

## 2022-02-11 ASSESSMENT — PAIN DESCRIPTION - PAIN TYPE: TYPE: CHRONIC PAIN

## 2022-02-11 ASSESSMENT — PAIN SCALES - GENERAL
PAINLEVEL_OUTOF10: 2
PAINLEVEL_OUTOF10: 2

## 2022-02-11 ASSESSMENT — PAIN DESCRIPTION - LOCATION: LOCATION: GENERALIZED

## 2022-02-11 NOTE — PROGRESS NOTES
0830 Patient ambulatory to [facility] for Ocrevus infusion. Patient denies any recent infections or antibiotic use. Patient verbalizes understanding of medication. PT RIGHTS AND RESPONSIBILITIES OFFERED TO PT.    200 Spoke with Cora's office to update order in computer. 4110 Pre meds given to patient. 0945 Infusion started. 1015 Infusion increased. Patient tolerating. 1045 Infusion increased. Patient tolerating. Denies any complaints. 1115 Infusion increase. Patient denies any complaints. Vital signs stable. 1145 Infusion at max rate. Patient doing well. Denies any complaints. 1230 Patient resting in room. Infusion continues. 1335 Infusion complete. Patient denies any complaints. 1415 Patient resting in room. Denies any complaints. Vital signs stable. AVS reviewed with patient. Verbalizes understanding. 1430 Patient left ambulatory to discharge lobby.            _M___ Safety:       (Environmental)   [place] to environment   Ensure ID band is correct and in place/ allergy band as needed   Assess for fall risk   Initiate fall precautions as applicable (fall band, side rails, etc.)   Call light within reach   Bed in low position/ wheels locked    _M__ Pain:        Assess pain level and characteristics   Administer analgesics as ordered   Assess effectiveness of pain management and report to MD as needed    _M___ Knowledge Deficit:   Assess baseline knowledge   Provide teaching at level of understanding   Provide teaching via preferred learning method   Evaluate teaching effectiveness    _M___ Hemodynamic/Respiratory Status:       (Pre and Post Procedure Monitoring)   Assess/Monitor vital signs and LOC   Assess Baseline SpO2 prior to any sedation   Obtain weight/height   Assess vital signs/ LOC until patient meets discharge criteria   Monitor procedure site and notify MD of any issues

## 2022-05-10 RX ORDER — UMECLIDINIUM 62.5 UG/1
AEROSOL, POWDER ORAL
Qty: 90 EACH | Refills: 1 | Status: SHIPPED | OUTPATIENT
Start: 2022-05-10 | End: 2022-10-31

## 2022-06-27 ENCOUNTER — OFFICE VISIT (OUTPATIENT)
Dept: NEUROLOGY | Age: 62
End: 2022-06-27
Payer: MEDICARE

## 2022-06-27 VITALS
HEART RATE: 68 BPM | HEIGHT: 73 IN | DIASTOLIC BLOOD PRESSURE: 64 MMHG | OXYGEN SATURATION: 97 % | SYSTOLIC BLOOD PRESSURE: 118 MMHG | BODY MASS INDEX: 33.93 KG/M2 | WEIGHT: 256 LBS

## 2022-06-27 DIAGNOSIS — G35 MULTIPLE SCLEROSIS (HCC): Primary | ICD-10-CM

## 2022-06-27 DIAGNOSIS — M62.838 MUSCLE SPASMS OF BOTH LOWER EXTREMITIES: ICD-10-CM

## 2022-06-27 PROCEDURE — G8427 DOCREV CUR MEDS BY ELIG CLIN: HCPCS | Performed by: NURSE PRACTITIONER

## 2022-06-27 PROCEDURE — 1036F TOBACCO NON-USER: CPT | Performed by: NURSE PRACTITIONER

## 2022-06-27 PROCEDURE — 3017F COLORECTAL CA SCREEN DOC REV: CPT | Performed by: NURSE PRACTITIONER

## 2022-06-27 PROCEDURE — 99213 OFFICE O/P EST LOW 20 MIN: CPT | Performed by: NURSE PRACTITIONER

## 2022-06-27 PROCEDURE — G8417 CALC BMI ABV UP PARAM F/U: HCPCS | Performed by: NURSE PRACTITIONER

## 2022-06-27 RX ORDER — BACLOFEN 20 MG/1
20 TABLET ORAL 2 TIMES DAILY
Qty: 60 TABLET | Refills: 8 | Status: SHIPPED | OUTPATIENT
Start: 2022-06-27

## 2022-06-27 NOTE — PROGRESS NOTES
NEUROLOGY OUT PATIENT FOLLOW UP NOTE:  6/27/20222:18 PM    Aelm Diaz is here for follow up for multiple sclerosis. ROS:  Respiratory : no cough, no shortness of breath  Cardiac: no chest pain. No palpitations. Renal : no flank pain, no hematuria    Skin: no rash      Allergies   Allergen Reactions    Pcn [Penicillins]        Current Outpatient Medications:     INCRUSE ELLIPTA 62.5 MCG/INH AEPB, Inhale 1 puff by mouth once daily, Disp: 90 each, Rfl: 1    losartan (COZAAR) 100 MG tablet, Take 100 mg by mouth daily, Disp: , Rfl:     albuterol sulfate HFA (VENTOLIN HFA) 108 (90 Base) MCG/ACT inhaler, Inhale 2 puffs into the lungs 4 times daily as needed for Wheezing, Disp: 18 g, Rfl: 5    modafinil (PROVIGIL) 200 MG tablet, 200 mg daily. , Disp: , Rfl:     Ocrelizumab (OCREVUS IV), Infuse intravenously every 6 months, Disp: , Rfl:     NIFEdipine (ADALAT CC) 30 MG extended release tablet, TAKE 1 TABLET BY MOUTH ONCE DAILY ON AN EMPTY STOMACH, Disp: , Rfl:     hydroCHLOROthiazide (HYDRODIURIL) 25 MG tablet, Take 25 mg by mouth daily, Disp: , Rfl:     isosorbide mononitrate (IMDUR) 30 MG extended release tablet, Take 30 mg by mouth daily, Disp: , Rfl:     OXYGEN, Inhale into the lungs as needed, Disp: , Rfl:     JENTADUETO 2.5-1000 MG TABS, , Disp: , Rfl:     baclofen (LIORESAL) 20 MG tablet, Take 1 tablet by mouth 2 times daily, Disp: 60 tablet, Rfl: 8    atorvastatin (LIPITOR) 20 MG tablet, Take 20 mg by mouth daily, Disp: , Rfl:     Multiple Vitamin (MULTI VITAMIN DAILY PO), Take by mouth 2 times daily , Disp: , Rfl:     albuterol (PROVENTIL) (2.5 MG/3ML) 0.083% nebulizer solution, Take 2.5 mg by nebulization 4 times daily, Disp: , Rfl:     SYMBICORT 160-4.5 MCG/ACT AERO, , Disp: , Rfl:     TRAMADOL HCL, 50 mg by Does not apply route 3 times daily.  , Disp: , Rfl:     metoprolol (LOPRESSOR) 50 MG tablet, Take 50 mg by mouth 2 times daily  , Disp: , Rfl:     glimepiride (AMARYL) 4 MG tablet, Take 4 mg by mouth 2 times daily , Disp: , Rfl:     aspirin 81 MG tablet, Take 325 mg by mouth daily , Disp: , Rfl:     I reviewed the past medical history, allergies, medications, social history and family history. PE:   Vitals:    06/27/22 1410   BP: 118/64   Site: Left Upper Arm   Position: Sitting   Cuff Size: Medium Adult   Pulse: 68   SpO2: 97%   Weight: 256 lb (116.1 kg)   Height: 6' 1\" (1.854 m)     General Appearance: awake, alert, oriented, in no acute distress, he is wearing a mask. Gen: NAD, Language is Intact. Skin: no rash, lesion, dry to touch. warm  Head: no rash, no icterus  Neck: There is no carotid bruits. The Neck is supple. There is no neck lymphadenopathy. Neuro: CN 2-12 grossly intact with no focal deficits. Power -5/5 left leg power, he has a left ankle brace in place. Otherwise 5/5 in all other extremities. No spasticity noted. Reflexes are symmetric. Long tracts are intact. Cerebellar exam is Intact. Sensory exam is intact to light touch. Gait is limping, uses cane. Musculoskeletal: Has no hand arthritis, no limitation of ROM in any of the four extremities.   Lower extremities no edema          DATA:      Results for orders placed or performed in visit on 06/09/22   CBC   Result Value Ref Range    WBC 12.2 (H) 4.4 - 10.5 th/cmm    RBC 5.08 4.50 - 6.00 mil/cmm    Hemoglobin 15.2 13.5 - 16.5 gm/dL    Hematocrit 45.0 40.0 - 49.0 %    MCV 88.5 80 - 97 CU ODETTE    MCH 30.0 27.5 - 33.0 PG    MCHC 33.9 33.0 - 36.0 gm/dL    RDW 13.7 12.0 - 16.0 %    Platelets 192 712 - 492 th/cmm    Neutrophils % 72.6 (H) 40 - 70 %    Lymphocytes % 15.1 15 - 45 %    Monocytes % 9.4 2 - 10 %    Eosinophils % 2.4 0 - 6 %    Basophils % 0.5 0 - 2 %    Nucleated RBCs 0.0 <1 /100 WBC    Absolute Neut # 8800 (H) 1800 - 7700 /cmm    Absolute Lymph # 1800 1000 - 4800 /cmm    Absolute Mono # 1200 (H) 0 - 800 /cmm    Absolute Eos # 300 0 - 500 /cmm    Absolute Baso # 100 0 - 200 /cmm   Basic Metabolic Panel, Fasting Result Value Ref Range    Sodium 137 135 - 145 mEq/L    Potassium 4.1 3.6 - 5.0 mEq/L    Chloride 99 (L) 101 - 111 mEq/L    CO2 28 21 - 32 mEq/L    Anion Gap 10 4 - 12    Glucose, Fasting 154 (H) 70 - 110 mg/dL    BUN 16 7 - 20 mg/dL    CREATININE 0.74 0.60 - 1.30 mg/dL    Creatinine Clearance >60     Calcium 9.20 8.8 - 10.5 mg/dL   Hepatic Function Panel   Result Value Ref Range    AST 23 15 - 41 IU/L    Alkaline Phosphatase 73 41 - 137 IU/L    Total Bilirubin 0.7 0.2 - 1.0 mg/dL    Bilirubin, Direct 0.1 0.1 - 0.2 mg/dL    Albumin 4.4 3.5 - 5.0 g/dL    Total Protein 7.6 6.2 - 8.0 g/dL    ALT 26 10 - 40 IU/L   Lipid Panel   Result Value Ref Range    LDL Direct 95 mg/dL    Cholesterol 145 <200 mg/dL    Triglycerides 146 <150 mg/dL    HDL 38 (L) mg/dL    CHOLESTEROL/HDL RELATIVE RISK 3.8 (L) 4.0 - 5.0    Direct-LDL / HDL Risk 2.5 <3.1    VLDL 29 <39 mg/dL   PSA Screening   Result Value Ref Range    PSA 0.87 ng/mL   Hemoglobin A1C   Result Value Ref Range    Hemoglobin A1C 6.4 4.4 - 6.4 %    eAG 137 mg/dL   Microalbumin / Creatinine Urine Ratio   Result Value Ref Range    CREATININE, RANDOM URINE 85.0 mg/dL    Microalbumin, Random Urine 86.6 mg/L    Microalbumin Creatinine Ratio 101.8 (H) 0 - 30.0 mg/Gm          No results found for this or any previous visit. No results found for this or any previous visit. No results found for this or any previous visit. No results found for this or any previous visit. No results found for this or any previous visit. Results for orders placed during the hospital encounter of 06/19/21    MRI BRAIN W WO CONTRAST    Narrative  PROCEDURE: MRI BRAIN W WO CONTRAST    CLINICAL INFORMATIONMultiple sclerosis ([address] Follow-up. COMPARISON: Brain MRI 10/22/2020.     TECHNIQUE: Multiplanar and multiple spin echo T1 and T2-weighted images were obtained through the brain before and after the administration of intravenous contrast. High resolution sagittal FLAIR images were also obtained. FINDINGS:    Current lesion burden of white matter: moderate  Interval since the most recent exam: 8 months. Interval new lesion development: None. Number of pathologically enhancing lesions of the white matter: None. The diffusion-weighted images are normal. The brain volume is mildly reduced. .There are no intra-or extra-axial collections. There is no hydrocephalus, midline shift or mass effect. On the FLAIR and T2-weighted sequences, there is a moderate amount of abnormal signal scattered between the deep and subcortical white matter posterior hemispheres. All of these foci are stable. There is no new white matter signal.    On the gradient echo T2-weighted images, there is there is a stable small focus of susceptibility artifact in the right parietal lobe cortex. This is unchanged. There is no new susceptibility artifact present. There is no abnormal enhancement in the brain. The major intracranial vascular flow voids are present. The midline craniocervical junction structures are normal.  The brainstem and pituitary gland are normal.    There is a stable trace amount of fluid signal in the inferior left mastoid air cells. There is no significant change in the appearance of the brain compared to the prior study. Impression  1. Moderate amount of abnormal signal in the white matter the brain consistent with multiple sclerosis. 2. No evidence of new white matter lesions. No active demyelination. **This report has been created using voice recognition software. It may contain minor errors which are inherent in voice recognition technology. **      Final report electronically signed by Dr. Wilbert Arita on 6/19/2021 10:54 AM    No results found for this or any previous visit. No results found for this or any previous visit. Assessment:     Diagnosis Orders   1.  Multiple sclerosis ([address]  MRI BRAIN W WO CONTRAST    CBC with Auto Differential    Hepatic Function Panel 2. Muscle spasms of both lower extremities  CBC with Auto Differential    Hepatic Function Panel    baclofen (LIORESAL) 20 MG tablet        The patient is seen as follow-up for multiple sclerosis, left leg weakness. Reports feeling the same compared to last evaluation with his multiple sclerosis symptoms. He denies any falls. He is on Ocrevus and is tolerating the medication well. He still on baclofen 20 mg twice a day as needed  Spasm. We will arrange for him to undergo MRI brain W/WO contrast to assess for disease progression. After detailed discussion with patient we agreed on the following plan. Plan:  1. MRI brain W/WO contrast   2. Continue with Ocrevus as scheduled  3. CBC, Hepatic function panel, 12/2022  4. Continue Baclofen to 20 mg twice a day. Refills given. 5. Take calcium and Vitamin D daily. 6. Stay physically active   7. Follow up in 6 months or sooner if needed. 8. Report any new events.  Call if any questions or concerns     Total time 24 min    CAROL Tinajero - CNP

## 2022-06-27 NOTE — PATIENT INSTRUCTIONS
1. MRI brain W/WO contrast   2. Continue with Ocrevus as scheduled  3. CBC, Hepatic function panel, 12/2022  4. Continue Baclofen to 20 mg twice a day. Refills given. 5. Take calcium and Vitamin D daily. 6. Stay physically active   7. Follow up in 6 months or sooner if needed. 8. Report any new events.  Call if any questions or concerns

## 2022-08-01 ENCOUNTER — HOSPITAL ENCOUNTER (OUTPATIENT)
Dept: MRI IMAGING | Age: 62
Discharge: HOME | End: 2022-08-01
Payer: MEDICARE

## 2022-08-01 DIAGNOSIS — G35 MULTIPLE SCLEROSIS (HCC): ICD-10-CM

## 2022-08-01 PROCEDURE — A9579 GAD-BASE MR CONTRAST NOS,1ML: HCPCS | Performed by: NURSE PRACTITIONER

## 2022-08-01 PROCEDURE — 6360000004 HC RX CONTRAST MEDICATION: Performed by: NURSE PRACTITIONER

## 2022-08-01 PROCEDURE — 70553 MRI BRAIN STEM W/O & W/DYE: CPT

## 2022-08-01 RX ADMIN — GADOTERIDOL 20 ML: 279.3 INJECTION, SOLUTION INTRAVENOUS at 11:51

## 2022-08-12 ENCOUNTER — HOSPITAL ENCOUNTER (OUTPATIENT)
Dept: NURSING | Age: 62
Discharge: HOME | End: 2022-08-12

## 2022-08-16 ENCOUNTER — HOSPITAL ENCOUNTER (OUTPATIENT)
Dept: NURSING | Age: 62
Discharge: HOME | End: 2022-08-16
Payer: MEDICARE

## 2022-08-16 VITALS
WEIGHT: 252 LBS | TEMPERATURE: 97.8 F | SYSTOLIC BLOOD PRESSURE: 139 MMHG | HEART RATE: 69 BPM | DIASTOLIC BLOOD PRESSURE: 74 MMHG | OXYGEN SATURATION: 96 % | BODY MASS INDEX: 33.25 KG/M2 | RESPIRATION RATE: 18 BRPM

## 2022-08-16 DIAGNOSIS — G35 MULTIPLE SCLEROSIS, RELAPSING-REMITTING (HCC): Primary | ICD-10-CM

## 2022-08-16 PROCEDURE — 6360000002 HC RX W HCPCS: Performed by: NURSE PRACTITIONER

## 2022-08-16 PROCEDURE — 96365 THER/PROPH/DIAG IV INF INIT: CPT

## 2022-08-16 PROCEDURE — 2500000003 HC RX 250 WO HCPCS

## 2022-08-16 PROCEDURE — 2580000003 HC RX 258: Performed by: NURSE PRACTITIONER

## 2022-08-16 PROCEDURE — 6370000000 HC RX 637 (ALT 250 FOR IP): Performed by: NURSE PRACTITIONER

## 2022-08-16 PROCEDURE — 96366 THER/PROPH/DIAG IV INF ADDON: CPT

## 2022-08-16 RX ORDER — HEPARIN SODIUM (PORCINE) LOCK FLUSH IV SOLN 100 UNIT/ML 100 UNIT/ML
500 SOLUTION INTRAVENOUS PRN
OUTPATIENT
Start: 2023-01-10

## 2022-08-16 RX ORDER — SODIUM CHLORIDE 9 MG/ML
INJECTION, SOLUTION INTRAVENOUS CONTINUOUS
Status: ACTIVE | OUTPATIENT
Start: 2022-08-16 | End: 2022-08-16

## 2022-08-16 RX ORDER — SODIUM CHLORIDE 9 MG/ML
INJECTION, SOLUTION INTRAVENOUS CONTINUOUS
OUTPATIENT
Start: 2023-01-10

## 2022-08-16 RX ORDER — DIPHENHYDRAMINE HYDROCHLORIDE 50 MG/ML
50 INJECTION INTRAMUSCULAR; INTRAVENOUS ONCE
Status: COMPLETED | OUTPATIENT
Start: 2022-08-16 | End: 2022-08-16

## 2022-08-16 RX ORDER — DIPHENHYDRAMINE HYDROCHLORIDE 50 MG/ML
50 INJECTION INTRAMUSCULAR; INTRAVENOUS
OUTPATIENT
Start: 2023-01-10

## 2022-08-16 RX ORDER — ACETAMINOPHEN 325 MG/1
650 TABLET ORAL ONCE
OUTPATIENT
Start: 2023-01-10

## 2022-08-16 RX ORDER — ONDANSETRON 2 MG/ML
8 INJECTION INTRAMUSCULAR; INTRAVENOUS
OUTPATIENT
Start: 2023-01-10

## 2022-08-16 RX ORDER — SODIUM CHLORIDE 0.9 % (FLUSH) 0.9 %
5-40 SYRINGE (ML) INJECTION PRN
OUTPATIENT
Start: 2023-01-10

## 2022-08-16 RX ORDER — SODIUM CHLORIDE 0.9 % (FLUSH) 0.9 %
5-40 SYRINGE (ML) INJECTION PRN
Status: DISCONTINUED | OUTPATIENT
Start: 2022-08-16 | End: 2022-08-17 | Stop reason: HOSPADM

## 2022-08-16 RX ORDER — ACETAMINOPHEN 325 MG/1
650 TABLET ORAL
OUTPATIENT
Start: 2023-01-10

## 2022-08-16 RX ORDER — METHYLPREDNISOLONE SODIUM SUCCINATE 125 MG/2ML
100 INJECTION, POWDER, LYOPHILIZED, FOR SOLUTION INTRAMUSCULAR; INTRAVENOUS ONCE
Status: COMPLETED | OUTPATIENT
Start: 2022-08-16 | End: 2022-08-16

## 2022-08-16 RX ORDER — DIPHENHYDRAMINE HYDROCHLORIDE 50 MG/ML
50 INJECTION INTRAMUSCULAR; INTRAVENOUS ONCE
OUTPATIENT
Start: 2023-01-10

## 2022-08-16 RX ORDER — SODIUM CHLORIDE 9 MG/ML
25 INJECTION, SOLUTION INTRAVENOUS PRN
OUTPATIENT
Start: 2023-01-10

## 2022-08-16 RX ORDER — ALBUTEROL SULFATE 90 UG/1
4 AEROSOL, METERED RESPIRATORY (INHALATION) PRN
OUTPATIENT
Start: 2023-01-10

## 2022-08-16 RX ORDER — ACETAMINOPHEN 325 MG/1
650 TABLET ORAL ONCE
Status: COMPLETED | OUTPATIENT
Start: 2022-08-16 | End: 2022-08-16

## 2022-08-16 RX ORDER — METHYLPREDNISOLONE SODIUM SUCCINATE 125 MG/2ML
100 INJECTION, POWDER, LYOPHILIZED, FOR SOLUTION INTRAMUSCULAR; INTRAVENOUS ONCE
OUTPATIENT
Start: 2023-01-10

## 2022-08-16 RX ADMIN — SODIUM CHLORIDE, PRESERVATIVE FREE 10 ML: 5 INJECTION INTRAVENOUS at 08:11

## 2022-08-16 RX ADMIN — SODIUM CHLORIDE, PRESERVATIVE FREE 10 ML: 5 INJECTION INTRAVENOUS at 12:31

## 2022-08-16 RX ADMIN — SODIUM CHLORIDE: 9 INJECTION, SOLUTION INTRAVENOUS at 08:05

## 2022-08-16 RX ADMIN — DIPHENHYDRAMINE HYDROCHLORIDE 50 MG: 50 INJECTION INTRAMUSCULAR; INTRAVENOUS at 08:12

## 2022-08-16 RX ADMIN — SODIUM CHLORIDE 600 MG: 9 INJECTION, SOLUTION INTRAVENOUS at 08:45

## 2022-08-16 RX ADMIN — METHYLPREDNISOLONE SODIUM SUCCINATE 100 MG: 125 INJECTION, POWDER, FOR SOLUTION INTRAMUSCULAR; INTRAVENOUS at 08:08

## 2022-08-16 RX ADMIN — ACETAMINOPHEN 650 MG: 325 TABLET ORAL at 08:08

## 2022-08-16 ASSESSMENT — PAIN - FUNCTIONAL ASSESSMENT: PAIN_FUNCTIONAL_ASSESSMENT: 0-10

## 2022-08-16 NOTE — PROGRESS NOTES
__m__ Safety:       (Environmental)  [place] to environment  Ensure ID band is correct and in place/ allergy band as needed  Assess for fall risk  Initiate fall precautions as applicable (fall band, side rails, etc.)  Call light within reach  Bed in low position/ wheels locked    __m__ Pain:       Assess pain level and characteristics  Administer analgesics as ordered  Assess effectiveness of pain management and report to MD as needed    ___m_ Knowledge Deficit:  Assess baseline knowledge  Provide teaching at level of understanding  Provide teaching via preferred learning method  Evaluate teaching effectiveness    __m__ Hemodynamic/Respiratory Status:       (Pre and Post Procedure Monitoring)  Assess/Monitor vital signs and LOC  Assess Baseline SpO2 prior to any sedation  Obtain weight/height  Assess vital signs/ LOC until patient meets discharge criteria  Monitor procedure site and notify MD of any issues    _

## 2022-08-16 NOTE — DISCHARGE INSTRUCTIONS
ACTIVITY:  Continue usual care with your doctor. Call your doctor immediately if any severe problems or go to the nearest emergency room. Please see Ocrevus Medication Guide at discharge. Your next [facility] [place] in fusion is scheduled for Thursday February 16th at 8:30am   Please call if you have any questions [phone]    I have been treated and hereby acknowledge receiving this instruction sheet.

## 2022-09-06 ENCOUNTER — OFFICE VISIT (OUTPATIENT)
Dept: PULMONOLOGY | Age: 62
End: 2022-09-06
Payer: MEDICARE

## 2022-09-06 VITALS
OXYGEN SATURATION: 98 % | BODY MASS INDEX: 32.23 KG/M2 | HEART RATE: 70 BPM | TEMPERATURE: 98 F | WEIGHT: 243.2 LBS | SYSTOLIC BLOOD PRESSURE: 138 MMHG | DIASTOLIC BLOOD PRESSURE: 80 MMHG | HEIGHT: 73 IN

## 2022-09-06 DIAGNOSIS — G35 MULTIPLE SCLEROSIS, RELAPSING-REMITTING (HCC): ICD-10-CM

## 2022-09-06 DIAGNOSIS — Z99.89 OSA ON CPAP: ICD-10-CM

## 2022-09-06 DIAGNOSIS — G47.33 OSA ON CPAP: ICD-10-CM

## 2022-09-06 DIAGNOSIS — J96.11 CHRONIC RESPIRATORY FAILURE WITH HYPOXIA (HCC): ICD-10-CM

## 2022-09-06 DIAGNOSIS — Z87.891 PERSONAL HISTORY OF TOBACCO USE: Primary | ICD-10-CM

## 2022-09-06 DIAGNOSIS — E66.9 OBESITY (BMI 35.0-39.9 WITHOUT COMORBIDITY): ICD-10-CM

## 2022-09-06 DIAGNOSIS — J44.9 COPD, SEVERE (HCC): ICD-10-CM

## 2022-09-06 PROCEDURE — G8427 DOCREV CUR MEDS BY ELIG CLIN: HCPCS | Performed by: NURSE PRACTITIONER

## 2022-09-06 PROCEDURE — G8417 CALC BMI ABV UP PARAM F/U: HCPCS | Performed by: NURSE PRACTITIONER

## 2022-09-06 PROCEDURE — 99214 OFFICE O/P EST MOD 30 MIN: CPT | Performed by: NURSE PRACTITIONER

## 2022-09-06 PROCEDURE — 1036F TOBACCO NON-USER: CPT | Performed by: NURSE PRACTITIONER

## 2022-09-06 PROCEDURE — G0296 VISIT TO DETERM LDCT ELIG: HCPCS | Performed by: NURSE PRACTITIONER

## 2022-09-06 PROCEDURE — 3017F COLORECTAL CA SCREEN DOC REV: CPT | Performed by: NURSE PRACTITIONER

## 2022-09-06 PROCEDURE — 3023F SPIROM DOC REV: CPT | Performed by: NURSE PRACTITIONER

## 2022-09-06 ASSESSMENT — ENCOUNTER SYMPTOMS
COUGH: 1
EYES NEGATIVE: 1
VOMITING: 0
NAUSEA: 0
WHEEZING: 0
ABDOMINAL PAIN: 0
SHORTNESS OF BREATH: 1
DIARRHEA: 0

## 2022-09-06 NOTE — PROGRESS NOTES
[facility]     Patient: Jay Cueva, 64 y.o.   : 1960    Pt of Mine      Subjective     Chief Complaint   Patient presents with    Follow-up     1 year COPD no testing        HPI  Jose Jung is here for 1 year follow up for COPD    Current symptoms:   Patient is experiencing SOB: yes, on long exertions  Patient is experiencing wheezing: Yes  Patient states they have had a non-productive cough. Phlegm is none  Patient is coughing up blood: no  Patient has been experiencing chest pains: non-existent  Patient is currently taking the following inhaler(s): Incruse, Albuterol, Symbicort  Patient is currently taking the following nebulizer treatment(s): Albuterol- has not been using      Patient is using their rescue inhaler 3 times per day. (Pt thought he had to use 4 times per day)   underlying MS, feels sx stable, on Ocrevus. Is on CPAP at night for RODRIGUE     Any recent exacerbations that have required oral atb or steroids No  Any new medical issues since last visit : No  Using incentive spirometry daily     Last spirometry: 2018- severe obstruction     Patient is on home oxygen therapy:   Current Oxygen order: 2LPM with activity, only uses with strenuous activity, not with normal walking     Last 6 min walk: 2017  is  using O2 compliantly   Oxygen from [facility]     SOCIAL HISTORY:  Social History     Tobacco Use    Smoking status: Former     Packs/day: 3.00     Years: 35.00     Pack years: 105.00     Types: Cigarettes     Quit date: 2008     Years since quittin.9    Smokeless tobacco: Never   Vaping Use    Vaping Use: Never used   Substance Use Topics    Alcohol use: Not Currently     Comment: occas.     Drug use: No     Comment: younger years         CURRENT MEDICATIONS:  Current Outpatient Medications   Medication Sig Dispense Refill    baclofen (LIORESAL) 20 MG tablet Take 1 tablet by mouth 2 times daily 60 tablet 8    INCRUSE ELLIPTA 62.5 MCG/INH AEPB Inhale 1 puff by mouth once daily 90 each 1    losartan (COZAAR) 100 MG tablet Take 100 mg by mouth daily      albuterol sulfate HFA (VENTOLIN HFA) 108 (90 Base) MCG/ACT inhaler Inhale 2 puffs into the lungs 4 times daily as needed for Wheezing 18 g 5    modafinil (PROVIGIL) 200 MG tablet 200 mg daily. Ocrelizumab (OCREVUS IV) Infuse intravenously every 6 months      NIFEdipine (ADALAT CC) 30 MG extended release tablet TAKE 1 TABLET BY MOUTH ONCE DAILY ON AN EMPTY STOMACH      hydroCHLOROthiazide (HYDRODIURIL) 25 MG tablet Take 25 mg by mouth daily      isosorbide mononitrate (IMDUR) 30 MG extended release tablet Take 30 mg by mouth daily      JENTADUETO 2.5-1000 MG TABS       atorvastatin (LIPITOR) 20 MG tablet Take 20 mg by mouth daily      Multiple Vitamin (MULTI VITAMIN DAILY PO) Take by mouth 2 times daily       SYMBICORT 160-4.5 MCG/ACT AERO       TRAMADOL HCL 50 mg by Does not apply route 3 times daily. metoprolol (LOPRESSOR) 50 MG tablet Take 50 mg by mouth 2 times daily        glimepiride (AMARYL) 4 MG tablet Take 4 mg by mouth 2 times daily       aspirin 81 MG tablet Take 325 mg by mouth daily       OXYGEN Inhale into the lungs as needed (Patient not taking: Reported on 9/6/2022)      albuterol (PROVENTIL) (2.5 MG/3ML) 0.083% nebulizer solution Take 2.5 mg by nebulization 4 times daily (Patient not taking: Reported on 9/6/2022)       No current facility-administered medications for this visit. Lavelle LACKEY   Review of Systems   Constitutional:  Positive for fatigue. Negative for activity change, appetite change, chills, fever and unexpected weight change. HENT: Negative. Eyes: Negative. Respiratory:  Positive for cough and shortness of breath. Negative for wheezing. Cardiovascular:  Negative for chest pain, palpitations and leg swelling. Gastrointestinal:  Negative for abdominal pain, diarrhea, nausea and vomiting. Genitourinary: Negative. Musculoskeletal: Negative.     Skin: Negative. Neurological:  Positive for weakness. Hematological: Negative. Psychiatric/Behavioral: Negative. Negative for sleep disturbance. Physical exam   /80 (Site: Right Upper Arm, Position: Sitting, Cuff Size: Medium Adult)   Pulse 70   Temp 98 °F (36.7 °C) (Skin)   Ht 6' 1\" (1.854 m)   Wt 243 lb 3.2 oz (110.3 kg)   SpO2 98%   BMI 32.09 kg/m²       Wt Readings from Last 3 Encounters:   09/06/22 243 lb 3.2 oz (110.3 kg)   08/16/22 252 lb (114.3 kg)   08/01/22 255 lb (115.7 kg)     Physical Exam  Vitals and nursing note reviewed. Constitutional:       General: He is not in acute distress. Appearance: He is well-developed and overweight. HENT:      Mouth/Throat:      Lips: Pink. Mouth: Mucous membranes are moist.      Pharynx: Oropharynx is clear. No oropharyngeal exudate or posterior oropharyngeal erythema. Eyes:      Conjunctiva/sclera: Conjunctivae normal.   Neck:      Vascular: No JVD. Cardiovascular:      Rate and Rhythm: Normal rate and regular rhythm. Heart sounds: No murmur heard. No friction rub. Pulmonary:      Effort: Pulmonary effort is normal. No accessory muscle usage or respiratory distress. Breath sounds: Normal breath sounds. No wheezing, rhonchi or rales. Chest:      Chest wall: No tenderness. Musculoskeletal:      Right lower leg: No edema. Left lower leg: No edema. Skin:     General: Skin is warm and dry. Capillary Refill: Capillary refill takes less than 2 seconds. Nails: There is no clubbing. Neurological:      Mental Status: He is alert and oriented to person, place, and time. Psychiatric:         Mood and Affect: Mood normal.         Behavior: Behavior normal.         Thought Content:  Thought content normal.         Judgment: Judgment normal.        Test results   Lung Nodule Screening     [x] Qualifies    []Does not qualify   [] Declined    [] Completed     Assessment       ICD-10-CM    1. Personal history of tobacco use  Z87.891 IA VISIT TO DISCUSS LUNG CA SCREEN W LDCT     CT Lung Screen (Annual)      2. COPD, severe ([facility] [address]  J44.9       3. Chronic respiratory failure with hypoxia (HCC)  J96.11       4. Obesity (BMI 35.0-39.9 without comorbidity)  E66.9       5. RODRIGUE on CPAP  G47.33     Z99.89       6. Multiple sclerosis, relapsing-remitting ([facility] [address]  G35           Plan      -COPD Symptoms currently optimally controlled , continue Symbicort, Incruse daily as prescribed continue current dosiung   -try to reduce use of albuterol to PRN only and not daily unless needed for symptom control   -educated on difference between short acting and long acting inhalers.  -avoid ill contacts  -is now willing to go for baseline Ct lung screen - order placed   -keep UTD on recommended vaccinations    Will see Ernie Manrique in: 1 month to review Ct results   billing based on medical decision making   Electronically signed by CAROL Castaneda CNP on 9/6/2022 at 10:22 AM     Low Dose CT (LDCT) Lung Screening criteria met:     Age 50-77(Medicare) or 50-80 (USPST)   Pack year smoking >20   Still smoking or less than 15 year since quit   No sign or symptoms of lung cancer   > 11 months since last LDCT     Risks and benefits of lung cancer screening with LDCT scans discussed:    Significance of positive screen - False-positive LDCT results often occur. 95% of all positive results do not lead to a diagnosis of cancer. Usually further imaging can resolve most false-positive results; however, some patients may require invasive procedures. Over diagnosis risk - 10% to 12% of screen-detected lung cancer cases are over diagnosed--that is, the cancer would not have been detected in the patient's lifetime without the screening.     Need for follow up screens annually to continue lung cancer screening effectiveness     Risks associated with radiation from annual LDCT- Radiation exposure is about the same as for a mammogram, which is about 1/3 of the annual background radiation exposure from everyday life. Starting screening at age 54 is not likely to increase cancer risk from radiation exposure. Patients with comorbidities resulting in life expectancy of < 10 years, or that would preclude treatment of an abnormality identified on CT, should not be screened due to lack of benefit.     To obtain maximal benefit from this screening, smoking cessation and long-term abstinence from smoking is critical

## 2022-09-06 NOTE — PROGRESS NOTES
Patient is experiencing SOB: yes, on long exertions    Patient is experiencing wheezing: Yes    Patient states they have had a non-productive cough. Phlegm is none    Patient is coughing up blood: no    Patient has been experiencing chest pains: non-existent    Patient is currently taking the following inhaler(s): Incruse, Albuterol, Symbicort    Patient is currently taking the following nebulizer treatment(s): Albuterol    Patient is using their rescue inhaler 3 times per day.  (Pt thought he had to use 4 times per day)

## 2022-10-20 ENCOUNTER — HOSPITAL ENCOUNTER (OUTPATIENT)
Dept: CT IMAGING | Age: 62
Discharge: HOME | End: 2022-10-20
Payer: MEDICARE

## 2022-10-20 DIAGNOSIS — Z87.891 PERSONAL HISTORY OF TOBACCO USE: ICD-10-CM

## 2022-10-20 PROCEDURE — 71271 CT THORAX LUNG CANCER SCR C-: CPT

## 2022-10-26 NOTE — TELEPHONE ENCOUNTER
Received refill request for Incruse. Medication was last ordered by Nyla. Medication was last ordered on 5/10/22 with 1 refills. Patient was last seen in the office 9/6/22. Patient has a scheduled follow up 11/14/22. Medication needs to be sent to [facility] DR KELLEY MEREDITH.

## 2022-11-14 ENCOUNTER — OFFICE VISIT (OUTPATIENT)
Dept: PULMONOLOGY | Age: 62
End: 2022-11-14
Payer: MEDICARE

## 2022-11-14 VITALS
OXYGEN SATURATION: 93 % | TEMPERATURE: 97.6 F | DIASTOLIC BLOOD PRESSURE: 70 MMHG | SYSTOLIC BLOOD PRESSURE: 130 MMHG | HEART RATE: 74 BPM | HEIGHT: 73 IN | WEIGHT: 243.8 LBS | BODY MASS INDEX: 32.31 KG/M2

## 2022-11-14 DIAGNOSIS — J96.11 CHRONIC RESPIRATORY FAILURE WITH HYPOXIA (HCC): ICD-10-CM

## 2022-11-14 DIAGNOSIS — E66.9 OBESITY (BMI 35.0-39.9 WITHOUT COMORBIDITY): ICD-10-CM

## 2022-11-14 DIAGNOSIS — G35 MULTIPLE SCLEROSIS, RELAPSING-REMITTING (HCC): ICD-10-CM

## 2022-11-14 DIAGNOSIS — Z87.891 PERSONAL HISTORY OF TOBACCO USE: ICD-10-CM

## 2022-11-14 DIAGNOSIS — J44.9 COPD, SEVERE (HCC): Primary | ICD-10-CM

## 2022-11-14 PROCEDURE — G8417 CALC BMI ABV UP PARAM F/U: HCPCS | Performed by: NURSE PRACTITIONER

## 2022-11-14 PROCEDURE — G8427 DOCREV CUR MEDS BY ELIG CLIN: HCPCS | Performed by: NURSE PRACTITIONER

## 2022-11-14 PROCEDURE — 99214 OFFICE O/P EST MOD 30 MIN: CPT | Performed by: NURSE PRACTITIONER

## 2022-11-14 PROCEDURE — 3017F COLORECTAL CA SCREEN DOC REV: CPT | Performed by: NURSE PRACTITIONER

## 2022-11-14 PROCEDURE — G8484 FLU IMMUNIZE NO ADMIN: HCPCS | Performed by: NURSE PRACTITIONER

## 2022-11-14 PROCEDURE — G0296 VISIT TO DETERM LDCT ELIG: HCPCS | Performed by: NURSE PRACTITIONER

## 2022-11-14 PROCEDURE — 1036F TOBACCO NON-USER: CPT | Performed by: NURSE PRACTITIONER

## 2022-11-14 PROCEDURE — 3023F SPIROM DOC REV: CPT | Performed by: NURSE PRACTITIONER

## 2022-11-14 RX ORDER — BUDESONIDE AND FORMOTEROL FUMARATE DIHYDRATE 160; 4.5 UG/1; UG/1
2 AEROSOL RESPIRATORY (INHALATION) 2 TIMES DAILY
Qty: 3 EACH | Refills: 3 | Status: SHIPPED | OUTPATIENT
Start: 2022-11-14

## 2022-11-14 ASSESSMENT — ENCOUNTER SYMPTOMS
VOMITING: 0
EYES NEGATIVE: 1
SHORTNESS OF BREATH: 1
COUGH: 0
ABDOMINAL PAIN: 0
NAUSEA: 0
WHEEZING: 0
DIARRHEA: 0

## 2022-11-14 NOTE — PATIENT INSTRUCTIONS

## 2022-11-14 NOTE — PROGRESS NOTES
[facility]     Patient: Cher Hutchison, 64 y.o.   : 1960    [facility]      Subjective     Chief Complaint   Patient presents with    Follow-up     2 month COPD follow up with CT, 10/20/22. HPI  Robina Heredia is here for 2 month  follow up for COPD  With Ct lung screen     History of COVID 19 in 2022, home test (+) ,called PCP and took \" bunch of pills he called in \"   Using albuterol nebs 2x per day   Symbicort 160/4.5 mcg 2 puffs BID, Incruse daily   albuteroL HFA- rarely   SOB only with exertional activity which is rare.-He is pretty happy with the way his COPD has been doing. He has been stable  Any recent exacerbations that have required oral atb or steroids No  Any new medical issues since last visit : No      SOCIAL HISTORY:  Social History     Tobacco Use    Smoking status: Former     Packs/day: 3.00     Years: 35.00     Pack years: 105.00     Types: Cigarettes     Quit date: 2008     Years since quittin.1    Smokeless tobacco: Never   Vaping Use    Vaping Use: Never used   Substance Use Topics    Alcohol use: Not Currently     Comment: occas. Drug use: No     Comment: younger years         CURRENT MEDICATIONS:  Current Outpatient Medications   Medication Sig Dispense Refill    Umeclidinium Bromide (INCRUSE ELLIPTA) 62.5 MCG/INH AEPB Inhale 1 puff into the lungs in the morning and at bedtime 90 each 3    SYMBICORT 160-4.5 MCG/ACT AERO Inhale 2 puffs into the lungs 2 times daily 3 each 3    baclofen (LIORESAL) 20 MG tablet Take 1 tablet by mouth 2 times daily 60 tablet 8    losartan (COZAAR) 100 MG tablet Take 100 mg by mouth daily      albuterol sulfate HFA (VENTOLIN HFA) 108 (90 Base) MCG/ACT inhaler Inhale 2 puffs into the lungs 4 times daily as needed for Wheezing 18 g 5    modafinil (PROVIGIL) 200 MG tablet 200 mg daily.       Ocrelizumab (OCREVUS IV) Infuse intravenously every 6 months      NIFEdipine (ADALAT CC) 30 MG extended release tablet TAKE 1 TABLET BY MOUTH ONCE DAILY ON AN EMPTY STOMACH      hydroCHLOROthiazide (HYDRODIURIL) 25 MG tablet Take 25 mg by mouth daily      isosorbide mononitrate (IMDUR) 30 MG extended release tablet Take 30 mg by mouth daily      JENTADUETO 2.5-1000 MG TABS       atorvastatin (LIPITOR) 20 MG tablet Take 20 mg by mouth daily      Multiple Vitamin (MULTI VITAMIN DAILY PO) Take by mouth 2 times daily       albuterol (PROVENTIL) (2.5 MG/3ML) 0.083% nebulizer solution Take 2.5 mg by nebulization 4 times daily Patient used 2 times a day rather than 4 times a day. TRAMADOL HCL 50 mg by Does not apply route 3 times daily. metoprolol (LOPRESSOR) 50 MG tablet Take 50 mg by mouth 2 times daily        glimepiride (AMARYL) 4 MG tablet Take 4 mg by mouth 2 times daily       aspirin 81 MG tablet Take 325 mg by mouth daily       OXYGEN Inhale into the lungs as needed (Patient not taking: No sig reported)       No current facility-administered medications for this visit. Nas LACKEY   Review of Systems   Constitutional:  Negative for activity change, appetite change, chills, fatigue, fever and unexpected weight change. HENT: Negative. Eyes: Negative. Respiratory:  Positive for shortness of breath. Negative for cough and wheezing. Cardiovascular:  Negative for chest pain, palpitations and leg swelling. Gastrointestinal:  Negative for abdominal pain, diarrhea, nausea and vomiting. Genitourinary: Negative. Musculoskeletal:  Positive for gait problem. Skin: Negative. Neurological:  Positive for weakness. Hematological: Negative. Psychiatric/Behavioral: Negative.         Physical exam   /70 (Site: Left Upper Arm, Position: Sitting, Cuff Size: Medium Adult)   Pulse 74   Temp 97.6 °F (36.4 °C) (Oral)   Ht 6' 1\" (1.854 m)   Wt 243 lb 12.8 oz (110.6 kg)   SpO2 93%   BMI 32.17 kg/m²       Wt Readings from Last 3 Encounters:   11/14/22 243 lb 12.8 oz (110.6 kg)   09/06/22 243 lb 3.2 oz (110.3 kg)   08/16/22 252 lb (114.3 kg)     Physical Exam  Vitals and nursing note reviewed. Constitutional:       General: He is not in acute distress. Appearance: He is well-developed and overweight. HENT:      Mouth/Throat:      Lips: Pink. Mouth: Mucous membranes are moist.      Pharynx: Oropharynx is clear. No oropharyngeal exudate or posterior oropharyngeal erythema. Eyes:      Conjunctiva/sclera: Conjunctivae normal.   Neck:      Vascular: No JVD. Cardiovascular:      Rate and Rhythm: Normal rate and regular rhythm. Heart sounds: No murmur heard. No friction rub. Pulmonary:      Effort: Pulmonary effort is normal. No accessory muscle usage or respiratory distress. Breath sounds: Normal breath sounds. No wheezing, rhonchi or rales. Chest:      Chest wall: No tenderness. Musculoskeletal:      Right lower leg: No edema. Left lower leg: No edema. Skin:     General: Skin is warm and dry. Capillary Refill: Capillary refill takes less than 2 seconds. Nails: There is no clubbing. Neurological:      Mental Status: He is alert and oriented to person, place, and time. Psychiatric:         Mood and Affect: Mood normal.         Behavior: Behavior normal.         Thought Content: Thought content normal.         Judgment: Judgment normal.        Test results   Lung Nodule Screening     [x] Qualifies    []Does not qualify   [] Declined    [x] Completed     Ct lung screen 10/20/22- reviewed      FINDINGS:   LUNGS NODULES:   1. There is a 5 mm partially solid nodule at the superior aspect of left lower lobe seen on image 164, not definitely visualized on previous exam though this area may have been obscured by atelectasis. 2. No other focal lung nodules are identified. LYMPHADENOPATHY:   1. There are no pathologically enlarged lymph nodes. OTHER (LUNGS/MEDIASTINUM/MUSCULOSKELETAL/ABDOMEN):   1.  There is mild scarlike opacity at the periphery of the left lower lobe extending to the pleura. 2. There are surgical changes from prior CABG, similar to prior exam.               Impression   1. 5 mm subsolid nodule in the left upper lobe. 2. There are no pathologically enlarged lymph nodes. 3. LUNGRADS ASSESSMENT VALUE: 2,           The LUNG RADS RECOMMENDATIONS for monitoring lung nodules listed below (ACR- Lung-RADS Version 1.0 Assessment Categories)       LUNG RADS RECOMMENDATIONS;   1.  Normal, continue annual screening   2. Benign appearance or behavior, continue annual screening   3.  6 month CT recommended   4A.  3 month CT recommended; may consider PET/CT   4B. Additional diagnostics and/or tissue sampling recommended   4X. Additional diagnostics and/or tissue sampling recommended         **This report has been created using voice recognition software. It may contain minor errors which are inherent in voice recognition technology. **       Final report electronically signed by Dr. Maryanne Wallace MD on 10/20/2022 9:12 AM     Assessment       ICD-10-CM    1. COPD, severe ([facility]) Controlled J44.9       2. Personal history of tobacco use  Z87.891 [place] VISIT TO DISCUSS LUNG CA SCREEN W LDCT     CT Lung Screen (Annual)      3. Chronic respiratory failure with hypoxia ([facility])  J96.11       4. Obesity (BMI 35.0-39.9 without comorbidity)  E66.9       5.  Multiple sclerosis, relapsing-remitting ([facility])  G35             Plan      -COPD Symptoms currently optimally controlled , continue current therapies with Symbicort/ Incruse , Albuterol   -ct stable, continue annual screening - orders placed.   -avoid ill contacts  -keep UTD on recommended vaccinations    (Please note that portions of this note may have been with a voice recognition program.  Efforts were made to edit the dictation but occasionally words are mis-transcribed )     Will see Maya Drafts in: 1 year  billing based on time: total time on encounter 30 min   Electronically signed by Phoebe Meyers APRN - CNP on 11/14/2022 at 1:23 PM   Discussed with the patient the current USPSTF guidelines released March 9, 2021 for screening for lung cancer. For adults aged 48 to [de-identified] years who have a 20 pack-year smoking history and currently smoke or have quit within the past 15 years the grade B recommendation is to:  Screen for lung cancer with low-dose computed tomography (LDCT) every year. Stop screening once a person has not smoked for 15 years or has a health problem that limits life expectancy or the ability to have lung surgery. The patient  reports that he quit smoking about 14 years ago. His smoking use included cigarettes. He has a 105.00 pack-year smoking history. He has never used smokeless tobacco.. Discussed with patient the risks and benefits of screening, including over-diagnosis, false positive rate, and total radiation exposure. The patient currently exhibits no signs or symptoms suggestive of lung cancer. Discussed with patient the importance of compliance with yearly annual lung cancer screenings and willingness to undergo diagnosis and treatment if screening scan is positive. In addition, the patient was counseled regarding the importance of remaining smoke free and/or total smoking cessation.     Also reviewed the following if the patient has Medicare that as of February 10, 2022, Medicare only covers LDCT screening in patients aged 51-72 with at least a 20 pack-year smoking history who currently smoke or have quit in the last 15 years

## 2022-12-30 ENCOUNTER — OFFICE VISIT (OUTPATIENT)
Dept: NEUROLOGY | Age: 62
End: 2022-12-30
Payer: MEDICARE

## 2022-12-30 VITALS
HEIGHT: 73 IN | BODY MASS INDEX: 32.6 KG/M2 | DIASTOLIC BLOOD PRESSURE: 60 MMHG | OXYGEN SATURATION: 96 % | SYSTOLIC BLOOD PRESSURE: 114 MMHG | HEART RATE: 48 BPM | WEIGHT: 246 LBS

## 2022-12-30 DIAGNOSIS — R29.898 LEFT LEG WEAKNESS: ICD-10-CM

## 2022-12-30 DIAGNOSIS — M62.838 MUSCLE SPASMS OF BOTH LOWER EXTREMITIES: ICD-10-CM

## 2022-12-30 DIAGNOSIS — G35 MULTIPLE SCLEROSIS (HCC): Primary | ICD-10-CM

## 2022-12-30 PROCEDURE — 1036F TOBACCO NON-USER: CPT | Performed by: PSYCHIATRY & NEUROLOGY

## 2022-12-30 PROCEDURE — 3017F COLORECTAL CA SCREEN DOC REV: CPT | Performed by: PSYCHIATRY & NEUROLOGY

## 2022-12-30 PROCEDURE — 99213 OFFICE O/P EST LOW 20 MIN: CPT | Performed by: PSYCHIATRY & NEUROLOGY

## 2022-12-30 PROCEDURE — G8417 CALC BMI ABV UP PARAM F/U: HCPCS | Performed by: PSYCHIATRY & NEUROLOGY

## 2022-12-30 PROCEDURE — G8427 DOCREV CUR MEDS BY ELIG CLIN: HCPCS | Performed by: PSYCHIATRY & NEUROLOGY

## 2022-12-30 PROCEDURE — G8484 FLU IMMUNIZE NO ADMIN: HCPCS | Performed by: PSYCHIATRY & NEUROLOGY

## 2022-12-30 NOTE — PATIENT INSTRUCTIONS
Continue with Nancy Polo as scheduled in 2/2023. CBC, Hepatic function panel, 6/2023  Continue Baclofen to 20 mg twice a day. Refills given. Take calcium and Vitamin D daily. Stay physically active   Follow up in 6 months or sooner if needed. Report any new events.  Call if any questions or concerns

## 2022-12-30 NOTE — PROGRESS NOTES
NEUROLOGY OUT PATIENT FOLLOW UP NOTE:  12/30/202210:11 AM    Pal Padilla is here for follow up for multiple sclerosis. Allergies   Allergen Reactions    Pcn [Penicillins]        Current Outpatient Medications:     SYMBICORT 160-4.5 MCG/ACT AERO, Inhale 2 puffs into the lungs 2 times daily, Disp: 3 each, Rfl: 3    baclofen (LIORESAL) 20 MG tablet, Take 1 tablet by mouth 2 times daily, Disp: 60 tablet, Rfl: 8    losartan (COZAAR) 100 MG tablet, Take 100 mg by mouth daily, Disp: , Rfl:     albuterol sulfate HFA (VENTOLIN HFA) 108 (90 Base) MCG/ACT inhaler, Inhale 2 puffs into the lungs 4 times daily as needed for Wheezing, Disp: 18 g, Rfl: 5    modafinil (PROVIGIL) 200 MG tablet, 200 mg daily. , Disp: , Rfl:     Ocrelizumab (OCREVUS IV), Infuse intravenously every 6 months, Disp: , Rfl:     NIFEdipine (ADALAT CC) 30 MG extended release tablet, TAKE 1 TABLET BY MOUTH ONCE DAILY ON AN EMPTY STOMACH, Disp: , Rfl:     hydroCHLOROthiazide (HYDRODIURIL) 25 MG tablet, Take 25 mg by mouth daily, Disp: , Rfl:     isosorbide mononitrate (IMDUR) 30 MG extended release tablet, Take 30 mg by mouth daily, Disp: , Rfl:     OXYGEN, Inhale into the lungs as needed, Disp: , Rfl:     JENTADUETO 2.5-1000 MG TABS, , Disp: , Rfl:     atorvastatin (LIPITOR) 20 MG tablet, Take 20 mg by mouth daily, Disp: , Rfl:     Multiple Vitamin (MULTI VITAMIN DAILY PO), Take by mouth 2 times daily , Disp: , Rfl:     albuterol (PROVENTIL) (2.5 MG/3ML) 0.083% nebulizer solution, Take 2.5 mg by nebulization 4 times daily Patient used 2 times a day rather than 4 times a day., Disp: , Rfl:     TRAMADOL HCL, 50 mg by Does not apply route 3 times daily.  , Disp: , Rfl:     metoprolol (LOPRESSOR) 50 MG tablet, Take 50 mg by mouth 2 times daily  , Disp: , Rfl:     glimepiride (AMARYL) 4 MG tablet, Take 4 mg by mouth 2 times daily , Disp: , Rfl:     aspirin 81 MG tablet, Take 325 mg by mouth daily , Disp: , Rfl:     Umeclidinium Bromide (INCRUSE ELLIPTA) 62.5 MCG/INH AEPB, Inhale 1 puff into the lungs in the morning and at bedtime (Patient not taking: Reported on 12/30/2022), Disp: 90 each, Rfl: 3    I reviewed the past medical history, allergies, medications, social history and family history. PE:   Vitals:    12/30/22 0951   BP: 114/60   Site: Right Upper Arm   Position: Sitting   Cuff Size: Large Adult   Pulse: (!) 48   SpO2: 96%   Weight: 246 lb (111.6 kg)   Height: 6' 1\" (1.854 m)     General Appearance: awake, alert, oriented, in no acute distress, he is wearing a mask. Gen: NAD, Language is Intact. Skin: no rash, lesion, dry to touch. warm  Head: no rash, no icterus  Neck: There is no carotid bruits. The Neck is supple. There is no neck lymphadenopathy. Neuro: CN 2-12 grossly intact with no focal deficits. Power -5/5 left leg power, he has a left ankle brace in place. Otherwise 5/5 in all other extremities. No spasticity noted. Reflexes are symmetric. Long tracts are intact. Cerebellar exam is Intact. Sensory exam is intact to light touch. Gait is limping, uses cane. Musculoskeletal: Has no hand arthritis, no limitation of ROM in any of the four extremities.   Lower extremities no edema          DATA:      Results for orders placed or performed in visit on 12/29/22   CBC   Result Value Ref Range    WBC 11.7 (H) 4.4 - 10.5 th/cmm    RBC 5.07 4.50 - 6.00 mil/cmm    Hemoglobin 15.2 13.5 - 16.5 gm/dL    Hematocrit 44.7 40.0 - 49.0 %    MCV 88.2 80 - 97 CU ODETTE    MCH 30.0 27.5 - 33.0 PG    MCHC 34.0 33.0 - 36.0 gm/dL    RDW 13.4 12.0 - 16.0 %    Platelets 571 816 - 599 th/cmm    Neutrophils % 71.7 (H) 40 - 70 %    Lymphocytes % 15.8 15 - 45 %    Monocytes % 10.4 (H) 2 - 10 %    Eosinophils % 1.5 0 - 6 %    Basophils % 0.6 0 - 2 %    Nucleated RBCs 0.0 <1 /100 WBC    Absolute Neut # 8400 (H) 1800 - 7700 /cmm    Absolute Lymph # 1900 1000 - 4800 /cmm    Absolute Mono # 1200 (H) 0 - 800 /cmm    Absolute Eos # 200 0 - 500 /cmm    Absolute Baso # 100 0 - 200 /cmm Hepatic Function Panel   Result Value Ref Range    AST 13 (L) 15 - 41 IU/L    Alkaline Phosphatase 64 41 - 137 IU/L    Total Bilirubin 0.3 0.2 - 1.0 mg/dL    Bilirubin, Direct <0.1 (L) 0.1 - 0.2 mg/dL    Albumin 4.3 3.5 - 5.0 g/dL    Total Protein 7.3 6.2 - 8.0 g/dL    ALT 15 10 - 40 IU/L     MRI BRAIN W WO CONTRAST    Narrative  PROCEDURE: MRI BRAIN W WO CONTRAST    CLINICAL INFORMATIONMultiple sclerosis ([address] Follow-up. COMPARISON: Brain MRI 10/22/2020. TECHNIQUE: Multiplanar and multiple spin echo T1 and T2-weighted images were obtained through the brain before and after the administration of intravenous contrast. High resolution sagittal FLAIR images were also obtained. FINDINGS:    Current lesion burden of white matter: moderate  Interval since the most recent exam: 8 months. Interval new lesion development: None. Number of pathologically enhancing lesions of the white matter: None. The diffusion-weighted images are normal. The brain volume is mildly reduced. .There are no intra-or extra-axial collections. There is no hydrocephalus, midline shift or mass effect. On the FLAIR and T2-weighted sequences, there is a moderate amount of abnormal signal scattered between the deep and subcortical white matter posterior hemispheres. All of these foci are stable. There is no new white matter signal.    On the gradient echo T2-weighted images, there is there is a stable small focus of susceptibility artifact in the right parietal lobe cortex. This is unchanged. There is no new susceptibility artifact present. There is no abnormal enhancement in the brain. The major intracranial vascular flow voids are present. The midline craniocervical junction structures are normal.  The brainstem and pituitary gland are normal.    There is a stable trace amount of fluid signal in the inferior left mastoid air cells.     There is no significant change in the appearance of the brain compared to the prior study. Impression  1. Moderate amount of abnormal signal in the white matter the brain consistent with multiple sclerosis. 2. No evidence of new white matter lesions. No active demyelination. **This report has been created using voice recognition software. It may contain minor errors which are inherent in voice recognition technology. **      Final report electronically signed by Dr. Asha Hart on 6/19/2021 10:54 AM    MRI Brain: reviewed. Impression    Stable mild to moderate patchy areas of nonspecific T2/FLAIR hyperintensity in the supratentorial white matter. **This report has been created using voice recognition software. It may contain minor errors which are inherent in voice recognition technology. **           Final report electronically signed by Dr. Alexandra Tejeda MD on 8/1/2022 11:22 AM          Assessment:     Diagnosis Orders   1. Multiple sclerosis ([address]        2. Muscle spasms of both lower extremities        3. Left leg weakness             Follow-up for multiple sclerosis, left leg weakness, ataxic gait, muscle spasms in the lower extremities. The patient reports he is doing better since last evaluation he still on baclofen 20 mg twice a day that helps with the spasms significantly. He has been reporting improvement of his symptoms since last evaluation continues to be on Ocrevus infusions his next Ocrevus infusion is due in 2/2023. He uses cane. He denies vision changes. Blood work drawn yesterday, and is pending, had it drawn yesterday. Most recent MRI brain was 8/1/2022 was reviewed and is stable. The patient was counseled about his symptoms, medications. He appears to be improved. He also lost some weight, and feels better with that. After detailed discussion with patient we agreed on the following plan. Plan:  Continue with Román Mercado as scheduled in 2/2023. CBC, Hepatic function panel, 6/2023  Continue Baclofen to 20 mg twice a day. Refills given.    Take calcium and Vitamin D daily. Stay physically active   Follow up in 6 months or sooner if needed. Report any new events.  Call if any questions or concerns     Total time 34 min    Josef Lindo MD

## 2023-01-11 DIAGNOSIS — G35 MULTIPLE SCLEROSIS, RELAPSING-REMITTING (HCC): Primary | Chronic | ICD-10-CM

## 2023-01-11 RX ORDER — SODIUM CHLORIDE 0.9 % (FLUSH) 0.9 %
5-40 SYRINGE (ML) INJECTION PRN
OUTPATIENT
Start: 2023-01-11

## 2023-01-11 RX ORDER — DIPHENHYDRAMINE HYDROCHLORIDE 50 MG/ML
50 INJECTION INTRAMUSCULAR; INTRAVENOUS
OUTPATIENT
Start: 2023-01-11

## 2023-01-11 RX ORDER — FAMOTIDINE 10 MG/ML
20 INJECTION, SOLUTION INTRAVENOUS
OUTPATIENT
Start: 2023-01-11

## 2023-01-11 RX ORDER — SODIUM CHLORIDE 9 MG/ML
5-250 INJECTION, SOLUTION INTRAVENOUS PRN
OUTPATIENT
Start: 2023-01-11

## 2023-01-11 RX ORDER — DIPHENHYDRAMINE HYDROCHLORIDE 50 MG/ML
50 INJECTION INTRAMUSCULAR; INTRAVENOUS ONCE
OUTPATIENT
Start: 2023-01-11 | End: 2023-01-11

## 2023-01-11 RX ORDER — ALBUTEROL SULFATE 90 UG/1
4 AEROSOL, METERED RESPIRATORY (INHALATION) PRN
OUTPATIENT
Start: 2023-01-11

## 2023-01-11 RX ORDER — ACETAMINOPHEN 325 MG/1
650 TABLET ORAL ONCE
OUTPATIENT
Start: 2023-01-11 | End: 2023-01-11

## 2023-01-11 RX ORDER — SODIUM CHLORIDE 9 MG/ML
INJECTION, SOLUTION INTRAVENOUS CONTINUOUS
OUTPATIENT
Start: 2023-01-11

## 2023-01-11 RX ORDER — ONDANSETRON 2 MG/ML
8 INJECTION INTRAMUSCULAR; INTRAVENOUS
OUTPATIENT
Start: 2023-01-11

## 2023-01-11 RX ORDER — HEPARIN SODIUM (PORCINE) LOCK FLUSH IV SOLN 100 UNIT/ML 100 UNIT/ML
500 SOLUTION INTRAVENOUS PRN
OUTPATIENT
Start: 2023-01-11

## 2023-01-11 RX ORDER — EPINEPHRINE 1 MG/ML
0.3 INJECTION, SOLUTION, CONCENTRATE INTRAVENOUS PRN
OUTPATIENT
Start: 2023-01-11

## 2023-01-11 RX ORDER — METHYLPREDNISOLONE SODIUM SUCCINATE 125 MG/2ML
100 INJECTION, POWDER, LYOPHILIZED, FOR SOLUTION INTRAMUSCULAR; INTRAVENOUS ONCE
OUTPATIENT
Start: 2023-01-11

## 2023-01-11 RX ORDER — ACETAMINOPHEN 325 MG/1
650 TABLET ORAL
OUTPATIENT
Start: 2023-01-11

## 2023-01-16 DIAGNOSIS — G35 MULTIPLE SCLEROSIS, RELAPSING-REMITTING (HCC): Primary | ICD-10-CM

## 2023-01-16 DIAGNOSIS — G35 MULTIPLE SCLEROSIS (HCC): ICD-10-CM

## 2023-01-16 RX ORDER — OCRELIZUMAB 300 MG/10ML
600 INJECTION INTRAVENOUS
Qty: 20 ML | Refills: 1 | Status: SHIPPED | OUTPATIENT
Start: 2023-01-16

## 2023-02-16 ENCOUNTER — HOSPITAL ENCOUNTER (OUTPATIENT)
Dept: NURSING | Age: 63
Discharge: HOME | End: 2023-02-16
Payer: MEDICARE

## 2023-02-16 VITALS
WEIGHT: 244 LBS | HEART RATE: 65 BPM | DIASTOLIC BLOOD PRESSURE: 67 MMHG | OXYGEN SATURATION: 93 % | BODY MASS INDEX: 32.19 KG/M2 | RESPIRATION RATE: 18 BRPM | TEMPERATURE: 97.1 F | SYSTOLIC BLOOD PRESSURE: 121 MMHG

## 2023-02-16 DIAGNOSIS — G35 MULTIPLE SCLEROSIS, RELAPSING-REMITTING (HCC): Primary | ICD-10-CM

## 2023-02-16 PROCEDURE — 96365 THER/PROPH/DIAG IV INF INIT: CPT

## 2023-02-16 PROCEDURE — 2580000003 HC RX 258: Performed by: NURSE PRACTITIONER

## 2023-02-16 PROCEDURE — 6370000000 HC RX 637 (ALT 250 FOR IP): Performed by: NURSE PRACTITIONER

## 2023-02-16 PROCEDURE — 96366 THER/PROPH/DIAG IV INF ADDON: CPT

## 2023-02-16 PROCEDURE — 6360000002 HC RX W HCPCS: Performed by: NURSE PRACTITIONER

## 2023-02-16 RX ORDER — ALBUTEROL SULFATE 90 UG/1
4 AEROSOL, METERED RESPIRATORY (INHALATION) PRN
OUTPATIENT
Start: 2023-08-13

## 2023-02-16 RX ORDER — SODIUM CHLORIDE 9 MG/ML
5-250 INJECTION, SOLUTION INTRAVENOUS PRN
OUTPATIENT
Start: 2023-08-13

## 2023-02-16 RX ORDER — ONDANSETRON 2 MG/ML
8 INJECTION INTRAMUSCULAR; INTRAVENOUS
OUTPATIENT
Start: 2023-08-13

## 2023-02-16 RX ORDER — METHYLPREDNISOLONE SODIUM SUCCINATE 125 MG/2ML
100 INJECTION, POWDER, LYOPHILIZED, FOR SOLUTION INTRAMUSCULAR; INTRAVENOUS ONCE
Status: COMPLETED | OUTPATIENT
Start: 2023-02-16 | End: 2023-02-16

## 2023-02-16 RX ORDER — DIPHENHYDRAMINE HYDROCHLORIDE 50 MG/ML
50 INJECTION INTRAMUSCULAR; INTRAVENOUS ONCE
Status: COMPLETED | OUTPATIENT
Start: 2023-02-16 | End: 2023-02-16

## 2023-02-16 RX ORDER — HEPARIN SODIUM (PORCINE) LOCK FLUSH IV SOLN 100 UNIT/ML 100 UNIT/ML
500 SOLUTION INTRAVENOUS PRN
OUTPATIENT
Start: 2023-08-13

## 2023-02-16 RX ORDER — METHYLPREDNISOLONE SODIUM SUCCINATE 125 MG/2ML
100 INJECTION, POWDER, LYOPHILIZED, FOR SOLUTION INTRAMUSCULAR; INTRAVENOUS ONCE
OUTPATIENT
Start: 2023-08-13

## 2023-02-16 RX ORDER — ACETAMINOPHEN 325 MG/1
650 TABLET ORAL ONCE
OUTPATIENT
Start: 2023-08-13 | End: 2023-08-13

## 2023-02-16 RX ORDER — ACETAMINOPHEN 325 MG/1
650 TABLET ORAL ONCE
Status: COMPLETED | OUTPATIENT
Start: 2023-02-16 | End: 2023-02-16

## 2023-02-16 RX ORDER — SODIUM CHLORIDE 0.9 % (FLUSH) 0.9 %
5-40 SYRINGE (ML) INJECTION PRN
OUTPATIENT
Start: 2023-08-13

## 2023-02-16 RX ORDER — ACETAMINOPHEN 325 MG/1
650 TABLET ORAL
OUTPATIENT
Start: 2023-08-13

## 2023-02-16 RX ORDER — SODIUM CHLORIDE 9 MG/ML
INJECTION, SOLUTION INTRAVENOUS CONTINUOUS
OUTPATIENT
Start: 2023-08-13

## 2023-02-16 RX ORDER — DIPHENHYDRAMINE HYDROCHLORIDE 50 MG/ML
50 INJECTION INTRAMUSCULAR; INTRAVENOUS
OUTPATIENT
Start: 2023-08-13

## 2023-02-16 RX ORDER — POTASSIUM CHLORIDE 1.5 G/1.77G
20 POWDER, FOR SOLUTION ORAL DAILY
COMMUNITY

## 2023-02-16 RX ORDER — SODIUM CHLORIDE 9 MG/ML
5-250 INJECTION, SOLUTION INTRAVENOUS PRN
Status: DISCONTINUED | OUTPATIENT
Start: 2023-02-16 | End: 2023-02-17 | Stop reason: HOSPADM

## 2023-02-16 RX ORDER — DIPHENHYDRAMINE HYDROCHLORIDE 50 MG/ML
50 INJECTION INTRAMUSCULAR; INTRAVENOUS ONCE
OUTPATIENT
Start: 2023-08-13 | End: 2023-08-13

## 2023-02-16 RX ADMIN — ACETAMINOPHEN 650 MG: 325 TABLET ORAL at 07:58

## 2023-02-16 RX ADMIN — METHYLPREDNISOLONE SODIUM SUCCINATE 100 MG: 125 INJECTION, POWDER, FOR SOLUTION INTRAMUSCULAR; INTRAVENOUS at 08:05

## 2023-02-16 RX ADMIN — DIPHENHYDRAMINE HYDROCHLORIDE 50 MG: 50 INJECTION, SOLUTION INTRAMUSCULAR; INTRAVENOUS at 08:10

## 2023-02-16 RX ADMIN — SODIUM CHLORIDE 20 ML/HR: 9 INJECTION, SOLUTION INTRAVENOUS at 07:59

## 2023-02-16 RX ADMIN — SODIUM CHLORIDE 600 MG: 0.9 INJECTION, SOLUTION INTRAVENOUS at 08:30

## 2023-02-16 ASSESSMENT — PAIN DESCRIPTION - LOCATION: LOCATION: GENERALIZED

## 2023-02-16 ASSESSMENT — PAIN DESCRIPTION - PAIN TYPE: TYPE: CHRONIC PAIN

## 2023-02-16 ASSESSMENT — PAIN DESCRIPTION - DESCRIPTORS: DESCRIPTORS: ACHING

## 2023-02-16 ASSESSMENT — PAIN SCALES - GENERAL: PAINLEVEL_OUTOF10: 2

## 2023-02-16 NOTE — PROGRESS NOTES
0830: Patient arrived ambulatory with a cane for Ocrevus infusion. Patient denies any antibiotic usage or infection at this time. Patient rights and responsibilities offered to patient. Patient provided with beverage.  Resting in bed, call light in reach.                     _m___ Safety:       (Environmental)  [place] to environment  Ensure ID band is correct and in place/ allergy band as needed  Assess for fall risk  Initiate fall precautions as applicable (fall band, side rails, etc.)  Call light within reach  Bed in low position/ wheels locked    _m___ Pain:       Assess pain level and characteristics  Administer analgesics as ordered  Assess effectiveness of pain management and report to MD as needed    _m___ Knowledge Deficit:  Assess baseline knowledge  Provide teaching at level of understanding  Provide teaching via preferred learning method  Evaluate teaching effectiveness    _m___ Hemodynamic/Respiratory Status:       (Pre and Post Procedure Monitoring)  Assess/Monitor vital signs and LOC  Assess Baseline SpO2 prior to any sedation  Obtain weight/height  Assess vital signs/ LOC until patient meets discharge criteria  Monitor procedure site and notify MD of any issues

## 2023-02-16 NOTE — DISCHARGE INSTRUCTIONS
ACTIVITY:  Continue usual care with your doctor. Call your doctor immediately if any severe problems or go to the nearest emergency room. Please see Ocrevus Medication guide at discharge. Your next Ocrevus infusion is scheduled for Wednesday August 16th at 8:00am.  Please call if you have any questions [phone]    I have been treated and hereby acknowledge receiving this instruction sheet.

## 2023-05-03 DIAGNOSIS — M62.838 MUSCLE SPASMS OF BOTH LOWER EXTREMITIES: ICD-10-CM

## 2023-05-03 RX ORDER — BACLOFEN 20 MG/1
TABLET ORAL
Qty: 60 TABLET | Refills: 5 | Status: SHIPPED | OUTPATIENT
Start: 2023-05-03

## 2023-06-30 ENCOUNTER — OFFICE VISIT (OUTPATIENT)
Dept: NEUROLOGY | Age: 63
End: 2023-06-30
Payer: MEDICARE

## 2023-06-30 VITALS
SYSTOLIC BLOOD PRESSURE: 132 MMHG | BODY MASS INDEX: 30.75 KG/M2 | DIASTOLIC BLOOD PRESSURE: 76 MMHG | WEIGHT: 232 LBS | HEIGHT: 73 IN | HEART RATE: 69 BPM

## 2023-06-30 DIAGNOSIS — G35 MULTIPLE SCLEROSIS (HCC): Primary | ICD-10-CM

## 2023-06-30 DIAGNOSIS — M62.838 MUSCLE SPASMS OF BOTH LOWER EXTREMITIES: ICD-10-CM

## 2023-06-30 PROCEDURE — 1036F TOBACCO NON-USER: CPT | Performed by: NURSE PRACTITIONER

## 2023-06-30 PROCEDURE — 3017F COLORECTAL CA SCREEN DOC REV: CPT | Performed by: NURSE PRACTITIONER

## 2023-06-30 PROCEDURE — G8427 DOCREV CUR MEDS BY ELIG CLIN: HCPCS | Performed by: NURSE PRACTITIONER

## 2023-06-30 PROCEDURE — 99213 OFFICE O/P EST LOW 20 MIN: CPT | Performed by: NURSE PRACTITIONER

## 2023-06-30 PROCEDURE — G8417 CALC BMI ABV UP PARAM F/U: HCPCS | Performed by: NURSE PRACTITIONER

## 2023-08-16 ENCOUNTER — HOSPITAL ENCOUNTER (OUTPATIENT)
Dept: NURSING | Age: 63
Discharge: HOME | End: 2023-08-16
Payer: MEDICARE

## 2023-08-16 VITALS
TEMPERATURE: 97.9 F | HEART RATE: 68 BPM | DIASTOLIC BLOOD PRESSURE: 70 MMHG | BODY MASS INDEX: 30.34 KG/M2 | RESPIRATION RATE: 18 BRPM | WEIGHT: 230 LBS | OXYGEN SATURATION: 97 % | SYSTOLIC BLOOD PRESSURE: 119 MMHG

## 2023-08-16 DIAGNOSIS — G35 MULTIPLE SCLEROSIS, RELAPSING-REMITTING (HCC): Primary | ICD-10-CM

## 2023-08-16 PROCEDURE — 96366 THER/PROPH/DIAG IV INF ADDON: CPT

## 2023-08-16 PROCEDURE — 6360000002 HC RX W HCPCS: Performed by: NURSE PRACTITIONER

## 2023-08-16 PROCEDURE — 2580000003 HC RX 258: Performed by: NURSE PRACTITIONER

## 2023-08-16 PROCEDURE — 6370000000 HC RX 637 (ALT 250 FOR IP): Performed by: NURSE PRACTITIONER

## 2023-08-16 PROCEDURE — 96365 THER/PROPH/DIAG IV INF INIT: CPT

## 2023-08-16 RX ORDER — SODIUM CHLORIDE 0.9 % (FLUSH) 0.9 %
5-40 SYRINGE (ML) INJECTION PRN
OUTPATIENT
Start: 2024-02-11

## 2023-08-16 RX ORDER — DIPHENHYDRAMINE HYDROCHLORIDE 50 MG/ML
50 INJECTION INTRAMUSCULAR; INTRAVENOUS
OUTPATIENT
Start: 2024-02-11

## 2023-08-16 RX ORDER — ALBUTEROL SULFATE 90 UG/1
4 AEROSOL, METERED RESPIRATORY (INHALATION) PRN
OUTPATIENT
Start: 2024-02-11

## 2023-08-16 RX ORDER — ACETAMINOPHEN 325 MG/1
650 TABLET ORAL ONCE
OUTPATIENT
Start: 2024-02-11 | End: 2024-02-11

## 2023-08-16 RX ORDER — ONDANSETRON 2 MG/ML
8 INJECTION INTRAMUSCULAR; INTRAVENOUS
OUTPATIENT
Start: 2024-02-11

## 2023-08-16 RX ORDER — DIPHENHYDRAMINE HYDROCHLORIDE 50 MG/ML
50 INJECTION INTRAMUSCULAR; INTRAVENOUS ONCE
OUTPATIENT
Start: 2024-02-11 | End: 2024-02-11

## 2023-08-16 RX ORDER — SODIUM CHLORIDE 9 MG/ML
5-250 INJECTION, SOLUTION INTRAVENOUS PRN
OUTPATIENT
Start: 2024-02-11

## 2023-08-16 RX ORDER — HEPARIN 100 UNIT/ML
500 SYRINGE INTRAVENOUS PRN
OUTPATIENT
Start: 2024-02-11

## 2023-08-16 RX ORDER — EPINEPHRINE 1 MG/ML
0.3 INJECTION, SOLUTION INTRAMUSCULAR; SUBCUTANEOUS PRN
OUTPATIENT
Start: 2024-02-11

## 2023-08-16 RX ORDER — SODIUM CHLORIDE 9 MG/ML
INJECTION, SOLUTION INTRAVENOUS CONTINUOUS
OUTPATIENT
Start: 2024-02-11

## 2023-08-16 RX ORDER — ACETAMINOPHEN 325 MG/1
650 TABLET ORAL
OUTPATIENT
Start: 2024-02-11

## 2023-08-16 RX ORDER — DIPHENHYDRAMINE HYDROCHLORIDE 50 MG/ML
50 INJECTION INTRAMUSCULAR; INTRAVENOUS ONCE
Status: COMPLETED | OUTPATIENT
Start: 2023-08-16 | End: 2023-08-16

## 2023-08-16 RX ORDER — ACETAMINOPHEN 325 MG/1
650 TABLET ORAL ONCE
Status: COMPLETED | OUTPATIENT
Start: 2023-08-16 | End: 2023-08-16

## 2023-08-16 RX ORDER — SODIUM CHLORIDE 9 MG/ML
5-250 INJECTION, SOLUTION INTRAVENOUS PRN
Status: DISCONTINUED | OUTPATIENT
Start: 2023-08-16 | End: 2023-08-17 | Stop reason: HOSPADM

## 2023-08-16 RX ADMIN — METHYLPREDNISOLONE SODIUM SUCCINATE 100 MG: 125 INJECTION INTRAMUSCULAR; INTRAVENOUS at 09:00

## 2023-08-16 RX ADMIN — DIPHENHYDRAMINE HYDROCHLORIDE 50 MG: 50 INJECTION INTRAMUSCULAR; INTRAVENOUS at 08:27

## 2023-08-16 RX ADMIN — SODIUM CHLORIDE 20 ML/HR: 9 INJECTION, SOLUTION INTRAVENOUS at 08:10

## 2023-08-16 RX ADMIN — ACETAMINOPHEN 650 MG: 325 TABLET ORAL at 08:27

## 2023-08-16 RX ADMIN — OCRELIZUMAB 600 MG: 300 INJECTION INTRAVENOUS at 09:03

## 2023-08-16 ASSESSMENT — PAIN DESCRIPTION - LOCATION: LOCATION: LEG;BACK

## 2023-08-16 ASSESSMENT — PAIN DESCRIPTION - ORIENTATION: ORIENTATION: RIGHT

## 2023-08-16 ASSESSMENT — PAIN DESCRIPTION - DESCRIPTORS: DESCRIPTORS: ACHING

## 2023-08-16 ASSESSMENT — PAIN DESCRIPTION - PAIN TYPE: TYPE: CHRONIC PAIN

## 2023-08-16 ASSESSMENT — PAIN SCALES - GENERAL: PAINLEVEL_OUTOF10: 2

## 2023-08-16 NOTE — PROGRESS NOTES
0800: Patient arrived ambulatory with a cane for Ocrevus infusion. Patient denies any antibiotic usage or infection at this time. Patient rights and responsibilities offered to patient. Patient provided with beverage. 0827: Pre-medications administered. 8532: Ocrevus infusion started, patient tolerating well. 1030: Ocrevus infusion continued. Patient tolerating well. 1110: Ocrevus infusion complete, patient tolerated well. No signs/symptoms of reaction noted. 1205: No signs/symptoms of reaction noted. AVS reviewed with patient, voiced understanding. Patient discharged with cane.                      _m___ Safety:       (Environmental)  [place] to environment  Ensure ID band is correct and in place/ allergy band as needed  Assess for fall risk  Initiate fall precautions as applicable (fall band, side rails, etc.)  Call light within reach  Bed in low position/ wheels locked    _m___ Pain:       Assess pain level and characteristics  Administer analgesics as ordered  Assess effectiveness of pain management and report to MD as needed    _m___ Knowledge Deficit:  Assess baseline knowledge  Provide teaching at level of understanding  Provide teaching via preferred learning method  Evaluate teaching effectiveness    _m___ Hemodynamic/Respiratory Status:       (Pre and Post Procedure Monitoring)  Assess/Monitor vital signs and LOC  Assess Baseline SpO2 prior to any sedation  Obtain weight/height  Assess vital signs/ LOC until patient meets discharge criteria  Monitor procedure site and notify MD of any issues

## 2023-08-16 NOTE — DISCHARGE INSTRUCTIONS
Ocrevus Discharge Instructions    Can not have an active infection or be on an antibiotic at time of infusion. Side effects: headache, fatigue, skin itchiness. Report any unusual diarrhea or severe abdominal pain to your neurologist.     Report any new or worsening neurological signs that have lasted several days such as: problems with thinking, eyesight, strength, and balance. Next Ocrevus infusion: February 16th at 8 AM    Please call [phone] with any questions or concerns.

## 2023-09-18 NOTE — PATIENT INSTRUCTIONS
1. Stop Copaxone to 20 mg subcutaneous daily. 2. Start Ocrevus infusion  3. CBC, Hepatic function panel  4. MRI brain W/WO contrast  5. Continue Baclofen to 20 mg twice a day. Refills given. 6. Take calcium and Vitamin D daily. 7. Stay physically active   8. Follow up in 2 months or sooner if needed. 9. Report any new events.  Call if any questions or concerns Patient Appointment Form:      PCP: Dr Ambika Franks  Referring: Dr Ambika Franks    Has the Patient:    Seen a Cardiologist? yes    date:1yr ago   Physician:Dr Angeles Castano    Had a heart catheterization? no    Had heart surgery? no    Had a stress test or nuclear stress test? no    Had an echocardiogram? yes   date: 2022   facility name:  Dr Lisa Barr    Had a vascular ultrasound? no    Had a 24/48 heart monitor or extended cardiac event monitor? no    Had recent blood work in the last 6 months? yes    date: spring    ordering physician: Dr Ambika Franks    Had a pacemaker/ICD/ILR implant? no    Seen an Electrophysiologist? yes  date: 8/2023   physician: Dr Hermilo Wilder   location: [facility]    Had a cardiac ablation? unknown        Will send records via: in Epic      Date & time of appointment:  10/27/2023 2pm Dr Maureen Rodriguez,

## 2023-10-20 ENCOUNTER — HOSPITAL ENCOUNTER (OUTPATIENT)
Dept: CT IMAGING | Age: 63
Discharge: HOME | End: 2023-10-20
Payer: MEDICARE

## 2023-10-20 DIAGNOSIS — M62.838 MUSCLE SPASMS OF BOTH LOWER EXTREMITIES: ICD-10-CM

## 2023-10-20 DIAGNOSIS — Z87.891 PERSONAL HISTORY OF TOBACCO USE: ICD-10-CM

## 2023-10-20 PROCEDURE — 71271 CT THORAX LUNG CANCER SCR C-: CPT

## 2023-10-20 RX ORDER — BACLOFEN 20 MG/1
TABLET ORAL
Qty: 60 TABLET | Refills: 5 | Status: SHIPPED | OUTPATIENT
Start: 2023-10-20

## 2023-10-30 ENCOUNTER — OFFICE VISIT (OUTPATIENT)
Dept: PULMONOLOGY | Age: 63
End: 2023-10-30
Payer: MEDICARE

## 2023-10-30 VITALS
SYSTOLIC BLOOD PRESSURE: 120 MMHG | HEIGHT: 73 IN | BODY MASS INDEX: 30.22 KG/M2 | TEMPERATURE: 98.9 F | WEIGHT: 228 LBS | OXYGEN SATURATION: 97 % | DIASTOLIC BLOOD PRESSURE: 70 MMHG | HEART RATE: 75 BPM

## 2023-10-30 DIAGNOSIS — J44.9 COPD, SEVERE (HCC): ICD-10-CM

## 2023-10-30 DIAGNOSIS — R91.8 ABNORMAL CT LUNG SCREENING: Primary | ICD-10-CM

## 2023-10-30 DIAGNOSIS — G47.33 OSA (OBSTRUCTIVE SLEEP APNEA): ICD-10-CM

## 2023-10-30 DIAGNOSIS — R91.1 PULMONARY NODULE, RIGHT: ICD-10-CM

## 2023-10-30 DIAGNOSIS — Z87.891 PERSONAL HISTORY OF TOBACCO USE: ICD-10-CM

## 2023-10-30 DIAGNOSIS — G35 MULTIPLE SCLEROSIS, RELAPSING-REMITTING (HCC): ICD-10-CM

## 2023-10-30 PROCEDURE — 1036F TOBACCO NON-USER: CPT | Performed by: NURSE PRACTITIONER

## 2023-10-30 PROCEDURE — 94618 PULMONARY STRESS TESTING: CPT | Performed by: NURSE PRACTITIONER

## 2023-10-30 PROCEDURE — G8427 DOCREV CUR MEDS BY ELIG CLIN: HCPCS | Performed by: NURSE PRACTITIONER

## 2023-10-30 PROCEDURE — 3023F SPIROM DOC REV: CPT | Performed by: NURSE PRACTITIONER

## 2023-10-30 PROCEDURE — 3017F COLORECTAL CA SCREEN DOC REV: CPT | Performed by: NURSE PRACTITIONER

## 2023-10-30 PROCEDURE — G8482 FLU IMMUNIZE ORDER/ADMIN: HCPCS | Performed by: NURSE PRACTITIONER

## 2023-10-30 PROCEDURE — G8417 CALC BMI ABV UP PARAM F/U: HCPCS | Performed by: NURSE PRACTITIONER

## 2023-10-30 PROCEDURE — 99215 OFFICE O/P EST HI 40 MIN: CPT | Performed by: NURSE PRACTITIONER

## 2023-10-30 RX ORDER — BUDESONIDE AND FORMOTEROL FUMARATE DIHYDRATE 160; 4.5 UG/1; UG/1
2 AEROSOL RESPIRATORY (INHALATION) 2 TIMES DAILY
Qty: 3 EACH | Refills: 3 | Status: SHIPPED | OUTPATIENT
Start: 2023-10-30

## 2023-10-30 RX ORDER — UMECLIDINIUM 62.5 UG/1
1 AEROSOL, POWDER ORAL DAILY
Qty: 90 EACH | Refills: 3 | Status: SHIPPED | OUTPATIENT
Start: 2023-10-30

## 2023-10-30 RX ORDER — ALBUTEROL SULFATE 90 UG/1
2 AEROSOL, METERED RESPIRATORY (INHALATION) 4 TIMES DAILY PRN
Qty: 18 G | Refills: 5 | Status: SHIPPED | OUTPATIENT
Start: 2023-10-30

## 2023-10-30 ASSESSMENT — ENCOUNTER SYMPTOMS
SHORTNESS OF BREATH: 1
COUGH: 1

## 2023-10-30 NOTE — PROGRESS NOTES
[] Declined    [x] Completed    CXR:   RADIOLOGY REPORT     CT Chest: reviewed and discussed with patient   CT LUNG SCREENING 10/20/2023    Narrative  NONCONTRAST SCREENING CT CHEST:    HISTORY: History of smoking. 105 pack year history of smoking. TECHNIQUE:  1 mm axial imaging of the chest and upper abdomen without IV contrast. ALL CT SCANS AT THIS FACILITY use dose modulation, iterative reconstruction, and/or weight-based dosing when appropriate to reduce radiation dose to as low as reasonably achievable. All CT scans at this facility use dose modulation, iterative reconstruction, and/or weight based dosing when appropriate to reduce the radiation dose to as low as reasonably achievable. COMPARISON: Screening CT scan of the chest dated 10/20/2022. Jennifer Tomlinson FINDINGS:  LUNG NODULES. There is a 3.4 mm nodule in the right lower lobe posteriorly seen on image 292, not clearly seen on previous study. LYMPHADENOPATHY. There are no pathologically enlarged lymph nodes present    OTHER: . The patient is status post coronary bypass surgery. There are emphysematous changes throughout both lung fields. Impression  1. There is a 3.4 mm nodule in the right lower lobe posteriorly seen on image 292 not clearly seen on previous study dated 20 oh October 2022.  2. There are no pathologically enlarged lymph nodes. 3. LUNGRADS ASSESSMENT VALUE: 3,    The LUNG RADS RECOMMENDATIONS for monitoring lung nodules listed below (ACR- Lung-RADS Version 1.0 Assessment Categories)    LUNG RADS RECOMMENDATIONS;  1.  Normal, continue annual screening  2. Benign appearance or behavior, continue annual screening  3.  6 month CT recommended  4A.  3 month CT recommended; may consider PET/CT  4B. Additional diagnostics and/or tissue sampling recommended  4X. Additional diagnostics and/or tissue sampling recommended    **This report has been created using voice recognition software.   It may contain minor errors which are inherent in

## 2024-01-04 DIAGNOSIS — G35 MULTIPLE SCLEROSIS (HCC): ICD-10-CM

## 2024-01-04 DIAGNOSIS — G35 MULTIPLE SCLEROSIS, RELAPSING-REMITTING (HCC): ICD-10-CM

## 2024-01-04 DIAGNOSIS — G35 MULTIPLE SCLEROSIS, RELAPSING-REMITTING (HCC): Primary | ICD-10-CM

## 2024-01-04 RX ORDER — ACETAMINOPHEN 325 MG/1
650 TABLET ORAL ONCE
OUTPATIENT
Start: 2024-01-04 | End: 2024-01-04

## 2024-01-04 RX ORDER — HEPARIN SODIUM (PORCINE) LOCK FLUSH IV SOLN 100 UNIT/ML 100 UNIT/ML
500 SOLUTION INTRAVENOUS PRN
OUTPATIENT
Start: 2024-01-04

## 2024-01-04 RX ORDER — ACETAMINOPHEN 325 MG/1
650 TABLET ORAL
OUTPATIENT
Start: 2024-01-04

## 2024-01-04 RX ORDER — SODIUM CHLORIDE 9 MG/ML
5-250 INJECTION, SOLUTION INTRAVENOUS PRN
OUTPATIENT
Start: 2024-01-04

## 2024-01-04 RX ORDER — OCRELIZUMAB 300 MG/10ML
600 INJECTION INTRAVENOUS
Qty: 20 ML | Refills: 1 | Status: ACTIVE | OUTPATIENT
Start: 2024-01-04 | End: 2024-01-05 | Stop reason: SDUPTHER

## 2024-01-04 RX ORDER — FAMOTIDINE 10 MG/ML
20 INJECTION, SOLUTION INTRAVENOUS
OUTPATIENT
Start: 2024-01-04

## 2024-01-04 RX ORDER — EPINEPHRINE 1 MG/ML
0.3 INJECTION, SOLUTION, CONCENTRATE INTRAVENOUS PRN
OUTPATIENT
Start: 2024-01-04

## 2024-01-04 RX ORDER — ALBUTEROL SULFATE 90 UG/1
4 AEROSOL, METERED RESPIRATORY (INHALATION) PRN
OUTPATIENT
Start: 2024-01-04

## 2024-01-04 RX ORDER — DIPHENHYDRAMINE HYDROCHLORIDE 50 MG/ML
50 INJECTION INTRAMUSCULAR; INTRAVENOUS
OUTPATIENT
Start: 2024-01-04

## 2024-01-04 RX ORDER — DIPHENHYDRAMINE HYDROCHLORIDE 50 MG/ML
50 INJECTION INTRAMUSCULAR; INTRAVENOUS ONCE
OUTPATIENT
Start: 2024-01-04 | End: 2024-01-04

## 2024-01-04 RX ORDER — SODIUM CHLORIDE 9 MG/ML
INJECTION, SOLUTION INTRAVENOUS CONTINUOUS
OUTPATIENT
Start: 2024-01-04

## 2024-01-04 RX ORDER — ONDANSETRON 2 MG/ML
8 INJECTION INTRAMUSCULAR; INTRAVENOUS
OUTPATIENT
Start: 2024-01-04

## 2024-01-04 RX ORDER — SODIUM CHLORIDE 0.9 % (FLUSH) 0.9 %
5-40 SYRINGE (ML) INJECTION PRN
OUTPATIENT
Start: 2024-01-04

## 2024-01-05 DIAGNOSIS — G35 MULTIPLE SCLEROSIS (HCC): ICD-10-CM

## 2024-01-05 DIAGNOSIS — G35 MULTIPLE SCLEROSIS, RELAPSING-REMITTING (HCC): ICD-10-CM

## 2024-01-05 RX ORDER — OCRELIZUMAB 300 MG/10ML
600 INJECTION INTRAVENOUS
Qty: 20 ML | Refills: 1 | Status: ACTIVE | OUTPATIENT
Start: 2024-01-05

## 2024-01-05 NOTE — TELEPHONE ENCOUNTER
Previous script failed to send to pharmacy. [place] harness team requesting script be resent so they can route it to [facility] for patient assistance.

## 2024-01-16 DIAGNOSIS — G35 MULTIPLE SCLEROSIS, RELAPSING-REMITTING (HCC): Primary | ICD-10-CM

## 2024-01-16 RX ORDER — SODIUM CHLORIDE 9 MG/ML
INJECTION, SOLUTION INTRAVENOUS CONTINUOUS
OUTPATIENT
Start: 2024-01-16

## 2024-01-16 RX ORDER — HEPARIN SODIUM (PORCINE) LOCK FLUSH IV SOLN 100 UNIT/ML 100 UNIT/ML
500 SOLUTION INTRAVENOUS PRN
OUTPATIENT
Start: 2024-01-16

## 2024-01-16 RX ORDER — DIPHENHYDRAMINE HYDROCHLORIDE 50 MG/ML
50 INJECTION INTRAMUSCULAR; INTRAVENOUS ONCE
OUTPATIENT
Start: 2024-01-16 | End: 2024-01-16

## 2024-01-16 RX ORDER — EPINEPHRINE 1 MG/ML
0.3 INJECTION, SOLUTION, CONCENTRATE INTRAVENOUS PRN
OUTPATIENT
Start: 2024-01-16

## 2024-01-16 RX ORDER — SODIUM CHLORIDE 0.9 % (FLUSH) 0.9 %
5-40 SYRINGE (ML) INJECTION PRN
OUTPATIENT
Start: 2024-01-16

## 2024-01-16 RX ORDER — ONDANSETRON 2 MG/ML
8 INJECTION INTRAMUSCULAR; INTRAVENOUS
OUTPATIENT
Start: 2024-01-16

## 2024-01-16 RX ORDER — SODIUM CHLORIDE 9 MG/ML
5-250 INJECTION, SOLUTION INTRAVENOUS PRN
OUTPATIENT
Start: 2024-01-16

## 2024-01-16 RX ORDER — ACETAMINOPHEN 325 MG/1
650 TABLET ORAL
OUTPATIENT
Start: 2024-01-16

## 2024-01-16 RX ORDER — DIPHENHYDRAMINE HYDROCHLORIDE 50 MG/ML
50 INJECTION INTRAMUSCULAR; INTRAVENOUS
OUTPATIENT
Start: 2024-01-16

## 2024-01-16 RX ORDER — ACETAMINOPHEN 325 MG/1
650 TABLET ORAL ONCE
OUTPATIENT
Start: 2024-01-16 | End: 2024-01-16

## 2024-01-16 RX ORDER — ALBUTEROL SULFATE 90 UG/1
4 AEROSOL, METERED RESPIRATORY (INHALATION) PRN
OUTPATIENT
Start: 2024-01-16

## 2024-01-16 RX ORDER — FAMOTIDINE 10 MG/ML
20 INJECTION, SOLUTION INTRAVENOUS
OUTPATIENT
Start: 2024-01-16

## 2024-02-16 ENCOUNTER — HOSPITAL ENCOUNTER (OUTPATIENT)
Dept: NURSING | Age: 64
Discharge: HOME | End: 2024-02-16
Payer: MEDICARE

## 2024-02-16 VITALS
BODY MASS INDEX: 31.66 KG/M2 | TEMPERATURE: 98.2 F | RESPIRATION RATE: 18 BRPM | SYSTOLIC BLOOD PRESSURE: 139 MMHG | DIASTOLIC BLOOD PRESSURE: 80 MMHG | OXYGEN SATURATION: 95 % | HEART RATE: 70 BPM | WEIGHT: 240 LBS

## 2024-02-16 DIAGNOSIS — G35 MULTIPLE SCLEROSIS, RELAPSING-REMITTING (HCC): Primary | ICD-10-CM

## 2024-02-16 PROCEDURE — 2580000003 HC RX 258: Performed by: NURSE PRACTITIONER

## 2024-02-16 PROCEDURE — 96366 THER/PROPH/DIAG IV INF ADDON: CPT

## 2024-02-16 PROCEDURE — 6370000000 HC RX 637 (ALT 250 FOR IP): Performed by: NURSE PRACTITIONER

## 2024-02-16 PROCEDURE — 96365 THER/PROPH/DIAG IV INF INIT: CPT

## 2024-02-16 PROCEDURE — 6360000002 HC RX W HCPCS: Performed by: NURSE PRACTITIONER

## 2024-02-16 RX ORDER — ACETAMINOPHEN 325 MG/1
650 TABLET ORAL ONCE
Status: COMPLETED | OUTPATIENT
Start: 2024-02-16 | End: 2024-02-16

## 2024-02-16 RX ORDER — SODIUM CHLORIDE 0.9 % (FLUSH) 0.9 %
5-40 SYRINGE (ML) INJECTION PRN
OUTPATIENT
Start: 2024-08-11

## 2024-02-16 RX ORDER — DIPHENHYDRAMINE HYDROCHLORIDE 50 MG/ML
50 INJECTION INTRAMUSCULAR; INTRAVENOUS ONCE
OUTPATIENT
Start: 2024-08-11 | End: 2024-08-11

## 2024-02-16 RX ORDER — ALBUTEROL SULFATE 90 UG/1
4 AEROSOL, METERED RESPIRATORY (INHALATION) PRN
OUTPATIENT
Start: 2024-08-11

## 2024-02-16 RX ORDER — EPINEPHRINE 1 MG/ML
0.3 INJECTION, SOLUTION INTRAMUSCULAR; SUBCUTANEOUS PRN
OUTPATIENT
Start: 2024-08-11

## 2024-02-16 RX ORDER — HEPARIN 100 UNIT/ML
500 SYRINGE INTRAVENOUS PRN
OUTPATIENT
Start: 2024-08-11

## 2024-02-16 RX ORDER — ACETAMINOPHEN 325 MG/1
650 TABLET ORAL
OUTPATIENT
Start: 2024-08-11

## 2024-02-16 RX ORDER — DIPHENHYDRAMINE HYDROCHLORIDE 50 MG/ML
50 INJECTION INTRAMUSCULAR; INTRAVENOUS
OUTPATIENT
Start: 2024-08-11

## 2024-02-16 RX ORDER — SODIUM CHLORIDE 9 MG/ML
INJECTION, SOLUTION INTRAVENOUS CONTINUOUS
OUTPATIENT
Start: 2024-08-11

## 2024-02-16 RX ORDER — DIPHENHYDRAMINE HYDROCHLORIDE 50 MG/ML
50 INJECTION INTRAMUSCULAR; INTRAVENOUS ONCE
Status: COMPLETED | OUTPATIENT
Start: 2024-02-16 | End: 2024-02-16

## 2024-02-16 RX ORDER — ACETAMINOPHEN 325 MG/1
650 TABLET ORAL ONCE
OUTPATIENT
Start: 2024-08-11 | End: 2024-08-11

## 2024-02-16 RX ORDER — SODIUM CHLORIDE 9 MG/ML
5-250 INJECTION, SOLUTION INTRAVENOUS PRN
OUTPATIENT
Start: 2024-08-11

## 2024-02-16 RX ORDER — ONDANSETRON 2 MG/ML
8 INJECTION INTRAMUSCULAR; INTRAVENOUS
OUTPATIENT
Start: 2024-08-11

## 2024-02-16 RX ORDER — SODIUM CHLORIDE 9 MG/ML
5-250 INJECTION, SOLUTION INTRAVENOUS PRN
Status: DISCONTINUED | OUTPATIENT
Start: 2024-02-16 | End: 2024-02-17 | Stop reason: HOSPADM

## 2024-02-16 RX ADMIN — SODIUM CHLORIDE 20 ML/HR: 9 INJECTION, SOLUTION INTRAVENOUS at 08:15

## 2024-02-16 RX ADMIN — OCRELIZUMAB 600 MG: 300 INJECTION INTRAVENOUS at 09:01

## 2024-02-16 RX ADMIN — DIPHENHYDRAMINE HYDROCHLORIDE 50 MG: 50 INJECTION INTRAMUSCULAR; INTRAVENOUS at 08:11

## 2024-02-16 RX ADMIN — METHYLPREDNISOLONE SODIUM SUCCINATE 125 MG: 125 INJECTION, POWDER, FOR SOLUTION INTRAMUSCULAR; INTRAVENOUS at 08:12

## 2024-02-16 RX ADMIN — ACETAMINOPHEN 650 MG: 325 TABLET ORAL at 08:11

## 2024-02-16 ASSESSMENT — PAIN DESCRIPTION - DESCRIPTORS: DESCRIPTORS: ACHING

## 2024-02-16 ASSESSMENT — PAIN - FUNCTIONAL ASSESSMENT: PAIN_FUNCTIONAL_ASSESSMENT: 0-10

## 2024-02-16 NOTE — DISCHARGE INSTRUCTIONS
Next appointment is scheduled August 16 at 8:00    Ocrevus Discharge Instructions    Can not have an active infection or be on an antibiotic at time of infusion.     Side effects: headache, fatigue, skin itchiness.     Report any unusual diarrhea or severe abdominal pain to your neurologist.     Report any new or worsening neurological signs that have lasted several days such as: problems with thinking, eyesight, strength, and balance.     Please call [phone] with any questions or concerns.

## 2024-02-16 NOTE — PROGRESS NOTES
0746 Patient arrived to \[facility]\"" ambulatory for ocrevus infusion.  Oriented to room and call light  PT RIGHTS AND RESPONSIBILITIES OFFERED TO PT.  He denies recent infection or antibiotic use     0812 Pre-medications given and he denies complaints    0901 Ocrevus started and he denies complaints    0916 Medication infusing and he denies complaints    0931 Medication infusing and he denies complaints    1001 Medication infusing and he denies complaints      1112 Medication completed and he denies complaints.      1212 Patient denies complaints.  Iv removed.  Discharge instructions given and explained and he denies questions.  Discharged ambulatory     _M___ Safety:       (Environmental)  [place] to environment  Ensure ID band is correct and in place/ allergy band as needed  Assess for fall risk  Initiate fall precautions as applicable (fall band, side rails, etc.)  Call light within reach  Bed in low position/ wheels locked    M____ Pain:       Assess pain level and characteristics  Administer analgesics as ordered  Assess effectiveness of pain management and report to MD as needed    _M___ Knowledge Deficit:  Assess baseline knowledge  Provide teaching at level of understanding  Provide teaching via preferred learning method  Evaluate teaching effectiveness    M____ Hemodynamic/Respiratory Status:       (Pre and Post Procedure Monitoring)  Assess/Monitor vital signs and LOC  Assess Baseline SpO2 prior to any sedation  Obtain weight/height  Assess vital signs/ LOC until patient meets discharge criteria  Monitor procedure site and notify MD of any issues

## 2024-04-23 ENCOUNTER — TELEPHONE (OUTPATIENT)
Dept: PULMONOLOGY | Age: 64
End: 2024-04-23

## 2024-04-23 DIAGNOSIS — J44.9 COPD, SEVERE (HCC): Primary | ICD-10-CM

## 2024-04-23 NOTE — TELEPHONE ENCOUNTER
Symbicort no longer covered by insurance . They prefer Advair, HFA , Breo Ellipta or Dulera. Please advise, Thank you

## 2024-04-28 DIAGNOSIS — M62.838 MUSCLE SPASMS OF BOTH LOWER EXTREMITIES: ICD-10-CM

## 2024-04-29 RX ORDER — BACLOFEN 20 MG/1
TABLET ORAL
Qty: 60 TABLET | Refills: 0 | Status: SHIPPED | OUTPATIENT
Start: 2024-04-29

## 2024-04-30 ENCOUNTER — HOSPITAL ENCOUNTER (OUTPATIENT)
Dept: CT IMAGING | Age: 64
Discharge: HOME | End: 2024-04-30
Payer: MEDICARE

## 2024-04-30 ENCOUNTER — HOSPITAL ENCOUNTER (OUTPATIENT)
Dept: PULMONOLOGY | Age: 64
Discharge: HOME | End: 2024-04-30
Payer: MEDICARE

## 2024-04-30 DIAGNOSIS — R91.8 ABNORMAL CT LUNG SCREENING: ICD-10-CM

## 2024-04-30 DIAGNOSIS — Z87.891 PERSONAL HISTORY OF TOBACCO USE: ICD-10-CM

## 2024-04-30 DIAGNOSIS — J44.9 COPD, SEVERE (HCC): ICD-10-CM

## 2024-04-30 DIAGNOSIS — R91.1 PULMONARY NODULE, RIGHT: ICD-10-CM

## 2024-04-30 PROCEDURE — 94010 BREATHING CAPACITY TEST: CPT

## 2024-04-30 PROCEDURE — 71250 CT THORAX DX C-: CPT

## 2024-05-10 ENCOUNTER — OFFICE VISIT (OUTPATIENT)
Dept: PULMONOLOGY | Age: 64
End: 2024-05-10

## 2024-05-10 VITALS
SYSTOLIC BLOOD PRESSURE: 134 MMHG | HEIGHT: 73 IN | DIASTOLIC BLOOD PRESSURE: 68 MMHG | BODY MASS INDEX: 30.75 KG/M2 | OXYGEN SATURATION: 96 % | WEIGHT: 232 LBS | TEMPERATURE: 98.2 F | HEART RATE: 61 BPM

## 2024-05-10 DIAGNOSIS — R91.1 PULMONARY NODULE, RIGHT: ICD-10-CM

## 2024-05-10 DIAGNOSIS — J44.9 STAGE 2 MODERATE COPD BY GOLD CLASSIFICATION (HCC): Primary | ICD-10-CM

## 2024-05-10 DIAGNOSIS — Z87.891 PERSONAL HISTORY OF TOBACCO USE: ICD-10-CM

## 2024-05-10 PROBLEM — I10 PRIMARY HYPERTENSION: Status: ACTIVE | Noted: 2024-05-10

## 2024-05-10 PROBLEM — E78.5 HYPERLIPIDEMIA: Status: ACTIVE | Noted: 2024-05-10

## 2024-05-10 PROBLEM — E11.9 TYPE 2 DIABETES MELLITUS (HCC): Status: ACTIVE | Noted: 2024-05-10

## 2024-05-10 RX ORDER — UMECLIDINIUM 62.5 UG/1
1 AEROSOL, POWDER ORAL DAILY
Qty: 90 EACH | Refills: 3 | Status: SHIPPED | OUTPATIENT
Start: 2024-05-10

## 2024-05-10 RX ORDER — BUDESONIDE AND FORMOTEROL FUMARATE DIHYDRATE 160; 4.5 UG/1; UG/1
2 AEROSOL RESPIRATORY (INHALATION) 2 TIMES DAILY
COMMUNITY

## 2024-05-10 ASSESSMENT — ENCOUNTER SYMPTOMS
VOMITING: 0
NAUSEA: 0
COUGH: 1
CHEST TIGHTNESS: 0
ABDOMINAL PAIN: 0
SHORTNESS OF BREATH: 1
EYES NEGATIVE: 1
WHEEZING: 0
DIARRHEA: 0

## 2024-05-10 NOTE — PROGRESS NOTES
[facility]     Patient: ARACELI HAINES, 63 y.o.   : 1960  5/10/2024    Pt of mine      Subjective     Chief Complaint   Patient presents with    Follow-up     6 month COPD follow up with CT & zohra 24.         HPI       Patient presents for 6 months COPD  follow up   Uses his IS daily at home, has been able to get ball all the way to the top   Previously on oxygen. Discontinued in Oct 2023- he monitors at home, always in 90's   Feeling good.   No flare ups   SOB only with exertion . Occ. Cough- stable  No wheeze  Using inhalers compliantly : Incruse, Dulera, Albuterol ( using 2x per week)      Immunization History   Administered Date(s) Administered    COVID-19, [place] Vaccine, Vaccine Unspecified 2021, 2021    Influenza Virus Vaccine 10/11/2018, 10/17/2019, 10/13/2022    Influenza, FLUARIX, FLULAVAL, FLUZONE (age 6 mo+) AND AFLURIA, (age 3 y+), PF, 0.5mL 10/17/2023    Pneumococcal, PCV20, PREVNAR 20, (age 6w+), IM, 0.5mL 10/17/2023       SOCIAL HISTORY:  Social History     Tobacco Use    Smoking status: Former     Current packs/day: 0.00     Average packs/day: 3.0 packs/day for 35.0 years (105.0 ttl pk-yrs)     Types: Cigarettes     Start date: 1973     Quit date: 2008     Years since quitting: 15.6    Smokeless tobacco: Never   Vaping Use    Vaping Use: Never used   Substance Use Topics    Alcohol use: Not Currently     Comment: occas.    Drug use: No     Comment: younger years       CURRENT MEDICATIONS:  Current Outpatient Medications   Medication Sig Dispense Refill    budesonide-formoterol (SYMBICORT) 160-4.5 MCG/ACT AERO Inhale 2 puffs into the lungs 2 times daily      umeclidinium bromide (INCRUSE ELLIPTA) 62.5 MCG/ACT inhaler Inhale 1 puff into the lungs daily 90 each 3    baclofen (LIORESAL) 20 MG tablet Take 1 tablet by mouth twice daily 60 tablet 0    mometasone-formoterol (DULERA) 200-5 MCG/ACT inhaler Inhale 2 puffs into the lungs in

## 2024-05-28 ENCOUNTER — TELEPHONE (OUTPATIENT)
Dept: UROLOGY | Age: 64
End: 2024-05-28

## 2024-05-28 ENCOUNTER — HOSPITAL ENCOUNTER (EMERGENCY)
Age: 64
Discharge: HOME | End: 2024-05-28
Attending: EMERGENCY MEDICINE
Payer: MEDICARE

## 2024-05-28 ENCOUNTER — HOSPITAL ENCOUNTER (OUTPATIENT)
Dept: CT IMAGING | Age: 64
Discharge: HOME | End: 2024-05-28
Attending: FAMILY MEDICINE
Payer: MEDICARE

## 2024-05-28 VITALS
OXYGEN SATURATION: 98 % | HEART RATE: 76 BPM | TEMPERATURE: 98.4 F | SYSTOLIC BLOOD PRESSURE: 154 MMHG | RESPIRATION RATE: 18 BRPM | WEIGHT: 230 LBS | BODY MASS INDEX: 30.76 KG/M2 | DIASTOLIC BLOOD PRESSURE: 88 MMHG

## 2024-05-28 DIAGNOSIS — N20.0 KIDNEY STONE: Primary | ICD-10-CM

## 2024-05-28 DIAGNOSIS — M54.42 ACUTE BACK PAIN WITH SCIATICA, LEFT: ICD-10-CM

## 2024-05-28 DIAGNOSIS — N13.30 HYDRONEPHROSIS, UNSPECIFIED HYDRONEPHROSIS TYPE: ICD-10-CM

## 2024-05-28 DIAGNOSIS — D17.1 LIPOMA OF TORSO: ICD-10-CM

## 2024-05-28 DIAGNOSIS — N20.0 KIDNEY STONE: ICD-10-CM

## 2024-05-28 LAB
ANION GAP SERPL CALC-SCNC: 10 MEQ/L (ref 8–16)
BACTERIA URNS QL MICRO: ABNORMAL /HPF
BASOPHILS ABSOLUTE: 0.1 THOU/MM3 (ref 0–0.1)
BASOPHILS NFR BLD AUTO: 0.8 %
BILIRUB UR QL STRIP.AUTO: NEGATIVE
BUN SERPL-MCNC: 17 MG/DL (ref 7–22)
CALCIUM SERPL-MCNC: 9.1 MG/DL (ref 8.5–10.5)
CASTS #/AREA URNS LPF: ABNORMAL /LPF
CASTS 2: ABNORMAL /LPF
CHARACTER UR: CLEAR
CHLORIDE SERPL-SCNC: 105 MEQ/L (ref 98–111)
CO2 SERPL-SCNC: 24 MEQ/L (ref 23–33)
COLOR: YELLOW
CREAT SERPL-MCNC: 0.6 MG/DL (ref 0.4–1.2)
CRYSTALS URNS MICRO: ABNORMAL
DEPRECATED RDW RBC AUTO: 44.5 FL (ref 35–45)
EOSINOPHIL NFR BLD AUTO: 1.8 %
EOSINOPHILS ABSOLUTE: 0.2 THOU/MM3 (ref 0–0.4)
EPITHELIAL CELLS, UA: ABNORMAL /HPF
ERYTHROCYTE [DISTWIDTH] IN BLOOD BY AUTOMATED COUNT: 13.2 % (ref 11.5–14.5)
GFR SERPL CREATININE-BSD FRML MDRD: > 90 ML/MIN/1.73M2
GLUCOSE SERPL-MCNC: 101 MG/DL (ref 70–108)
GLUCOSE UR QL STRIP.AUTO: NEGATIVE MG/DL
HCT VFR BLD AUTO: 46.7 % (ref 42–52)
HGB BLD-MCNC: 15.4 GM/DL (ref 14–18)
HGB UR QL STRIP.AUTO: NEGATIVE
IMM GRANULOCYTES # BLD AUTO: 0.03 THOU/MM3 (ref 0–0.07)
IMM GRANULOCYTES NFR BLD AUTO: 0.3 %
KETONES UR QL STRIP.AUTO: NEGATIVE
LYMPHOCYTES ABSOLUTE: 1.4 THOU/MM3 (ref 1–4.8)
LYMPHOCYTES NFR BLD AUTO: 11.6 %
MCH RBC QN AUTO: 30 PG (ref 26–33)
MCHC RBC AUTO-ENTMCNC: 33 GM/DL (ref 32.2–35.5)
MCV RBC AUTO: 90.9 FL (ref 80–94)
MISCELLANEOUS 2: ABNORMAL
MONOCYTES ABSOLUTE: 1.2 THOU/MM3 (ref 0.4–1.3)
MONOCYTES NFR BLD AUTO: 9.9 %
NEUTROPHILS ABSOLUTE: 9.1 THOU/MM3 (ref 1.8–7.7)
NEUTROPHILS NFR BLD AUTO: 75.6 %
NITRITE UR QL STRIP: NEGATIVE
NRBC BLD AUTO-RTO: 0 /100 WBC
OSMOLALITY SERPL CALC.SUM OF ELEC: 279.2 MOSMOL/KG (ref 275–300)
PH UR STRIP.AUTO: 6 [PH] (ref 5–9)
PLATELET # BLD AUTO: 324 THOU/MM3 (ref 130–400)
PMV BLD AUTO: 9.3 FL (ref 9.4–12.4)
POTASSIUM SERPL-SCNC: 3.9 MEQ/L (ref 3.5–5.2)
PROT UR STRIP.AUTO-MCNC: NEGATIVE MG/DL
RBC # BLD AUTO: 5.14 MILL/MM3 (ref 4.7–6.1)
RBC URINE: ABNORMAL /HPF
RENAL EPI CELLS #/AREA URNS HPF: ABNORMAL /[HPF]
SODIUM SERPL-SCNC: 139 MEQ/L (ref 135–145)
SP GR UR REFRACT.AUTO: 1.02 (ref 1–1.03)
UROBILINOGEN, URINE: 0.2 EU/DL (ref 0–1)
WBC # BLD AUTO: 12 THOU/MM3 (ref 4.8–10.8)
WBC #/AREA URNS HPF: ABNORMAL /HPF
WBC #/AREA URNS HPF: ABNORMAL /[HPF]
YEAST LIKE FUNGI URNS QL MICRO: ABNORMAL

## 2024-05-28 PROCEDURE — 85025 COMPLETE CBC W/AUTO DIFF WBC: CPT

## 2024-05-28 PROCEDURE — 74176 CT ABD & PELVIS W/O CONTRAST: CPT

## 2024-05-28 PROCEDURE — 87086 URINE CULTURE/COLONY COUNT: CPT

## 2024-05-28 PROCEDURE — 80048 BASIC METABOLIC PNL TOTAL CA: CPT

## 2024-05-28 PROCEDURE — 99284 EMERGENCY DEPT VISIT MOD MDM: CPT

## 2024-05-28 PROCEDURE — 81001 URINALYSIS AUTO W/SCOPE: CPT

## 2024-05-28 PROCEDURE — 96374 THER/PROPH/DIAG INJ IV PUSH: CPT

## 2024-05-28 PROCEDURE — 36415 COLL VENOUS BLD VENIPUNCTURE: CPT

## 2024-05-28 PROCEDURE — 6360000002 HC RX W HCPCS: Performed by: PHYSICIAN ASSISTANT

## 2024-05-28 RX ORDER — TAMSULOSIN HYDROCHLORIDE 0.4 MG/1
0.4 CAPSULE ORAL DAILY
Qty: 5 CAPSULE | Refills: 0 | Status: SHIPPED | OUTPATIENT
Start: 2024-05-28 | End: 2024-05-29

## 2024-05-28 RX ORDER — ONDANSETRON 4 MG/1
4 TABLET, FILM COATED ORAL EVERY 8 HOURS PRN
Qty: 20 TABLET | Refills: 0 | Status: SHIPPED | OUTPATIENT
Start: 2024-05-28 | End: 2024-06-17

## 2024-05-28 RX ORDER — KETOROLAC TROMETHAMINE 30 MG/ML
15 INJECTION, SOLUTION INTRAMUSCULAR; INTRAVENOUS ONCE
Status: COMPLETED | OUTPATIENT
Start: 2024-05-28 | End: 2024-05-28

## 2024-05-28 RX ORDER — KETOROLAC TROMETHAMINE 10 MG/1
10 TABLET, FILM COATED ORAL EVERY 6 HOURS PRN
Qty: 20 TABLET | Refills: 0 | Status: SHIPPED | OUTPATIENT
Start: 2024-05-28

## 2024-05-28 RX ORDER — LEVOFLOXACIN 500 MG/1
500 TABLET, FILM COATED ORAL DAILY
Qty: 10 TABLET | Refills: 0 | Status: SHIPPED | OUTPATIENT
Start: 2024-05-28 | End: 2024-06-07

## 2024-05-28 RX ADMIN — KETOROLAC TROMETHAMINE 15 MG: 30 INJECTION, SOLUTION INTRAMUSCULAR at 17:02

## 2024-05-28 ASSESSMENT — PAIN DESCRIPTION - LOCATION: LOCATION: FLANK

## 2024-05-28 ASSESSMENT — PAIN SCALES - GENERAL: PAINLEVEL_OUTOF10: 3

## 2024-05-28 ASSESSMENT — PAIN - FUNCTIONAL ASSESSMENT: PAIN_FUNCTIONAL_ASSESSMENT: 0-10

## 2024-05-28 ASSESSMENT — PAIN DESCRIPTION - ORIENTATION: ORIENTATION: RIGHT;LOWER

## 2024-05-28 NOTE — ED TRIAGE NOTES
Patient to ED from home with report of right sided flank pain the past 9 months. Patient states he feels like he has something under his skin on his left flank. Pt has a diagnosed kidney stone as of 3-4 weeks ago. Patient is A/O x4 on arrival. Respirations even and unlabored.

## 2024-05-28 NOTE — ED PROVIDER NOTES
[facility] EMERGENCY DEPT      EMERGENCY MEDICINE     Pt Name: Gaston Peña  MRN: [id number]  Birthdate 1960  Date of evaluation: 5/28/2024  Provider: Rasta Herbert PA-C    CHIEF COMPLAINT       Chief Complaint   Patient presents with    Flank Pain     HISTORY OF PRESENT ILLNESS   Gaston Peña is a pleasant 63 y.o. male who presents to the emergency department from home with right flank pain ongoing for 7 months.  Patient states sharp in nature constant is made worse better moderate severity.  Patient been treated for concern for back strain.  Patient also noted a lump in his lower back that has been painful over the last several weeks.  No injury.  Patient currently taking tramadol with some relief.  No nausea vomiting chest pain shortness of breath fevers or chills.  No other complaints.    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    Chronic fatigue     COPD (chronic obstructive pulmonary disease) ([facility])     Demyelinating disease of central nervous system ([facility])     Depression     Essential hypertension     Hyperlipidemia     Hypertension     Hypoxia     Multiple sclerosis ([facility]) 2012    RODRIGUE (obstructive sleep apnea)     Post herpetic neuralgia     Reactive airway disease     unspecified asthma severity    Type II or unspecified type diabetes mellitus without mention of complication, not stated as uncontrolled        Patient Active Problem List   Diagnosis Code    Multiple sclerosis, relapsing-remitting ([facility]) G35    Fatigue R53.83    Centrilobular emphysema ([facility]) J43.2    RODRIGUE on CPAP G47.33    Chronic respiratory failure with hypoxia ([facility]) J96.11    Neurologic gait dysfunction R26.9    Hyperlipidemia E78.5    Primary hypertension I10    Type 2 diabetes mellitus ([facility]) E11.9     SURGICAL HISTORY       Past Surgical History:   Procedure Laterality Date    CORONARY ARTERY BYPASS GRAFT      HERNIA REPAIR         CURRENT MEDICATIONS

## 2024-05-29 ENCOUNTER — TELEPHONE (OUTPATIENT)
Dept: UROLOGY | Age: 64
End: 2024-05-29

## 2024-05-29 ENCOUNTER — OFFICE VISIT (OUTPATIENT)
Dept: UROLOGY | Age: 64
End: 2024-05-29
Payer: MEDICARE

## 2024-05-29 ENCOUNTER — HOSPITAL ENCOUNTER (OUTPATIENT)
Age: 64
Discharge: HOME | End: 2024-05-29
Payer: MEDICARE

## 2024-05-29 VITALS — BODY MASS INDEX: 30.48 KG/M2 | TEMPERATURE: 98.4 F | WEIGHT: 230 LBS | HEIGHT: 73 IN

## 2024-05-29 DIAGNOSIS — N20.1 RIGHT URETERAL STONE: Primary | ICD-10-CM

## 2024-05-29 DIAGNOSIS — R10.9 RIGHT FLANK PAIN: ICD-10-CM

## 2024-05-29 DIAGNOSIS — N13.2 HYDRONEPHROSIS CONCURRENT WITH AND DUE TO CALCULI OF KIDNEY AND URETER: ICD-10-CM

## 2024-05-29 DIAGNOSIS — N28.1 RENAL CYST, RIGHT: ICD-10-CM

## 2024-05-29 DIAGNOSIS — N21.0 BLADDER STONE: ICD-10-CM

## 2024-05-29 DIAGNOSIS — Z01.818 PRE-OP TESTING: ICD-10-CM

## 2024-05-29 DIAGNOSIS — N20.1 RIGHT URETERAL STONE: ICD-10-CM

## 2024-05-29 LAB
EKG ATRIAL RATE: 76 BPM
EKG P AXIS: 55 DEGREES
EKG P-R INTERVAL: 120 MS
EKG Q-T INTERVAL: 392 MS
EKG QRS DURATION: 102 MS
EKG QTC CALCULATION (BAZETT): 441 MS
EKG R AXIS: 70 DEGREES
EKG T AXIS: 64 DEGREES
EKG VENTRICULAR RATE: 76 BPM

## 2024-05-29 PROCEDURE — 1036F TOBACCO NON-USER: CPT | Performed by: NURSE PRACTITIONER

## 2024-05-29 PROCEDURE — G8427 DOCREV CUR MEDS BY ELIG CLIN: HCPCS | Performed by: NURSE PRACTITIONER

## 2024-05-29 PROCEDURE — 99204 OFFICE O/P NEW MOD 45 MIN: CPT | Performed by: NURSE PRACTITIONER

## 2024-05-29 PROCEDURE — 3017F COLORECTAL CA SCREEN DOC REV: CPT | Performed by: NURSE PRACTITIONER

## 2024-05-29 PROCEDURE — G8417 CALC BMI ABV UP PARAM F/U: HCPCS | Performed by: NURSE PRACTITIONER

## 2024-05-29 PROCEDURE — 93005 ELECTROCARDIOGRAM TRACING: CPT

## 2024-05-29 RX ORDER — LIDOCAINE 50 MG/G
1 PATCH TOPICAL PRN
COMMUNITY
Start: 2024-05-21

## 2024-05-29 RX ORDER — TAMSULOSIN HYDROCHLORIDE 0.4 MG/1
0.4 CAPSULE ORAL DAILY
Qty: 30 CAPSULE | Refills: 0 | Status: SHIPPED | OUTPATIENT
Start: 2024-05-29

## 2024-05-29 ASSESSMENT — ENCOUNTER SYMPTOMS
ABDOMINAL PAIN: 0
COUGH: 0
SHORTNESS OF BREATH: 0

## 2024-05-29 NOTE — PROGRESS NOTES
nervous system (HCC)     Depression     Essential hypertension     Hyperlipidemia     Hypertension     Hypoxia     Kidney stone     Multiple sclerosis ([facility]) 2012    RODRIGUE (obstructive sleep apnea)     Post herpetic neuralgia     Prolonged emergence from general anesthesia     at times wakes up \"mean\"    Reactive airway disease     unspecified asthma severity    Type II or unspecified type diabetes mellitus without mention of complication, not stated as uncontrolled      Past Surgical History:   Procedure Laterality Date    CORONARY ARTERY BYPASS GRAFT      HERNIA REPAIR       Family History   Problem Relation Age of Onset    Arthritis Mother     Diabetes Mother     Arthritis Father     Diabetes Father     Heart Disease Father     High Blood Pressure Father     Diabetes Sister     Depression Brother     Diabetes Brother     High Blood Pressure Brother     Diabetes Paternal Grandmother     Diabetes Paternal Grandfather      Outpatient Medications Marked as Taking for the 5/29/24 encounter (Office Visit) with Elisa Humphries APRN - CNP   Medication Sig Dispense Refill    lidocaine (LIDODERM) 5 % 1 patch as needed      tamsulosin (FLOMAX) 0.4 MG capsule Take 1 capsule by mouth daily 30 capsule 0    ketorolac (TORADOL) 10 MG tablet Take 1 tablet by mouth every 6 hours as needed for Pain 20 tablet 0    ondansetron (ZOFRAN) 4 MG tablet Take 1 tablet by mouth every 8 hours as needed for Nausea 20 tablet 0    levoFLOXacin (LEVAQUIN) 500 MG tablet Take 1 tablet by mouth daily for 10 days 10 tablet 0    budesonide-formoterol (SYMBICORT) 160-4.5 MCG/ACT AERO Inhale 2 puffs into the lungs 2 times daily      umeclidinium bromide (INCRUSE ELLIPTA) 62.5 MCG/ACT inhaler Inhale 1 puff into the lungs daily 90 each 3    baclofen (LIORESAL) 20 MG tablet Take 1 tablet by mouth twice daily 60 tablet 0    mometasone-formoterol (DULERA) 200-5 MCG/ACT inhaler Inhale 2 puffs into the lungs in the morning and 2 puffs in the evening. 13 g 11

## 2024-05-29 NOTE — PATIENT INSTRUCTIONS
Kidney stone prevention  Increase fluid to 60 to 80 ounces a day  Add farzana to water  Limit salt intake  Limit animal protein to 8 ounces a day  Low oxalate diet.     Cystoscopy, right ureteroscopy, laser lithotripsy, basket retrieval of stone fragments, and right ureteral stent placement per Dr Chaparro on Tuesday 6/4/24.     Continue levaquin  Continue flomax at bedtime   Continue Toradol every 6 hours pRN for pain  Take Zofran as needed for nausea   Increase fluids > 60oz daily.   Discussed warning signs or fevers, chills, increased pain  or just not feeling well.

## 2024-05-29 NOTE — PROGRESS NOTES
Follow all instructions given by your physician  Do not eat or drink anything after midnight prior to surgery(includes water, chewing gum, mints and ice chips)  Sips of water am of surgery with allowed medications  May brush teeth   Do not smoke or chew tobacco, drink alcoholic beverages or use any illicit drugs for 24 hours prior to surgery  Bring insurance info and photo ID  Bring pertinent paperwork with you from Doctor or surgeons's office  Wear clean comfortable, loose-fitting clothing  No make-up, nail polish, jewelry, piercings, or contact lenses to be worn day of surgery  No glue on dentures morning of surgery; you will be asked to remove them for surgery. Case for glasses.  Shower the night before and the morning of surgery with cleansing soap provided or a liquid antibacterial soap, dry with new fresh clean towel after each shower, no lotions, creams or powder.  Clean sheets and pillowcase on bed night before surgery  Bring medications in original bottles, Bring rescue inhalers with you  Bring CPAP/BIPAP machine if you have one ( you may be charged if one is needed in recovery room )    Do you have a DNR? no  Please Bring Healthcare Directive or Healthcare Power of  in so we can scan it into your chart.    Our pharmacy has a Meds to Beds program where they will deliver any new prescriptions you may have to your room before you leave. Our Pharmacy will clear it through your insurance; for example (same co pay). This enables you to take your new RX as soon as you need when you get home and avoids stop/wait delays on the way home.  Please have a form of payment with you and have someone designated as your Pharmacy contact with their phone # as you may not feel well or still be under the influence of anesthesia.    Please refer to the SSI-Surgical Site Infection Flyer you hopefully received in the mail-together we can prevent infections; signs and symptoms reviewed.  When discharged be sure you

## 2024-05-29 NOTE — TELEPHONE ENCOUNTER
Patient is scheduled for surgery with  on 6/4/2024. Surgery consent to be done on arrival. Patient states he/she does not follow with a cardiologist. Patient to do pre op EKG UPON LEAVING OFFICE. Surgery instructions gone over with patient verbally in the office or.     Patient informed an adult over the age of 18 must be with them at the time of surgery and upon discharge

## 2024-05-29 NOTE — TELEPHONE ENCOUNTER
DO NOT TAKE  FISH OIL, MOBIC, IBUPROFEN, MOTRIN-LIKE DRUGS AND ANY MULTIVITAMINS OR OVER THE COUNTER SUPPLEMENTS 14 DAYS PRIOR TO SURGERY.    HOLD ASPIRIN 5 DAYS PRIOR TO SURGERY  HOLD GLIMEPIRIDE AND LOSARTAN THE MORNING OF SURGERY    MUST HAVE AN ADULT OVER THE AGE OF 18 WITH YOU AT THE TIME OF THE DISCHARGE         Gaston Peña 1960     Surgical Physician: Dr. Chaparro      You have been scheduled for the procedure marked below:      Surgery: Cystoscopy, right ureteroscopy, laser lithotripsy, basket retrieval of stone fragments, and right ureteral stent placement         Date: 6/4/2024     Anesthesia:  General     Place of Service: [facility] --Second Floor Same Day Surgery         Arrive to same day surgery at:  7:30AM  (Surgery time is subject to change)      INSTRUCTIONS AS MARKED BELOW:    1.  DO NOT eat or drink anything after midnight before surgery.  2.  We prefer you shower or bathe with an antibacterial soap (Dial) the morning of surgery.  3  Please bring a current medication list, photo ID and insurance card(s) with you  4. Okay to take Tylenol  5.The office will call you in 1-2 days after your procedure to schedule a follow up.          Date: 5/29/2024

## 2024-05-29 NOTE — PROGRESS NOTES
PAT Call Date: 5/29   Surgery Date: 6/4    Surgeon: Shankar   Surgery: cysto, litho+    Any Isolation Precautions? No      Type of Isolation Precaution: Not Applicable   Is patient from a nursing home? No  Name of Nursing Home:   Any equipment assist needed for moving patient? No   Type of Equipment: Not Applicable  Patient last weight: 230 lb     Hard Copy on Chart  In EPIC Pending/Notes   Consent -   Within 30 days; signed, dated & timed by patient and physician     [] On Arrival     [] Blood      [] DNR   H&P -   Within 30 days  5/29  [] Physician To Do     Clearance -      []Medical     []Cardiac     [] Pulmonary    Orders -   Signed and Dated      [] Physician To Do    Labs -   Within 3 months   2/28  [x] CBC-ok   [x] BMP-ok   [x] GFR-ok   [] INR    [] PTT    [x] Urine -ok   [] Liver Enzymes    [] MRSA Nasal      Others:     Radiology Studies -   Within 1 year    [] Chest X-Ray   [] MRI    [] CT    [] Vascular   [] US     Pulmonary -     [] RODRIGUE   [] CPAP     Cardiac Workup -   Stress Test, Echo, Cath within 18 months  5/29  [x] EKG  -ok    [] Cath                 [] Stress Test                      [] Echo/ENEDELIA   [] CABG   [] Holter Monitor      [] Pacemaker/ICD        Brand:        Where does patient have checked:         Last check:         Rep Notified:

## 2024-05-29 NOTE — TELEPHONE ENCOUNTER
SURGERY SCHEDULING FORM   [facility] [address] [place] [address]      Phone *[phone]   Surgical Scheduling Direct Line Phone *[phone] Fax *[phone]      Gaston GAITAN Fee 1960 male    [address]  [facility] [id number]  Marital Status:          Home Phone: [phone]      Cell Phone:    No relevant phone numbers on file.          Surgeon: Dr. Chaparro       Surgery Date: 6/4/2024       Time: 9:30am    Procedure: Cystoscopy, right ureteroscopy, laser lithotripsy, basket retrieval of stone fragments, and right ureteral stent placement    Diagnosis: kidney stone     Important Medical History:  In Epic    Special Inst/Equip:     CPT Codes:    52662  Latex Allergy: No     Cardiac Device:  No    Anesthesia:  General          Admission Type:  Same Day                        Admit Prior to Day of Surgery: No    Case Location:  Main OR            Preadmission Testing:  Phone Call          PAT Date and Time:______________________________________________________    PAT Confirmation #: ______________________________________________________    Post Op Visit: ___________________________________________________________    Need Preop Cardiac Clearance: No    Does Patient have Cardiologist/physician?     none    Surgery Confirmation #: __________________________________________________    : ________________________   Date: __________________________     Insurance Company Name: Medicare

## 2024-05-30 ENCOUNTER — PREP FOR PROCEDURE (OUTPATIENT)
Dept: UROLOGY | Age: 64
End: 2024-05-30

## 2024-05-30 LAB
BACTERIA UR CULT: ABNORMAL
ORGANISM: ABNORMAL

## 2024-05-31 DIAGNOSIS — M62.838 MUSCLE SPASMS OF BOTH LOWER EXTREMITIES: ICD-10-CM

## 2024-05-31 RX ORDER — BACLOFEN 20 MG/1
TABLET ORAL
Qty: 60 TABLET | Refills: 1 | Status: SHIPPED | OUTPATIENT
Start: 2024-05-31

## 2024-05-31 NOTE — TELEPHONE ENCOUNTER
Gaston GAITAN Fee called requesting a refill on the following medications:  Requested Prescriptions     Pending Prescriptions Disp Refills    baclofen (LIORESAL) 20 MG tablet [Pharmacy Med Name: Baclofen 20 MG Oral Tablet] 60 tablet 0     Sig: Take 1 tablet by mouth twice daily       Date of last visit: 12/22/2023- Dr. Sagastume  Date of next visit (if applicable):6/7/2024- Dr. Sagastume  Date of last refill: 4/29/24  Pharmacy Name: Judi Toussaint LPN

## 2024-06-01 RX ORDER — SODIUM CHLORIDE 0.9 % (FLUSH) 0.9 %
5-40 SYRINGE (ML) INJECTION PRN
Status: CANCELLED | OUTPATIENT
Start: 2024-06-01

## 2024-06-01 RX ORDER — IPRATROPIUM BROMIDE AND ALBUTEROL SULFATE 2.5; .5 MG/3ML; MG/3ML
1 SOLUTION RESPIRATORY (INHALATION) EVERY 4 HOURS PRN
Status: CANCELLED | OUTPATIENT
Start: 2024-06-04

## 2024-06-01 RX ORDER — SODIUM CHLORIDE 9 MG/ML
INJECTION, SOLUTION INTRAVENOUS PRN
Status: CANCELLED | OUTPATIENT
Start: 2024-06-01

## 2024-06-01 RX ORDER — SODIUM CHLORIDE 0.9 % (FLUSH) 0.9 %
5-40 SYRINGE (ML) INJECTION EVERY 12 HOURS SCHEDULED
Status: CANCELLED | OUTPATIENT
Start: 2024-06-01

## 2024-06-04 ENCOUNTER — HOSPITAL ENCOUNTER (OUTPATIENT)
Age: 64
Setting detail: OUTPATIENT SURGERY
Discharge: HOME | End: 2024-06-04
Attending: UROLOGY | Admitting: UROLOGY
Payer: MEDICARE

## 2024-06-04 ENCOUNTER — ANESTHESIA EVENT (OUTPATIENT)
Dept: OPERATING ROOM | Age: 64
End: 2024-06-04
Payer: MEDICARE

## 2024-06-04 ENCOUNTER — ANESTHESIA (OUTPATIENT)
Dept: OPERATING ROOM | Age: 64
End: 2024-06-04
Payer: MEDICARE

## 2024-06-04 VITALS
SYSTOLIC BLOOD PRESSURE: 147 MMHG | TEMPERATURE: 97.6 F | BODY MASS INDEX: 30.38 KG/M2 | OXYGEN SATURATION: 94 % | DIASTOLIC BLOOD PRESSURE: 81 MMHG | HEIGHT: 73 IN | RESPIRATION RATE: 16 BRPM | HEART RATE: 68 BPM | WEIGHT: 229.2 LBS

## 2024-06-04 DIAGNOSIS — G89.18 POSTOPERATIVE PAIN: Primary | ICD-10-CM

## 2024-06-04 LAB
GLUCOSE BLD STRIP.AUTO-MCNC: 138 MG/DL (ref 70–108)
GLUCOSE BLD STRIP.AUTO-MCNC: 144 MG/DL (ref 70–108)

## 2024-06-04 PROCEDURE — 6360000002 HC RX W HCPCS: Performed by: NURSE ANESTHETIST, CERTIFIED REGISTERED

## 2024-06-04 PROCEDURE — 94640 AIRWAY INHALATION TREATMENT: CPT

## 2024-06-04 PROCEDURE — 7100000011 HC PHASE II RECOVERY - ADDTL 15 MIN: Performed by: UROLOGY

## 2024-06-04 PROCEDURE — C1769 GUIDE WIRE: HCPCS | Performed by: UROLOGY

## 2024-06-04 PROCEDURE — 3700000000 HC ANESTHESIA ATTENDED CARE: Performed by: UROLOGY

## 2024-06-04 PROCEDURE — 94761 N-INVAS EAR/PLS OXIMETRY MLT: CPT

## 2024-06-04 PROCEDURE — C1758 CATHETER, URETERAL: HCPCS | Performed by: UROLOGY

## 2024-06-04 PROCEDURE — 2580000003 HC RX 258: Performed by: UROLOGY

## 2024-06-04 PROCEDURE — 2709999900 HC NON-CHARGEABLE SUPPLY: Performed by: UROLOGY

## 2024-06-04 PROCEDURE — C2617 STENT, NON-COR, TEM W/O DEL: HCPCS | Performed by: UROLOGY

## 2024-06-04 PROCEDURE — 3700000001 HC ADD 15 MINUTES (ANESTHESIA): Performed by: UROLOGY

## 2024-06-04 PROCEDURE — 7100000000 HC PACU RECOVERY - FIRST 15 MIN: Performed by: UROLOGY

## 2024-06-04 PROCEDURE — 6360000002 HC RX W HCPCS: Performed by: UROLOGY

## 2024-06-04 PROCEDURE — 7100000010 HC PHASE II RECOVERY - FIRST 15 MIN: Performed by: UROLOGY

## 2024-06-04 PROCEDURE — 3600000003 HC SURGERY LEVEL 3 BASE: Performed by: UROLOGY

## 2024-06-04 PROCEDURE — 3600000013 HC SURGERY LEVEL 3 ADDTL 15MIN: Performed by: UROLOGY

## 2024-06-04 PROCEDURE — 2720000010 HC SURG SUPPLY STERILE: Performed by: UROLOGY

## 2024-06-04 PROCEDURE — 7100000001 HC PACU RECOVERY - ADDTL 15 MIN: Performed by: UROLOGY

## 2024-06-04 PROCEDURE — C1747 HC ENDOSCOPE, SINGLE, URINARY TRACT: HCPCS | Performed by: UROLOGY

## 2024-06-04 PROCEDURE — 6370000000 HC RX 637 (ALT 250 FOR IP): Performed by: STUDENT IN AN ORGANIZED HEALTH CARE EDUCATION/TRAINING PROGRAM

## 2024-06-04 PROCEDURE — 82948 REAGENT STRIP/BLOOD GLUCOSE: CPT

## 2024-06-04 DEVICE — URETERAL STENT
Type: IMPLANTABLE DEVICE | Site: URETER | Status: FUNCTIONAL
Brand: PERCUFLEX™ PLUS

## 2024-06-04 RX ORDER — SODIUM CHLORIDE 9 MG/ML
INJECTION, SOLUTION INTRAVENOUS PRN
Status: DISCONTINUED | OUTPATIENT
Start: 2024-06-04 | End: 2024-06-04 | Stop reason: HOSPADM

## 2024-06-04 RX ORDER — SODIUM CHLORIDE 0.9 % (FLUSH) 0.9 %
5-40 SYRINGE (ML) INJECTION PRN
Status: DISCONTINUED | OUTPATIENT
Start: 2024-06-04 | End: 2024-06-04 | Stop reason: HOSPADM

## 2024-06-04 RX ORDER — IPRATROPIUM BROMIDE AND ALBUTEROL SULFATE 2.5; .5 MG/3ML; MG/3ML
1 SOLUTION RESPIRATORY (INHALATION) EVERY 4 HOURS PRN
Status: DISCONTINUED | OUTPATIENT
Start: 2024-06-04 | End: 2024-06-04 | Stop reason: HOSPADM

## 2024-06-04 RX ORDER — SODIUM CHLORIDE 0.9 % (FLUSH) 0.9 %
5-40 SYRINGE (ML) INJECTION PRN
Status: CANCELLED | OUTPATIENT
Start: 2024-06-04

## 2024-06-04 RX ORDER — CIPROFLOXACIN 500 MG/1
500 TABLET, FILM COATED ORAL 2 TIMES DAILY
Qty: 10 TABLET | Refills: 0 | Status: SHIPPED | OUTPATIENT
Start: 2024-06-04 | End: 2024-06-09

## 2024-06-04 RX ORDER — ONDANSETRON 2 MG/ML
4 INJECTION INTRAMUSCULAR; INTRAVENOUS
Status: CANCELLED | OUTPATIENT
Start: 2024-06-04 | End: 2024-06-05

## 2024-06-04 RX ORDER — SODIUM CHLORIDE 9 MG/ML
INJECTION, SOLUTION INTRAVENOUS PRN
Status: CANCELLED | OUTPATIENT
Start: 2024-06-04

## 2024-06-04 RX ORDER — SODIUM CHLORIDE 0.9 % (FLUSH) 0.9 %
5-40 SYRINGE (ML) INJECTION EVERY 12 HOURS SCHEDULED
Status: DISCONTINUED | OUTPATIENT
Start: 2024-06-04 | End: 2024-06-04 | Stop reason: HOSPADM

## 2024-06-04 RX ORDER — TAMSULOSIN HYDROCHLORIDE 0.4 MG/1
0.4 CAPSULE ORAL DAILY
Qty: 30 CAPSULE | Refills: 0 | Status: SHIPPED | OUTPATIENT
Start: 2024-06-04 | End: 2024-07-04

## 2024-06-04 RX ORDER — OXYBUTYNIN CHLORIDE 10 MG/1
10 TABLET, EXTENDED RELEASE ORAL DAILY
Qty: 30 TABLET | Refills: 2 | Status: SHIPPED | OUTPATIENT
Start: 2024-06-04

## 2024-06-04 RX ORDER — FENTANYL CITRATE 50 UG/ML
INJECTION, SOLUTION INTRAMUSCULAR; INTRAVENOUS PRN
Status: DISCONTINUED | OUTPATIENT
Start: 2024-06-04 | End: 2024-06-04 | Stop reason: SDUPTHER

## 2024-06-04 RX ORDER — SODIUM CHLORIDE 0.9 % (FLUSH) 0.9 %
5-40 SYRINGE (ML) INJECTION EVERY 12 HOURS SCHEDULED
Status: CANCELLED | OUTPATIENT
Start: 2024-06-04

## 2024-06-04 RX ORDER — PROPOFOL 10 MG/ML
INJECTION, EMULSION INTRAVENOUS PRN
Status: DISCONTINUED | OUTPATIENT
Start: 2024-06-04 | End: 2024-06-04 | Stop reason: SDUPTHER

## 2024-06-04 RX ORDER — IPRATROPIUM BROMIDE AND ALBUTEROL SULFATE 2.5; .5 MG/3ML; MG/3ML
1 SOLUTION RESPIRATORY (INHALATION) ONCE
Status: COMPLETED | OUTPATIENT
Start: 2024-06-04 | End: 2024-06-04

## 2024-06-04 RX ORDER — DIPHENHYDRAMINE HYDROCHLORIDE 50 MG/ML
12.5 INJECTION INTRAMUSCULAR; INTRAVENOUS
Status: CANCELLED | OUTPATIENT
Start: 2024-06-04 | End: 2024-06-05

## 2024-06-04 RX ORDER — MIDAZOLAM HYDROCHLORIDE 1 MG/ML
INJECTION INTRAMUSCULAR; INTRAVENOUS PRN
Status: DISCONTINUED | OUTPATIENT
Start: 2024-06-04 | End: 2024-06-04 | Stop reason: SDUPTHER

## 2024-06-04 RX ORDER — ONDANSETRON 2 MG/ML
INJECTION INTRAMUSCULAR; INTRAVENOUS PRN
Status: DISCONTINUED | OUTPATIENT
Start: 2024-06-04 | End: 2024-06-04 | Stop reason: SDUPTHER

## 2024-06-04 RX ORDER — NALOXONE HYDROCHLORIDE 0.4 MG/ML
INJECTION, SOLUTION INTRAMUSCULAR; INTRAVENOUS; SUBCUTANEOUS PRN
Status: CANCELLED | OUTPATIENT
Start: 2024-06-04

## 2024-06-04 RX ORDER — FENTANYL CITRATE 50 UG/ML
50 INJECTION, SOLUTION INTRAMUSCULAR; INTRAVENOUS EVERY 5 MIN PRN
Status: CANCELLED | OUTPATIENT
Start: 2024-06-04

## 2024-06-04 RX ORDER — DEXAMETHASONE SODIUM PHOSPHATE 10 MG/ML
INJECTION, EMULSION INTRAMUSCULAR; INTRAVENOUS PRN
Status: DISCONTINUED | OUTPATIENT
Start: 2024-06-04 | End: 2024-06-04 | Stop reason: SDUPTHER

## 2024-06-04 RX ORDER — LIDOCAINE HYDROCHLORIDE 20 MG/ML
INJECTION, SOLUTION INTRAVENOUS PRN
Status: DISCONTINUED | OUTPATIENT
Start: 2024-06-04 | End: 2024-06-04 | Stop reason: SDUPTHER

## 2024-06-04 RX ADMIN — PROPOFOL 200 MG: 10 INJECTION, EMULSION INTRAVENOUS at 08:58

## 2024-06-04 RX ADMIN — ONDANSETRON 4 MG: 2 INJECTION INTRAMUSCULAR; INTRAVENOUS at 09:13

## 2024-06-04 RX ADMIN — LIDOCAINE HYDROCHLORIDE 100 MG: 20 INJECTION, SOLUTION INTRAVENOUS at 08:57

## 2024-06-04 RX ADMIN — FENTANYL CITRATE 50 MCG: 50 INJECTION, SOLUTION INTRAMUSCULAR; INTRAVENOUS at 09:18

## 2024-06-04 RX ADMIN — SODIUM CHLORIDE: 9 INJECTION, SOLUTION INTRAVENOUS at 08:40

## 2024-06-04 RX ADMIN — PROPOFOL 50 MG: 10 INJECTION, EMULSION INTRAVENOUS at 09:20

## 2024-06-04 RX ADMIN — WATER 2000 MG: 1 INJECTION INTRAMUSCULAR; INTRAVENOUS; SUBCUTANEOUS at 09:10

## 2024-06-04 RX ADMIN — DEXAMETHASONE SODIUM PHOSPHATE 8 MG: 10 INJECTION, EMULSION INTRAMUSCULAR; INTRAVENOUS at 09:13

## 2024-06-04 RX ADMIN — FENTANYL CITRATE 100 MCG: 50 INJECTION, SOLUTION INTRAMUSCULAR; INTRAVENOUS at 08:59

## 2024-06-04 RX ADMIN — PROPOFOL 50 MG: 10 INJECTION, EMULSION INTRAVENOUS at 09:13

## 2024-06-04 RX ADMIN — MIDAZOLAM 2 MG: 1 INJECTION INTRAMUSCULAR; INTRAVENOUS at 08:57

## 2024-06-04 RX ADMIN — IPRATROPIUM BROMIDE AND ALBUTEROL SULFATE 1 DOSE: .5; 3 SOLUTION RESPIRATORY (INHALATION) at 08:41

## 2024-06-04 RX ADMIN — FENTANYL CITRATE 50 MCG: 50 INJECTION, SOLUTION INTRAMUSCULAR; INTRAVENOUS at 09:13

## 2024-06-04 ASSESSMENT — PAIN - FUNCTIONAL ASSESSMENT
PAIN_FUNCTIONAL_ASSESSMENT: PREVENTS OR INTERFERES SOME ACTIVE ACTIVITIES AND ADLS
PAIN_FUNCTIONAL_ASSESSMENT: 0-10

## 2024-06-04 ASSESSMENT — PAIN DESCRIPTION - DESCRIPTORS: DESCRIPTORS: ACHING

## 2024-06-04 NOTE — H&P
CHAVA BILLY MD  History and Physical    Patient:  Gaston Peña  MRN: [id number]  YOB: 1960    HISTORY OF PRESENT ILLNESS:     The patient is a 63 y.o. male who presents with ureteral stone. Here for procedure.    Patient's old records, notes and chart reviewed and summarized above.     CHAVA BILLY MD independently reviewed the images and verified the radiology reports from:    No results found.      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    Chronic fatigue     COPD (chronic obstructive pulmonary disease) ([facility])     Demyelinating disease of central nervous system ([facility])     Depression     Essential hypertension     Hyperlipidemia     Hypertension     Hypoxia     Kidney stone     Multiple sclerosis ([facility]) 2012    RODRIGUE (obstructive sleep apnea)     Post herpetic neuralgia     Prolonged emergence from general anesthesia     at times wakes up \"mean\"    Reactive airway disease     unspecified asthma severity    Type II or unspecified type diabetes mellitus without mention of complication, not stated as uncontrolled        Past Surgical History:    Past Surgical History:   Procedure Laterality Date    CORONARY ARTERY BYPASS GRAFT      HERNIA REPAIR         Medications Prior to Admission:    Prior to Admission medications    Medication Sig Start Date End Date Taking? Authorizing Provider   baclofen (LIORESAL) 20 MG tablet Take 1 tablet by mouth twice daily 5/31/24   Marycarmen Sagastume MD   lidocaine (LIDODERM) 5 % 1 patch as needed 5/21/24   ProviderTala MD   tamsulosin (FLOMAX) 0.4 MG capsule Take 1 capsule by mouth daily 5/29/24   Elisa Humphries, APRN - CNP   ketorolac (TORADOL) 10 MG tablet Take 1 tablet by mouth every 6 hours as needed for Pain 5/28/24   Rasta Herbert PA-C   ondansetron (ZOFRAN) 4 MG tablet Take 1 tablet by mouth every 8 hours as needed for Nausea 5/28/24 6/17/24  Rasta Herbert PA-C  MG/3ML) 0.083% nebulizer solution Take 3 mLs by nebulization 4 times daily Patient used 2 times a day rather than 4 times a day.  Patient not taking: Reported on 5/10/2024    Tala Apple MD   TRAMADOL HCL 50 mg by Does not apply route 3 times daily.     Tala Apple MD   metoprolol (LOPRESSOR) 50 MG tablet Take 1 tablet by mouth 2 times daily    Tala Apple MD   glimepiride (AMARYL) 4 MG tablet Take 1 tablet by mouth every morning (before breakfast)    Tala Apple MD       Allergies:  Pcn [penicillins]    Social History:    Social History     Socioeconomic History    Marital status:      Spouse name: Not on file    Number of children: Not on file    Years of education: Not on file    Highest education level: Not on file   Occupational History    Not on file   Tobacco Use    Smoking status: Former     Current packs/day: 0.00     Average packs/day: 3.0 packs/day for 35.0 years (105.0 ttl pk-yrs)     Types: Cigarettes     Start date: 9/27/1973     Quit date: 9/27/2008     Years since quitting: 15.6    Smokeless tobacco: Never   Vaping Use    Vaping Use: Never used   Substance and Sexual Activity    Alcohol use: Not Currently     Comment: rare    Drug use: No     Comment: younger years    Sexual activity: Not Currently   Other Topics Concern    Not on file   Social History Narrative    Not on file     Social Determinants of Health     Financial Resource Strain: Not on file   Food Insecurity: Not on file   Transportation Needs: Not on file   Physical Activity: Not on file   Stress: Not on file   Social Connections: Not on file   Intimate Partner Violence: Not on file   Housing Stability: Not on file       Family History:    Family History   Problem Relation Age of Onset    Arthritis Mother     Diabetes Mother     Arthritis Father     Diabetes Father     Heart Disease Father     High Blood Pressure Father     Diabetes Sister     Depression Brother     Diabetes Brother     High

## 2024-06-04 NOTE — PROGRESS NOTES
Patient oriented to Same Day department and admitted to Same Day Surgery room 7.   Patient verbalized approval for first name, last initial with physician name on unit whiteboard.     Plan of care reviewed with patient.   Patient room whiteboard filled out and discussed with patient and responsible adult.   Patient and responsible adult offered Same Day Welcome Packet to review.    Call light in reach.   Bed in lowest position, locked, with one bed rail up.   SCDs and warming blanket in place.  Appropriate arm bands on patient.   Bathroom offered.   All questions and concerns of patient addressed.        Meds to Beds:   Patient informed of [facility] program during admission. Patient is agreeable to program.   Contact information for the pharmacy and the Meds to Beds program:   Name: Gaston Mariana   Relationship to patient:patient   Phone number: [phone]

## 2024-06-04 NOTE — BRIEF OP NOTE
Brief Postoperative Note      Patient: Gaston Peña  YOB: 1960  MRN: [id number]    Date of Procedure: 6/4/2024    Pre-Op Diagnosis Codes:     * Kidney stone [N20.0]    Post-Op Diagnosis: Same       Procedure(s):  CYSTO, RIGHT URETEROSCOPY, LASER LITHOTRIPSY, RIGHT URETERAL STENT PLACEMENT    Surgeon(s):  David Chaparro MD    Assistant:  * No surgical staff found *    Anesthesia: General    Estimated Blood Loss (mL): Minimal    Complications: None    Specimens:   * No specimens in log *    Implants:  Implant Name Type Inv. Item Serial No.  Lot No. LRB No. Used Action   STENT URET 6FR L26CM HYDR+ PGTL TAPR TIP GRAD BLDR MRK LO - [id number]  STENT URET 6FR L26CM HYDR+ PGTL TAPR TIP GRAD BLDR MRK LO  [id number] UROLOGY-WD [id number] Right 1 Implanted         Drains: * No LDAs found *    Findings:  Huge stone lasered. Visibility became poor. Needs second look in 2-4 weeks. Will also remove bladder stone at that time    Second procedure: cysto right urs w hll and stent exchange and cystolithalopaxy (30)        Electronically signed by DAVID CHAPARRO MD on 6/4/2024 at 3:12 PM

## 2024-06-04 NOTE — PROGRESS NOTES
939 Non reactive on arrival to PACU with spontaneous resp , oral airway and simple mask , HOB elevated   955 remains non reactive   1003 awake and oriented , oral airway removed and O2 off   Denies any pain or nausea   1012 denies any pain or nausea   1015 meets criteria for discharge , transported to [facility]

## 2024-06-04 NOTE — DISCHARGE INSTRUCTIONS
Pt ok to discharge home in good condition  No heavy lifting, >10 lbs for today  Pt should avoid strenuous activity for today  Pt should walk moderately at home  Pt ok to shower   Pt may resume diet as tolerated  Pt should take Rx as directed  No driving while on narcotics  Please call attending physician or hospital  with questions  Call or Present to ED if fever (> 101F), intractable nausea vomiting or pain.  Rx in chart    Pt should follow up with CHAVA BILLY MD, in 4 weeks, call to confirm appointment

## 2024-06-04 NOTE — ANESTHESIA POSTPROCEDURE EVALUATION
Department of Anesthesiology  Postprocedure Note    Patient: Gaston Peña  MRN: [id number]  YOB: 1960  Date of evaluation: 6/4/2024    Procedure Summary       Date: 06/04/24 Room / Location: [facility]  / [facility]    Anesthesia Start: 0856 Anesthesia Stop: 0942    Procedure: CYSTO, RIGHT URETEROSCOPY, LASER LITHOTRIPSY, RIGHT URETERAL STENT PLACEMENT (Right) Diagnosis:       Kidney stone      (Kidney stone [N20.0])    Surgeons: David Chaparro MD Responsible Provider: Marcio Stallworth DO    Anesthesia Type: general ASA Status: 3            Anesthesia Type: No value filed.    Lourdes Phase I: Lourdes Score: 10    Lourdes Phase II: Lourdes Score: 10    Anesthesia Post Evaluation    Patient location during evaluation: PACU  Patient participation: complete - patient participated  Level of consciousness: awake and alert  Airway patency: patent  Nausea & Vomiting: no vomiting and no nausea  Cardiovascular status: hemodynamically stable  Respiratory status: acceptable and room air  Hydration status: stable  Pain management: adequate    No notable events documented.

## 2024-06-04 NOTE — ANESTHESIA PRE PROCEDURE
Department of Anesthesiology  Preprocedure Note       Name:  Gaston GAITAN Fee   Age:  63 y.o.  :  1960                                          MRN:  [id number]         Date:  2024      Surgeon: Surgeon(s):  David Chaparro MD    Procedure: Procedure(s):  CYSTO, RIGHT URETEROSCOPY, POSS LASER LITHOTRIPSY, BASKET RETRIEVAL OF STONE FRAGMENTS, POSS STENT    Medications prior to admission:   Prior to Admission medications    Medication Sig Start Date End Date Taking? Authorizing Provider   baclofen (LIORESAL) 20 MG tablet Take 1 tablet by mouth twice daily 24   Marycarmen Sagastume MD   lidocaine (LIDODERM) 5 % 1 patch as needed 24   Tala Apple MD   tamsulosin (FLOMAX) 0.4 MG capsule Take 1 capsule by mouth daily 24   Elisa Humphries APRN - CNP   ketorolac (TORADOL) 10 MG tablet Take 1 tablet by mouth every 6 hours as needed for Pain 24   Rasta Herbert PA-C   ondansetron (ZOFRAN) 4 MG tablet Take 1 tablet by mouth every 8 hours as needed for Nausea 24  Rasta Herbert PA-C   levoFLOXacin (LEVAQUIN) 500 MG tablet Take 1 tablet by mouth daily for 10 days 24  Rasta Herbert PA-C   budesonide-formoterol (SYMBICORT) 160-4.5 MCG/ACT AERO Inhale 2 puffs into the lungs 2 times daily  Patient not taking: Reported on 2024    Tala Apple MD   umeclidinium bromide (INCRUSE ELLIPTA) 62.5 MCG/ACT inhaler Inhale 1 puff into the lungs daily 5/10/24   Nyla Amin APRN - CNP   mometasone-formoterol (DULERA) 200-5 MCG/ACT inhaler Inhale 2 puffs into the lungs in the morning and 2 puffs in the evening. 24   Nyla Amin APRN - CNP   ocrelizumab (OCREVUS) 300 MG/10ML SOLN injection Infuse 20 mLs intravenously every 6 months Deliver to: [facility] ATTN: Pharmacy [address] [place] [address] Ph. [phone] 24   Leopold, Paige L, APRN - CNP   albuterol sulfate HFA (VENTOLIN HFA) 108 (90 Base) MCG/ACT inhaler Inhale 2 puffs into the lungs 4 times

## 2024-06-05 ENCOUNTER — TELEPHONE (OUTPATIENT)
Dept: UROLOGY | Age: 64
End: 2024-06-05

## 2024-06-05 DIAGNOSIS — Z01.818 PRE-OP TESTING: ICD-10-CM

## 2024-06-05 DIAGNOSIS — N21.0 BLADDER STONE: ICD-10-CM

## 2024-06-05 DIAGNOSIS — N13.2 HYDRONEPHROSIS CONCURRENT WITH AND DUE TO CALCULI OF KIDNEY AND URETER: ICD-10-CM

## 2024-06-05 DIAGNOSIS — N20.1 RIGHT URETERAL STONE: Primary | ICD-10-CM

## 2024-06-05 NOTE — TELEPHONE ENCOUNTER
Patient is scheduled for surgery with  on 7/2/2024. Surgery consent to be done on arrival. Patient to do pre op URINE CULTURE ON 6/14/2024; ORDERS MAILED TO PATIENT. Surgery instructions  mailed to the patient.     Patient informed an adult over the age of 18 must be with them at the time of surgery and upon discharge

## 2024-06-05 NOTE — TELEPHONE ENCOUNTER
DO NOT TAKE  FISH OIL, MOBIC, IBUPROFEN, MOTRIN-LIKE DRUGS AND ANY MULTIVITAMINS OR OVER THE COUNTER SUPPLEMENTS 14 DAYS PRIOR TO SURGERY.    HOLD LOSARTAN AND GLIMEPIRIDE THE MORNING OF SURGERY    MUST HAVE AN ADULT OVER THE AGE OF 18 WITH YOU AT THE TIME OF THE DISCHARGE         Gaston Peña 1960     Surgical Physician: Dr. Chaparro      You have been scheduled for the procedure marked below:      Surgery: Cystoscopy, right ureteroscopy, laser lithotripsy, basket retrieval of stone fragments, and right ureteral stent exchange, Cystolitholapaxy          Date: 7/2/2024     Anesthesia:  General     Place of Service: [facility] --Second Floor Same Day Surgery         Arrive to same day surgery at:  9:30am  (Surgery time is subject to change)      INSTRUCTIONS AS MARKED BELOW:    1.  DO NOT eat or drink anything after midnight before surgery.  2.  We prefer you shower or bathe with an antibacterial soap (Dial) the morning of surgery.  3  Please bring a current medication list, photo ID and insurance card(s) with you  4. Okay to take Tylenol  5. Take blood pressure or heart medication as directed, if taken in the morning take with a small sip of water  6.The office will call you in 1-2 days after your procedure to schedule a follow up.    DATE SENSITIVE PRE OP TESTING:    TO AVOID YOUR SURGERY BEING CANCELLED DO ON THE DATE LISTED *WALK IN *NO APPOINTMENT.      DO URINE CULTURE ON 6/14/2024; ORDERS INCLUDED.        Date: 6/5/2024

## 2024-06-05 NOTE — TELEPHONE ENCOUNTER
SURGERY SCHEDULING FORM   [place] [address]      Phone *[phone] *[phone]   Surgical Scheduling Direct Line Phone *[phone] Fax *[phone]      Gaston GAITAN Fee 1960 male    [address]  [facility] [id number]  Marital Status:          Home Phone: [phone]      Cell Phone:    No relevant phone numbers on file.          Surgeon: Dr. Chaparro       Surgery Date: 7/2/2024       Time: 11:30am    Procedure: Cystoscopy, right ureteroscopy, laser lithotripsy, basket retrieval of stone fragments, and right ureteral stent exchange, Cystolitholapaxy      Diagnosis: kidney stone, bladder stone     Important Medical History:  In Epic    Special Inst/Equip:     CPT Codes:    07156, 32515  Latex Allergy: No     Cardiac Device:  No    Anesthesia:  General          Admission Type:  Same Day                        Admit Prior to Day of Surgery: No    Case Location:  Main OR            Preadmission Testing:  Phone Call          PAT Date and Time:______________________________________________________    PAT Confirmation #: ______________________________________________________    Post Op Visit: ___________________________________________________________    Need Preop Cardiac Clearance: No    Does Patient have Cardiologist/physician?     none    Surgery Confirmation #: __________________________________________________    : ________________________   Date: __________________________     Insurance Company Name: medicare

## 2024-06-20 NOTE — OP NOTE
David Chaparro MD.  Urologic Surgery      [facility]    DATE: 6/4/2024  Patient:  Gaston Peña  MRN: [id number]  YOB: 1960    SURGEON: David Chaparro MD.    ASSISTANT: none    PREOPERATIVE DIAGNOSIS: right ureteral stone right kidney stones    POSTOPERATIVE DIAGNOSIS:  right ureteral stone right kidney stones    PROCEDURE PERFORMED: cystoscopy, right ureteroscopy right holmium laser lithotripsy  right stent placement    ANESTHESIA: General    COMPLICATIONS: none    OR BLOOD LOSS:  Minimal    FLUIDS: Cystalloids per Anesthesia    SPECIMENS:  * No specimens in log *      DRAINS: 6 x 26 dbl j stent    INDICATIONS FOR PROCEDURE:  The patient is a 63 y.o. male who presents today with Kidney stone [N20.0] here for CYSTO, RIGHT URETEROSCOPY, LASER LITHOTRIPSY, RIGHT URETERAL STENT PLACEMENT. After risks, benefits and alternatives of the procedure were discussed with the patient, the patient elected to proceed.     DETAILS OF PROCEDURE:  After informed consent was obtained in the preoperative area, the patient was taken back to the operating room and transferred to the operating table in supine position.  Anesthesia was induced and antibiotics were given.  The patient was placed in modified dorsal lithotomy position and sterilely prepped and draped in a standard fashion.  A timeout occurred.  Two patient identifiers were used.     We entered the urethra with a 22 Canadian scope.     We focused our attention on the right ureteral orifice.     The ureteral orifice was visualized, and using a dual lumen catheter two wires were advanced into the kidney under fluoroscopic guidance..    The flexible ureteroscope was assembled, place over one Glidewire, and advanced into the ureter carefully under fluoroscopy. The second wire remained in place as a safety.     we were able to advance our scope up to the stone without difficulty.       The stones were located in the ureter and kidney.     We used a 200

## 2024-06-24 NOTE — PROGRESS NOTES
PAT call attempted but pt states nothing different than when this nurse called him in May for the June procedure.  He states he has the med inst and general inst from previous procedure and has no questions.    Inst pt to [facility] for urine culture. Pt verbalized understanding

## 2024-06-24 NOTE — PROGRESS NOTES
PAT Call Date: called 5/29   Surgery Date: 7/2    Surgeon: Shankar   Surgery: cysto, uretero, litho+    Any Isolation Precautions? No      Type of Isolation Precaution: Not Applicable   Is patient from a nursing home? No  Name of Nursing Home:   Any equipment assist needed for moving patient? Yes   Type of Equipment: Not Applicable  Patient last weight: 229 lb  6/4 CYSTO W/LITHO   Hard Copy on Chart  In EPIC Pending/Notes   Consent -   Within 30 days; signed, dated & timed by patient and physician     [] On Arrival     [] Blood      [] DNR   H&P -   Within 30 days  6/19  [] Physician To Do     Clearance -      []Medical     []Cardiac     [] Pulmonary    Orders -   Signed and Dated      [] Physician To Do    Labs -   Within 3 months   5/28          ordered  [x] CBC-ok    [x] BMP-ok   [x] GFR-ok   [] INR    [] PTT    [x] Urine    [] Liver Enzymes    [] MRSA Nasal      Others:     Radiology Studies -   Within 1 year      5/28  [] Chest X-Ray   [] MRI    [x] CT abd   [] Vascular   [] US     Pulmonary -     [] RODRIGUE   [] CPAP     Cardiac Workup -   Stress Test, Echo, Cath within 18 months  5/29  [x] EKG-ok   [] Cath                 [] Stress Test                      [] Echo/ENEDELIA   [] CABG   [] Holter Monitor      [] Pacemaker/ICD        Brand:        Where does patient have checked:         Last check:         Rep Notified:

## 2024-06-25 ENCOUNTER — HOSPITAL ENCOUNTER (OUTPATIENT)
Age: 64
Discharge: HOME | End: 2024-06-25
Payer: MEDICARE

## 2024-06-25 DIAGNOSIS — N13.2 HYDRONEPHROSIS CONCURRENT WITH AND DUE TO CALCULI OF KIDNEY AND URETER: ICD-10-CM

## 2024-06-25 DIAGNOSIS — Z01.818 PRE-OP TESTING: ICD-10-CM

## 2024-06-25 DIAGNOSIS — N20.1 RIGHT URETERAL STONE: ICD-10-CM

## 2024-06-25 DIAGNOSIS — N21.0 BLADDER STONE: ICD-10-CM

## 2024-06-25 PROCEDURE — 87086 URINE CULTURE/COLONY COUNT: CPT

## 2024-06-26 ENCOUNTER — PREP FOR PROCEDURE (OUTPATIENT)
Dept: UROLOGY | Age: 64
End: 2024-06-26

## 2024-06-26 LAB
BACTERIA UR CULT: ABNORMAL
ORGANISM: ABNORMAL

## 2024-06-26 RX ORDER — SULFAMETHOXAZOLE AND TRIMETHOPRIM 800; 160 MG/1; MG/1
1 TABLET ORAL 2 TIMES DAILY
Qty: 14 TABLET | Refills: 0 | Status: SHIPPED | OUTPATIENT
Start: 2024-06-26 | End: 2024-07-03

## 2024-06-30 RX ORDER — SODIUM CHLORIDE 9 MG/ML
INJECTION, SOLUTION INTRAVENOUS PRN
Status: CANCELLED | OUTPATIENT
Start: 2024-06-30

## 2024-06-30 RX ORDER — SODIUM CHLORIDE 0.9 % (FLUSH) 0.9 %
5-40 SYRINGE (ML) INJECTION EVERY 12 HOURS SCHEDULED
Status: CANCELLED | OUTPATIENT
Start: 2024-06-30

## 2024-06-30 RX ORDER — SODIUM CHLORIDE 0.9 % (FLUSH) 0.9 %
5-40 SYRINGE (ML) INJECTION PRN
Status: CANCELLED | OUTPATIENT
Start: 2024-06-30

## 2024-06-30 RX ORDER — IPRATROPIUM BROMIDE AND ALBUTEROL SULFATE 2.5; .5 MG/3ML; MG/3ML
1 SOLUTION RESPIRATORY (INHALATION) EVERY 4 HOURS PRN
Status: CANCELLED | OUTPATIENT
Start: 2024-07-02

## 2024-07-02 ENCOUNTER — ANESTHESIA EVENT (OUTPATIENT)
Dept: OPERATING ROOM | Age: 64
End: 2024-07-02
Payer: MEDICARE

## 2024-07-02 ENCOUNTER — HOSPITAL ENCOUNTER (OUTPATIENT)
Age: 64
Setting detail: OUTPATIENT SURGERY
Discharge: HOME | End: 2024-07-02
Attending: UROLOGY | Admitting: UROLOGY
Payer: MEDICARE

## 2024-07-02 ENCOUNTER — ANESTHESIA (OUTPATIENT)
Dept: OPERATING ROOM | Age: 64
End: 2024-07-02
Payer: MEDICARE

## 2024-07-02 VITALS
HEIGHT: 73 IN | BODY MASS INDEX: 30.48 KG/M2 | HEART RATE: 69 BPM | WEIGHT: 230 LBS | DIASTOLIC BLOOD PRESSURE: 80 MMHG | RESPIRATION RATE: 16 BRPM | OXYGEN SATURATION: 92 % | SYSTOLIC BLOOD PRESSURE: 159 MMHG | TEMPERATURE: 97.1 F

## 2024-07-02 DIAGNOSIS — G89.18 POSTOPERATIVE PAIN: Primary | ICD-10-CM

## 2024-07-02 LAB — GLUCOSE BLD STRIP.AUTO-MCNC: 148 MG/DL (ref 70–108)

## 2024-07-02 PROCEDURE — 2709999900 HC NON-CHARGEABLE SUPPLY: Performed by: UROLOGY

## 2024-07-02 PROCEDURE — 6370000000 HC RX 637 (ALT 250 FOR IP): Performed by: ANESTHESIOLOGY

## 2024-07-02 PROCEDURE — 7100000000 HC PACU RECOVERY - FIRST 15 MIN: Performed by: UROLOGY

## 2024-07-02 PROCEDURE — C1747 HC ENDOSCOPE, SINGLE, URINARY TRACT: HCPCS | Performed by: UROLOGY

## 2024-07-02 PROCEDURE — C1769 GUIDE WIRE: HCPCS | Performed by: UROLOGY

## 2024-07-02 PROCEDURE — 2580000003 HC RX 258: Performed by: UROLOGY

## 2024-07-02 PROCEDURE — 6360000002 HC RX W HCPCS: Performed by: UROLOGY

## 2024-07-02 PROCEDURE — 2720000010 HC SURG SUPPLY STERILE: Performed by: UROLOGY

## 2024-07-02 PROCEDURE — 7100000011 HC PHASE II RECOVERY - ADDTL 15 MIN: Performed by: UROLOGY

## 2024-07-02 PROCEDURE — 3700000000 HC ANESTHESIA ATTENDED CARE: Performed by: UROLOGY

## 2024-07-02 PROCEDURE — 7100000001 HC PACU RECOVERY - ADDTL 15 MIN: Performed by: UROLOGY

## 2024-07-02 PROCEDURE — 3700000001 HC ADD 15 MINUTES (ANESTHESIA): Performed by: UROLOGY

## 2024-07-02 PROCEDURE — 6360000002 HC RX W HCPCS: Performed by: NURSE ANESTHETIST, CERTIFIED REGISTERED

## 2024-07-02 PROCEDURE — C2617 STENT, NON-COR, TEM W/O DEL: HCPCS | Performed by: UROLOGY

## 2024-07-02 PROCEDURE — 2580000003 HC RX 258: Performed by: NURSE ANESTHETIST, CERTIFIED REGISTERED

## 2024-07-02 PROCEDURE — 3600000003 HC SURGERY LEVEL 3 BASE: Performed by: UROLOGY

## 2024-07-02 PROCEDURE — 3600000013 HC SURGERY LEVEL 3 ADDTL 15MIN: Performed by: UROLOGY

## 2024-07-02 PROCEDURE — 7100000010 HC PHASE II RECOVERY - FIRST 15 MIN: Performed by: UROLOGY

## 2024-07-02 PROCEDURE — C1758 CATHETER, URETERAL: HCPCS | Performed by: UROLOGY

## 2024-07-02 PROCEDURE — 82948 REAGENT STRIP/BLOOD GLUCOSE: CPT

## 2024-07-02 DEVICE — URETERAL STENT
Type: IMPLANTABLE DEVICE | Site: URETER | Status: FUNCTIONAL
Brand: PERCUFLEX™ PLUS

## 2024-07-02 RX ORDER — ONDANSETRON 2 MG/ML
INJECTION INTRAMUSCULAR; INTRAVENOUS PRN
Status: DISCONTINUED | OUTPATIENT
Start: 2024-07-02 | End: 2024-07-02 | Stop reason: SDUPTHER

## 2024-07-02 RX ORDER — CYCLOBENZAPRINE HCL 10 MG
10 TABLET ORAL ONCE
Status: COMPLETED | OUTPATIENT
Start: 2024-07-02 | End: 2024-07-02

## 2024-07-02 RX ORDER — FENTANYL CITRATE 50 UG/ML
50 INJECTION, SOLUTION INTRAMUSCULAR; INTRAVENOUS EVERY 5 MIN PRN
Status: DISCONTINUED | OUTPATIENT
Start: 2024-07-02 | End: 2024-07-02 | Stop reason: HOSPADM

## 2024-07-02 RX ORDER — IPRATROPIUM BROMIDE AND ALBUTEROL SULFATE 2.5; .5 MG/3ML; MG/3ML
1 SOLUTION RESPIRATORY (INHALATION) EVERY 4 HOURS PRN
Status: DISCONTINUED | OUTPATIENT
Start: 2024-07-02 | End: 2024-07-02 | Stop reason: HOSPADM

## 2024-07-02 RX ORDER — MORPHINE SULFATE 2 MG/ML
2 INJECTION, SOLUTION INTRAMUSCULAR; INTRAVENOUS EVERY 5 MIN PRN
Status: DISCONTINUED | OUTPATIENT
Start: 2024-07-02 | End: 2024-07-02 | Stop reason: HOSPADM

## 2024-07-02 RX ORDER — TRAMADOL HYDROCHLORIDE 50 MG/1
50 TABLET ORAL ONCE
Status: DISCONTINUED | OUTPATIENT
Start: 2024-07-02 | End: 2024-07-02 | Stop reason: HOSPADM

## 2024-07-02 RX ORDER — METOPROLOL SUCCINATE 50 MG/1
50 TABLET, EXTENDED RELEASE ORAL DAILY
COMMUNITY
Start: 2024-05-19

## 2024-07-02 RX ORDER — PROPOFOL 10 MG/ML
INJECTION, EMULSION INTRAVENOUS PRN
Status: DISCONTINUED | OUTPATIENT
Start: 2024-07-02 | End: 2024-07-02 | Stop reason: SDUPTHER

## 2024-07-02 RX ORDER — SODIUM CHLORIDE 0.9 % (FLUSH) 0.9 %
5-40 SYRINGE (ML) INJECTION PRN
Status: DISCONTINUED | OUTPATIENT
Start: 2024-07-02 | End: 2024-07-02 | Stop reason: HOSPADM

## 2024-07-02 RX ORDER — SODIUM CHLORIDE 0.9 % (FLUSH) 0.9 %
5-40 SYRINGE (ML) INJECTION EVERY 12 HOURS SCHEDULED
Status: DISCONTINUED | OUTPATIENT
Start: 2024-07-02 | End: 2024-07-02 | Stop reason: HOSPADM

## 2024-07-02 RX ORDER — NALOXONE HYDROCHLORIDE 0.4 MG/ML
INJECTION, SOLUTION INTRAMUSCULAR; INTRAVENOUS; SUBCUTANEOUS PRN
Status: DISCONTINUED | OUTPATIENT
Start: 2024-07-02 | End: 2024-07-02 | Stop reason: HOSPADM

## 2024-07-02 RX ORDER — SODIUM CHLORIDE 9 MG/ML
INJECTION, SOLUTION INTRAVENOUS PRN
Status: DISCONTINUED | OUTPATIENT
Start: 2024-07-02 | End: 2024-07-02 | Stop reason: HOSPADM

## 2024-07-02 RX ORDER — LABETALOL HYDROCHLORIDE 5 MG/ML
10 INJECTION INTRAVENOUS
Status: DISCONTINUED | OUTPATIENT
Start: 2024-07-02 | End: 2024-07-02 | Stop reason: HOSPADM

## 2024-07-02 RX ORDER — LIDOCAINE HCL/PF 100 MG/5ML
SYRINGE (ML) INJECTION PRN
Status: DISCONTINUED | OUTPATIENT
Start: 2024-07-02 | End: 2024-07-02 | Stop reason: SDUPTHER

## 2024-07-02 RX ORDER — SODIUM CHLORIDE 9 MG/ML
INJECTION, SOLUTION INTRAVENOUS CONTINUOUS PRN
Status: DISCONTINUED | OUTPATIENT
Start: 2024-07-02 | End: 2024-07-02 | Stop reason: SDUPTHER

## 2024-07-02 RX ORDER — FENTANYL CITRATE 50 UG/ML
INJECTION, SOLUTION INTRAMUSCULAR; INTRAVENOUS PRN
Status: DISCONTINUED | OUTPATIENT
Start: 2024-07-02 | End: 2024-07-02 | Stop reason: SDUPTHER

## 2024-07-02 RX ADMIN — ONDANSETRON 4 MG: 2 INJECTION INTRAMUSCULAR; INTRAVENOUS at 10:52

## 2024-07-02 RX ADMIN — FENTANYL CITRATE 50 MCG: 50 INJECTION, SOLUTION INTRAMUSCULAR; INTRAVENOUS at 10:57

## 2024-07-02 RX ADMIN — WATER 2000 MG: 1 INJECTION INTRAMUSCULAR; INTRAVENOUS; SUBCUTANEOUS at 10:56

## 2024-07-02 RX ADMIN — PROPOFOL 200 MG: 10 INJECTION, EMULSION INTRAVENOUS at 10:52

## 2024-07-02 RX ADMIN — SODIUM CHLORIDE: 9 INJECTION, SOLUTION INTRAVENOUS at 10:03

## 2024-07-02 RX ADMIN — Medication 100 MG: at 10:52

## 2024-07-02 RX ADMIN — SODIUM CHLORIDE: 9 INJECTION, SOLUTION INTRAVENOUS at 10:47

## 2024-07-02 RX ADMIN — CYCLOBENZAPRINE 10 MG: 10 TABLET, FILM COATED ORAL at 12:26

## 2024-07-02 RX ADMIN — FENTANYL CITRATE 50 MCG: 50 INJECTION, SOLUTION INTRAMUSCULAR; INTRAVENOUS at 10:52

## 2024-07-02 ASSESSMENT — PAIN SCALES - GENERAL
PAINLEVEL_OUTOF10: 5
PAINLEVEL_OUTOF10: 2

## 2024-07-02 ASSESSMENT — PAIN - FUNCTIONAL ASSESSMENT
PAIN_FUNCTIONAL_ASSESSMENT: 0-10
PAIN_FUNCTIONAL_ASSESSMENT: NONE - DENIES PAIN

## 2024-07-02 ASSESSMENT — PAIN DESCRIPTION - LOCATION
LOCATION: LEG
LOCATION: LEG

## 2024-07-02 ASSESSMENT — PAIN DESCRIPTION - DESCRIPTORS
DESCRIPTORS: CRAMPING
DESCRIPTORS: CRAMPING

## 2024-07-02 NOTE — ANESTHESIA PRE PROCEDURE
05:06 PM    CREATININE 0.6 05/28/2024 05:06 PM    AGRATIO 1.1 06/12/2023 08:28 AM    LABGLOM > 90 05/28/2024 05:06 PM    LABGLOM >90 04/17/2012 08:44 AM    GLUCOSE 101 05/28/2024 05:06 PM    GLUCOSE 111 06/12/2023 08:28 AM    CALCIUM 9.1 05/28/2024 05:06 PM    BILITOT 0.4 12/18/2023 08:23 AM    ALKPHOS 94 12/18/2023 08:23 AM    AST 20 12/18/2023 08:23 AM    ALT 17 12/18/2023 08:23 AM       POC Tests:   Recent Labs     07/02/24  0931   POCGLU 148*       Coags:   Lab Results   Component Value Date/Time    PROTIME 1.02 10/24/2011 06:50 PM    INR 1.03 03/23/2012 12:50 PM    APTT 24.0 03/23/2012 12:50 PM       HCG (If Applicable): No results found for: \"PREGTESTUR\", \"PREGSERUM\", \"HCG\", \"HCGQUANT\"     ABGs: No results found for: \"PHART\", \"PO2ART\", \"NBS3BJK\", \"TZS6TPR\", \"BEART\", \"W0VQQXJQ\"     Type & Screen (If Applicable):  No results found for: \"LABABO\"    Drug/Infectious Status (If Applicable):  No results found for: \"HIV\", \"HEPCAB\"    COVID-19 Screening (If Applicable): No results found for: \"COVID19\"        Anesthesia Evaluation    Airway: Mallampati: II  TM distance: >3 FB   Neck ROM: full  Mouth opening: > = 3 FB   Dental:    (+) poor dentition      Pulmonary:   (+)  COPD:    sleep apnea:   decreased breath sounds                               Cardiovascular:    (+) hypertension:        Rhythm: regular                      Neuro/Psych:   (+) psychiatric history:            GI/Hepatic/Renal:             Endo/Other:    (+) Diabetes.                 Abdominal:   (+) obese          Vascular:          Other Findings:       Anesthesia Plan      general     ASA 4           MIPS: Postoperative opioids intended and Prophylactic antiemetics administered.  Anesthetic plan and risks discussed with patient.      Plan discussed with AZALIA Goldman MD   7/2/2024

## 2024-07-02 NOTE — PROGRESS NOTES
1135: pt arrives to pacu. Pt on room air. Pt responds to verbal stimulation. VSS. Respirations unlabored. Stent with strings intact. Pt denies pain   1143: pt resting in bed   1144: pt placed on 2L nasal cannula   1148: pt having legs cramps. Pt sitting on the side of the bed   1155: pt sitting up ion bed   1205: pt meets discharge criteria from pacu. Pt transported to [facility]

## 2024-07-02 NOTE — PROGRESS NOTES
Pt returned to [facility] room 17. Vitals and assessment as charted. 0.9 infusing, to count from PACU. Pt has crackers and water. Family at the bedside. Pt and family verbalized understanding of discharge criteria and call light use. Call light in reach.

## 2024-07-02 NOTE — BRIEF OP NOTE
Brief Postoperative Note      Patient: Gaston Peña  YOB: 1960  MRN: [id number]    Date of Procedure: 7/2/2024    Pre-Op Diagnosis Codes:     * Kidney stone [N20.0]     * Bladder stone [N21.0]    Post-Op Diagnosis: Same       Procedure(s):  Cystoscopy Right Ureteroscopy Laser Lithotripsy,Right Ureteral Stent Exchange, Cystolitholapaxy    Surgeon(s):  David Chaparro MD    Assistant:  * No surgical staff found *    Anesthesia: General    Estimated Blood Loss (mL): Minimal    Complications: None    Specimens:   * No specimens in log *    Implants:  Implant Name Type Inv. Item Serial No.  Lot No. LRB No. Used Action   STENT URET 6FR L26CM HYDR+ PGTL TAPR TIP GRAD BLDR MRK  - [id number]  STENT URET 6FR L26CM HYDR+ PGTL TAPR TIP GRAD BLDR MRK LO  [employer] [facility] UROLOGY-WD [id number] Right 1 Implanted         Drains: * No LDAs found *    Findings:  Stones treated and large bladder stone treated. Pull stent five days. Syed 2 weeks postop check    Electronically signed by DAVID CHAPARRO MD on 7/2/2024 at 11:57 AM

## 2024-07-02 NOTE — PROGRESS NOTES
Pt has met discharge criteria and states he is ready for discharge to home. IV removed, gauze and tape applied. Dressed in own clothes and personal belongings gathered. Discharge instructions (with opioid medication education information) given to pt and family; pt and family verbalized understanding of discharge instructions, prescriptions and follow up appointments. Pt transported to discharge lobby by [facility] staff.

## 2024-07-02 NOTE — PROGRESS NOTES
Patient oriented to Same Day department and admitted to Same Day Surgery room 17.   Patient verbalized approval for first name, last initial with physician name on unit whiteboard.     Plan of care reviewed with patient.   Patient room whiteboard filled out and discussed with patient and responsible adult.   Patient and responsible adult offered Same Day Welcome Packet to review.    Call light in reach.   Bed in lowest position, locked, with one bed rail up.   SCDs and warming blanket in place.  Appropriate arm bands on patient.   Bathroom offered.   All questions and concerns of patient addressed.        Meds to Beds:   Patient informed of [facility] Meds to Beds program during admission. Patient has declined use of program.   Contact information for the pharmacy and the Meds to Beds program:   Name: pamela   Relationship to patient:child   Phone number: [phone]

## 2024-07-02 NOTE — DISCHARGE INSTRUCTIONS
Pt ok to discharge home in good condition  No heavy lifting, >10 lbs for today  Pt should avoid strenuous activity for today  Pt should walk moderately at home  Pt ok to shower   Pt may resume diet as tolerated  Pt should take Rx as directed  No driving while on narcotics  Please call attending physician or hospital  with questions  Call or Present to ED if fever (> 101F), intractable nausea vomiting or pain.  Rx in chart    Pt should follow up with CHAVA BILLY MD, in 4 weeks, call to confirm appointment  ''    Pt should Pull stent in 5 days. There may be some pain associated with the stent removal, which is usually self-limiting.  We suggest using the pain medication prescribed for you and a nonsteroidal anti-inflammatory such as Ibuprofen, if you are able to take this medication, to control symptoms.  Please stay hydrated. Please call with questions.

## 2024-07-02 NOTE — ANESTHESIA POSTPROCEDURE EVALUATION
Department of Anesthesiology  Postprocedure Note    Patient: Gaston Peña  MRN: [id number]  YOB: 1960  Date of evaluation: 7/2/2024    Procedure Summary       Date: 07/02/24 [facility]    Anesthesia Start: 1047 Anesthesia Stop: 1137    Procedure: Cystoscopy Right Ureteroscopy Laser Lithotripsy,Right Ureteral Stent Exchange, Cystolitholapaxy Diagnosis:       Kidney stone      Bladder stone      (Kidney stone [N20.0])      (Bladder stone [N21.0])    Surgeons: David Chaparro MD Responsible Provider: Guilherme Goldman MD    Anesthesia Type: general ASA Status: 4            Anesthesia Type: No value filed.    Lourdes Phase I: Lourdes Score: 9    Lourdes Phase II: Lourdes Score: 10    Anesthesia Post Evaluation    Patient location during evaluation: PACU  Patient participation: complete - patient participated  Level of consciousness: awake  Airway patency: patent  Nausea & Vomiting: no vomiting and no nausea  Cardiovascular status: hemodynamically stable  Respiratory status: acceptable and nasal cannula  Hydration status: stable  Pain management: adequate    No notable events documented.

## 2024-07-02 NOTE — H&P
DAVID CHAPARRO MD  History and Physical    Patient:  Gaston Peña  MRN: [id number]  YOB: 1960    HISTORY OF PRESENT ILLNESS:     The patient is a 63 y.o. male who presents with right kidney stone. Here for procedure.    Patient's old records, notes and chart reviewed and summarized above.     DAVID CHAPARRO MD independently reviewed the images and verified the radiology reports from:    No results found.      Past Medical History:    Past Medical History:   Diagnosis Date    Adjustment disorder     Bypass graft stenosis (HCC) 2008    Chronic fatigue     COPD (chronic obstructive pulmonary disease) ([facility])     Demyelinating disease of central nervous system (HCC)     Depression     Essential hypertension     Hyperlipidemia     Hypertension     Hypoxia     Kidney stone     Multiple sclerosis (HCC) 2012    RODRIGUE (obstructive sleep apnea)     Post herpetic neuralgia     Prolonged emergence from general anesthesia     at times wakes up \"mean\"    Reactive airway disease     unspecified asthma severity    Type II or unspecified type diabetes mellitus without mention of complication, not stated as uncontrolled        Past Surgical History:    Past Surgical History:   Procedure Laterality Date    CORONARY ARTERY BYPASS GRAFT      HERNIA REPAIR      URETER SURGERY Right 6/4/2024    CYSTO, RIGHT URETEROSCOPY, LASER LITHOTRIPSY, RIGHT URETERAL STENT PLACEMENT performed by David Chaparro MD at [facility] OR       Medications Prior to Admission:    Prior to Admission medications    Medication Sig Start Date End Date Taking? Authorizing Provider   sulfamethoxazole-trimethoprim (BACTRIM DS;SEPTRA DS) 800-160 MG per tablet Take 1 tablet by mouth 2 times daily for 7 days 6/26/24 7/3/24  Kyle Flannery APRN - CNP   tamsulosin (FLOMAX) 0.4 MG capsule Take 1 capsule by mouth daily 6/4/24 7/4/24  David Chaparro MD   oxyBUTYnin (DITROPAN XL) 10 MG extended release tablet Take 1 tablet by mouth daily 6/4/24   David Chaparro MD   baclofen

## 2024-07-09 NOTE — OP NOTE
David Chaparro MD.  Urologic Surgery      [place]    DATE: 7/2/2024  Patient:  Gaston Peña  MRN: [id number]  YOB: 1960    SURGEON: David Chaparro MD.    ASSISTANT: none    PREOPERATIVE DIAGNOSIS: right ureteral stone      POSTOPERATIVE DIAGNOSIS:  right ureteral stone      PROCEDURE PERFORMED: cystoscopy, right ureteroscopy right holmium laser lithotripsy right stone basketing cystolithalopaxy    ANESTHESIA: General    COMPLICATIONS: none    OR BLOOD LOSS:  Minimal    FLUIDS: Cystalloids per Anesthesia    SPECIMENS:  * No specimens in log *      DRAINS: 6 x 26 dbl j stent    INDICATIONS FOR PROCEDURE:  The patient is a 63 y.o. male who presents today with Kidney stone [N20.0]  Bladder stone [N21.0] here for Cystoscopy Right Ureteroscopy Laser Lithotripsy,Right Ureteral Stent Exchange, Cystolitholapaxy. After risks, benefits and alternatives of the procedure were discussed with the patient, the patient elected to proceed.     DETAILS OF PROCEDURE:  After informed consent was obtained in the preoperative area, the patient was taken back to the operating room and transferred to the operating table in supine position.  Anesthesia was induced and antibiotics were given.  The patient was placed in modified dorsal lithotomy position and sterilely prepped and draped in a standard fashion.  A timeout occurred.  Two patient identifiers were used.     We entered the urethra with a 22 Malay scope.     We focused our attention on the right ureteral orifice.     The stent was seen emanating from the ureteral orifice. It was grasped using a foreign body grasper and brought to the ureteral meatus. A wire was placed through the stent into the kidney under fluoroscopic guidance. The stent was removed, and a dual lumen catheter was used to place a second wire into the kidney under fluoroscopic guidance..    The flexible ureteroscope was assembled, place over one Glidewire, and advanced into the ureter

## 2024-07-24 ENCOUNTER — OFFICE VISIT (OUTPATIENT)
Dept: UROLOGY | Age: 64
End: 2024-07-24
Payer: MEDICARE

## 2024-07-24 VITALS
DIASTOLIC BLOOD PRESSURE: 70 MMHG | BODY MASS INDEX: 29.78 KG/M2 | WEIGHT: 224.7 LBS | SYSTOLIC BLOOD PRESSURE: 128 MMHG | HEIGHT: 73 IN

## 2024-07-24 DIAGNOSIS — N20.1 RIGHT URETERAL STONE: Primary | ICD-10-CM

## 2024-07-24 DIAGNOSIS — N13.8 BPH WITH OBSTRUCTION/LOWER URINARY TRACT SYMPTOMS: ICD-10-CM

## 2024-07-24 DIAGNOSIS — N21.0 BLADDER STONE: ICD-10-CM

## 2024-07-24 DIAGNOSIS — R10.9 RIGHT FLANK PAIN: ICD-10-CM

## 2024-07-24 DIAGNOSIS — N28.1 RENAL CYST, RIGHT: ICD-10-CM

## 2024-07-24 DIAGNOSIS — N40.1 BPH WITH OBSTRUCTION/LOWER URINARY TRACT SYMPTOMS: ICD-10-CM

## 2024-07-24 DIAGNOSIS — N13.2 HYDRONEPHROSIS CONCURRENT WITH AND DUE TO CALCULI OF KIDNEY AND URETER: ICD-10-CM

## 2024-07-24 PROCEDURE — 3074F SYST BP LT 130 MM HG: CPT | Performed by: NURSE PRACTITIONER

## 2024-07-24 PROCEDURE — G8427 DOCREV CUR MEDS BY ELIG CLIN: HCPCS | Performed by: NURSE PRACTITIONER

## 2024-07-24 PROCEDURE — 1036F TOBACCO NON-USER: CPT | Performed by: NURSE PRACTITIONER

## 2024-07-24 PROCEDURE — G8417 CALC BMI ABV UP PARAM F/U: HCPCS | Performed by: NURSE PRACTITIONER

## 2024-07-24 PROCEDURE — 3078F DIAST BP <80 MM HG: CPT | Performed by: NURSE PRACTITIONER

## 2024-07-24 PROCEDURE — 3017F COLORECTAL CA SCREEN DOC REV: CPT | Performed by: NURSE PRACTITIONER

## 2024-07-24 PROCEDURE — 99214 OFFICE O/P EST MOD 30 MIN: CPT | Performed by: NURSE PRACTITIONER

## 2024-07-24 RX ORDER — TAMSULOSIN HYDROCHLORIDE 0.4 MG/1
0.4 CAPSULE ORAL DAILY
Qty: 90 CAPSULE | Refills: 0 | Status: SHIPPED | OUTPATIENT
Start: 2024-07-24

## 2024-07-24 NOTE — PATIENT INSTRUCTIONS
Kidney stone prevention  Increase fluid to 60 to 80 ounces a day  Add farzana to water  Limit salt intake  Limit animal protein to 8 ounces a day  Low oxalate diet.      [facility]- 24 hour urin collection, call 1-800 number  on order for kit to be sent to home    KUB x-ray and MANUELA in 3 months to eval stone burden and to ensure resolution hydro in 2 to 3 months.     Start flomax 0.4mg for BPH    Can trial off oxybutynin as this was for stent pain.  However if urgency or frequency worsens can consider restarting.

## 2024-07-24 NOTE — PROGRESS NOTES
[facility] [place] [facility]  [facility] UROLOGY  [address]  [address]  [place] [address]  Dept: [phone]  Loc: [phone]    Visit Date: 7/24/2024        HPI:   The patient is a 63 y.o. male who presents today for evaluation of the following problem(s): right ureteral stone with hydro. Right flank pain, bladder stone, renal cyst, post stone treatment      Seen at [facility] ER 5/28/24 due to  flank pain on right that had been ongoing for 7 months. No fevers, chills.  Urine cx no growth prelim.  UA small leuk. WBC 15-25.  RBC 5-10. Cre 0.6. WBC 12.0.  Given flomax, toradol, levaquin and zofran in ER. CT scan without 5/28/24.  Severe right-sided hydronephrosis and hydroureter secondary to a 12 x 16 mm obstructing calculus at the right ureteropelvic junction is observed. A 3 mm nonobstructing calculus in the midpole of the right kidney was identified. A 1 mm nonobstructing calculus in the midpole of the right kidney is observed. A 4 cm simple cyst in the midpole of the right kidney is visualized. A 2 mm nonobstructing calculus in the lower pole of the left kidney is observed. A calculus along the right posterior wall of the urinary bladder measures 10 mm.       Seen 5/29/24 in consult. No dysuria, no gross hematuria. Onset right sided flank pain since Nov 2023.  Little worse.  Was a 2 and now a 4/10 over past 2 months. Lower urinary tract symptoms: weak stream for years, urgency later in day but now new.  No fevers, chills, abdominal pain, dysuria, hematuria. IPSS 10/3.      S/p cystoscopy, right ureteroscopy right holmium laser lithotripsy  right stent placement 6/4/24 with Dr burleson. Needs second procedure.    S/p Cystoscopy Right Ureteroscopy Laser Lithotripsy,Right Ureteral Stent Exchange, Cystolitholapaxy 7/2/24 with Dr Burleson. Stones treated and large bladder stone treated.    Seen today 7/24/24.  Unable to urinate today as just went prior to coming,.  Stent was pulled after 5 days. No

## 2024-07-29 DIAGNOSIS — M62.838 MUSCLE SPASMS OF BOTH LOWER EXTREMITIES: ICD-10-CM

## 2024-07-29 RX ORDER — BACLOFEN 20 MG/1
TABLET ORAL
Qty: 60 TABLET | Refills: 1 | Status: SHIPPED | OUTPATIENT
Start: 2024-07-29

## 2024-07-29 NOTE — TELEPHONE ENCOUNTER
Gaston GAITAN Fee called requesting a refill on the following medications:  Requested Prescriptions     Pending Prescriptions Disp Refills    baclofen (LIORESAL) 20 MG tablet [Pharmacy Med Name: Baclofen 20 MG Oral Tablet] 60 tablet 0     Sig: Take 1 tablet by mouth twice daily       Date of last visit: 12/22/2023-  Date of next visit (if applicable):Visit date not found  Date of last refill: 5/31/24  Pharmacy Name: Judi Toussaint LPN

## 2024-08-05 ENCOUNTER — TELEPHONE (OUTPATIENT)
Dept: UROLOGY | Age: 64
End: 2024-08-05

## 2024-08-05 ASSESSMENT — ENCOUNTER SYMPTOMS
COUGH: 0
ABDOMINAL PAIN: 0
SHORTNESS OF BREATH: 0

## 2024-08-05 NOTE — TELEPHONE ENCOUNTER
Patient scheduled for US RENAL COMPLETE  at [facility] on 1022/24.  Arrival of 745 AM for a 8 AM scan time.  Order mailed with instructions or given to the patient in the office

## 2024-08-16 ENCOUNTER — HOSPITAL ENCOUNTER (EMERGENCY)
Age: 64
Discharge: HOME | End: 2024-08-16
Attending: PHYSICIAN ASSISTANT
Payer: MEDICARE

## 2024-08-16 ENCOUNTER — APPOINTMENT (OUTPATIENT)
Dept: GENERAL RADIOLOGY | Age: 64
End: 2024-08-16
Payer: MEDICARE

## 2024-08-16 ENCOUNTER — HOSPITAL ENCOUNTER (OUTPATIENT)
Dept: NURSING | Age: 64
Discharge: HOME | End: 2024-08-16

## 2024-08-16 VITALS
BODY MASS INDEX: 29.95 KG/M2 | HEART RATE: 74 BPM | RESPIRATION RATE: 18 BRPM | WEIGHT: 227 LBS | TEMPERATURE: 97.3 F | OXYGEN SATURATION: 94 %

## 2024-08-16 VITALS
HEART RATE: 68 BPM | OXYGEN SATURATION: 96 % | TEMPERATURE: 98.1 F | SYSTOLIC BLOOD PRESSURE: 120 MMHG | DIASTOLIC BLOOD PRESSURE: 83 MMHG | RESPIRATION RATE: 20 BRPM

## 2024-08-16 DIAGNOSIS — R03.0 ELEVATED BLOOD PRESSURE READING: Primary | ICD-10-CM

## 2024-08-16 DIAGNOSIS — I49.3 PVC (PREMATURE VENTRICULAR CONTRACTION): ICD-10-CM

## 2024-08-16 LAB
ANION GAP SERPL CALC-SCNC: 13 MEQ/L (ref 8–16)
BASOPHILS ABSOLUTE: 0.1 THOU/MM3 (ref 0–0.1)
BASOPHILS NFR BLD AUTO: 0.6 %
BUN SERPL-MCNC: 11 MG/DL (ref 7–22)
CALCIUM SERPL-MCNC: 9.2 MG/DL (ref 8.5–10.5)
CHLORIDE SERPL-SCNC: 101 MEQ/L (ref 98–111)
CO2 SERPL-SCNC: 25 MEQ/L (ref 23–33)
CREAT SERPL-MCNC: 0.6 MG/DL (ref 0.4–1.2)
DEPRECATED RDW RBC AUTO: 46.9 FL (ref 35–45)
EOSINOPHIL NFR BLD AUTO: 1.4 %
EOSINOPHILS ABSOLUTE: 0.2 THOU/MM3 (ref 0–0.4)
ERYTHROCYTE [DISTWIDTH] IN BLOOD BY AUTOMATED COUNT: 13.8 % (ref 11.5–14.5)
GFR SERPL CREATININE-BSD FRML MDRD: > 90 ML/MIN/1.73M2
GLUCOSE SERPL-MCNC: 134 MG/DL (ref 70–108)
HCT VFR BLD AUTO: 46.8 % (ref 42–52)
HGB BLD-MCNC: 15.2 GM/DL (ref 14–18)
IMM GRANULOCYTES # BLD AUTO: 0.05 THOU/MM3 (ref 0–0.07)
IMM GRANULOCYTES NFR BLD AUTO: 0.5 %
LYMPHOCYTES ABSOLUTE: 1.2 THOU/MM3 (ref 1–4.8)
LYMPHOCYTES NFR BLD AUTO: 11.4 %
MAGNESIUM SERPL-MCNC: 2 MG/DL (ref 1.6–2.4)
MCH RBC QN AUTO: 30.2 PG (ref 26–33)
MCHC RBC AUTO-ENTMCNC: 32.5 GM/DL (ref 32.2–35.5)
MCV RBC AUTO: 92.9 FL (ref 80–94)
MONOCYTES ABSOLUTE: 1 THOU/MM3 (ref 0.4–1.3)
MONOCYTES NFR BLD AUTO: 8.9 %
NEUTROPHILS ABSOLUTE: 8.4 THOU/MM3 (ref 1.8–7.7)
NEUTROPHILS NFR BLD AUTO: 77.2 %
NRBC BLD AUTO-RTO: 0 /100 WBC
OSMOLALITY SERPL CALC.SUM OF ELEC: 278.9 MOSMOL/KG (ref 275–300)
PLATELET # BLD AUTO: 324 THOU/MM3 (ref 130–400)
PMV BLD AUTO: 9 FL (ref 9.4–12.4)
POTASSIUM SERPL-SCNC: 4.4 MEQ/L (ref 3.5–5.2)
RBC # BLD AUTO: 5.04 MILL/MM3 (ref 4.7–6.1)
SODIUM SERPL-SCNC: 139 MEQ/L (ref 135–145)
T4 FREE SERPL-MCNC: 1.4 NG/DL (ref 0.93–1.68)
TROPONIN, HIGH SENSITIVITY: 25 NG/L (ref 0–12)
TROPONIN, HIGH SENSITIVITY: 27 NG/L (ref 0–12)
TSH SERPL DL<=0.005 MIU/L-ACNC: 1.32 UIU/ML (ref 0.4–4.2)
WBC # BLD AUTO: 10.9 THOU/MM3 (ref 4.8–10.8)

## 2024-08-16 PROCEDURE — 71046 X-RAY EXAM CHEST 2 VIEWS: CPT

## 2024-08-16 PROCEDURE — 99285 EMERGENCY DEPT VISIT HI MDM: CPT

## 2024-08-16 PROCEDURE — 84484 ASSAY OF TROPONIN QUANT: CPT

## 2024-08-16 PROCEDURE — 83735 ASSAY OF MAGNESIUM: CPT

## 2024-08-16 PROCEDURE — 85025 COMPLETE CBC W/AUTO DIFF WBC: CPT

## 2024-08-16 PROCEDURE — 93005 ELECTROCARDIOGRAM TRACING: CPT | Performed by: PHYSICIAN ASSISTANT

## 2024-08-16 PROCEDURE — 80048 BASIC METABOLIC PNL TOTAL CA: CPT

## 2024-08-16 PROCEDURE — 36415 COLL VENOUS BLD VENIPUNCTURE: CPT

## 2024-08-16 PROCEDURE — 84443 ASSAY THYROID STIM HORMONE: CPT

## 2024-08-16 PROCEDURE — 84439 ASSAY OF FREE THYROXINE: CPT

## 2024-08-16 ASSESSMENT — PAIN - FUNCTIONAL ASSESSMENT
PAIN_FUNCTIONAL_ASSESSMENT: NONE - DENIES PAIN
PAIN_FUNCTIONAL_ASSESSMENT: NONE - DENIES PAIN
PAIN_FUNCTIONAL_ASSESSMENT: 0-10
PAIN_FUNCTIONAL_ASSESSMENT: NONE - DENIES PAIN

## 2024-08-16 ASSESSMENT — PAIN SCALES - GENERAL: PAINLEVEL_OUTOF10: 0

## 2024-08-16 NOTE — ED NOTES
Resting on side of bed. Made aware of lab draw time. Voiced no concerns at this time. Will monitor

## 2024-08-16 NOTE — PROGRESS NOTES
Patients Ocrevus infusion cancelled. Patient is having high blood pressures and a irregular heart rate.  P. Leopold, cnp was notified and she ordered the patient to go to the ER.

## 2024-08-16 NOTE — ED PROVIDER NOTES
Behavior: Behavior normal.         Thought Content: Thought content normal.         Judgment: Judgment normal.         FORMAL DIAGNOSTIC RESULTS     RADIOLOGY: Interpretation per the Radiologist below, if available at the time of this note (none if blank):    XR CHEST (2 VW)   Final Result   1. Normal heart size. Metallic sternotomy sutures and vascular clips from prior   surgery.   2. No acute findings. No infiltrates or effusions are seen.            **This report has been created using voice recognition software.  It may contain   minor errors which are inherent in voice recognition technology.**            Electronically signed by Dr. Edin Davison          LABS: (none if blank)  Labs Reviewed   BASIC METABOLIC PANEL - Abnormal; Notable for the following components:       Result Value    Glucose 134 (*)     All other components within normal limits   CBC WITH AUTO DIFFERENTIAL - Abnormal; Notable for the following components:    WBC 10.9 (*)     RDW-SD 46.9 (*)     MPV 9.0 (*)     Neutrophils Absolute 8.4 (*)     All other components within normal limits   TROPONIN - Abnormal; Notable for the following components:    Troponin, High Sensitivity 27 (*)     All other components within normal limits   TROPONIN - Abnormal; Notable for the following components:    Troponin, High Sensitivity 25 (*)     All other components within normal limits   MAGNESIUM   TSH   T4, FREE   ANION GAP   GLOMERULAR FILTRATION RATE, ESTIMATED   OSMOLALITY       (Any cultures that may have been sent were not resulted at the time of this patient visit)    MEDICAL DECISION MAKING / ED COURSE:   MDM  /   Vitals Reviewed:    Vitals:    08/16/24 0912 08/16/24 1044 08/16/24 1149 08/16/24 1327   BP:  121/62 121/70 120/83   Pulse: 68 70 68 68   Resp: 14 18 18 20   Temp:       TempSrc:       SpO2: 94%  94% 96%       External Documentation: Previous patient encounter documents & history available on EMR was reviewed            MDM and ED

## 2024-08-16 NOTE — DISCHARGE INSTRUCTIONS
ACTIVITY:  Continue usual care with your doctor. Call your doctor immediately if any severe problems or go to the nearest emergency room.  Call to reschedule after Cardiac issues are addressed.   Please call to reschedule [phone]    I have been treated and hereby acknowledge receiving this instruction sheet.

## 2024-08-16 NOTE — ED TRIAGE NOTES
Presents to ER from outpatient after being told his HR was Irregular. Reports he was to receive infusion of Ocrevus today for MS. Reports BP was high as well. Pt reports he was asymptomatic. Resting in bed comfortably. Denies any CP, SOB, or dizziness. Every third beat is a PVC on rhythm strip sent by outpatient. This RN to monitor

## 2024-08-16 NOTE — PROGRESS NOTES
Patient admitted to room sds 3 for an IV infusion.. Patient offered rights and responsibilities.     __m__ Safety:       (Environmental)  [place] to environment  Ensure ID band is correct and in place/ allergy band as needed  Assess for fall risk  Initiate fall precautions as applicable (fall band, side rails, etc.)  Call light within reach  Bed in low position/ wheels locked    __m__ Pain:       Assess pain level and characteristics  Administer analgesics as ordered  Assess effectiveness of pain management and report to MD as needed    __m__ Knowledge Deficit:  Assess baseline knowledge  Provide teaching at level of understanding  Provide teaching via preferred learning method  Evaluate teaching effectiveness    _m___ Hemodynamic/Respiratory Status:       (Pre and Post Procedure Monitoring)  Assess/Monitor vital signs and LOC  Assess Baseline SpO2 prior to any sedation  Obtain weight/height  Assess vital signs/ LOC until patient meets discharge criteria  Monitor procedure site and notify MD of any issues

## 2024-08-17 LAB
EKG ATRIAL RATE: 65 BPM
EKG P AXIS: 37 DEGREES
EKG P-R INTERVAL: 156 MS
EKG Q-T INTERVAL: 428 MS
EKG QRS DURATION: 102 MS
EKG QTC CALCULATION (BAZETT): 445 MS
EKG R AXIS: 59 DEGREES
EKG T AXIS: 36 DEGREES
EKG VENTRICULAR RATE: 65 BPM

## 2024-08-17 PROCEDURE — 93010 ELECTROCARDIOGRAM REPORT: CPT | Performed by: INTERNAL MEDICINE

## 2024-08-19 ENCOUNTER — HOSPITAL ENCOUNTER (OUTPATIENT)
Dept: NURSING | Age: 64
Discharge: HOME | End: 2024-08-19
Payer: MEDICARE

## 2024-08-19 VITALS
DIASTOLIC BLOOD PRESSURE: 58 MMHG | HEART RATE: 72 BPM | RESPIRATION RATE: 18 BRPM | TEMPERATURE: 97.6 F | OXYGEN SATURATION: 93 % | SYSTOLIC BLOOD PRESSURE: 127 MMHG

## 2024-08-19 DIAGNOSIS — G35 MULTIPLE SCLEROSIS, RELAPSING-REMITTING (HCC): Primary | ICD-10-CM

## 2024-08-19 PROCEDURE — 6360000002 HC RX W HCPCS: Performed by: NURSE PRACTITIONER

## 2024-08-19 PROCEDURE — 96365 THER/PROPH/DIAG IV INF INIT: CPT

## 2024-08-19 PROCEDURE — 96366 THER/PROPH/DIAG IV INF ADDON: CPT

## 2024-08-19 PROCEDURE — 6370000000 HC RX 637 (ALT 250 FOR IP): Performed by: NURSE PRACTITIONER

## 2024-08-19 PROCEDURE — 2580000003 HC RX 258: Performed by: NURSE PRACTITIONER

## 2024-08-19 RX ORDER — SODIUM CHLORIDE 9 MG/ML
INJECTION, SOLUTION INTRAVENOUS CONTINUOUS
OUTPATIENT
Start: 2025-02-09

## 2024-08-19 RX ORDER — EPINEPHRINE 1 MG/ML
0.3 INJECTION, SOLUTION INTRAMUSCULAR; SUBCUTANEOUS PRN
OUTPATIENT
Start: 2025-02-09

## 2024-08-19 RX ORDER — DIPHENHYDRAMINE HYDROCHLORIDE 50 MG/ML
50 INJECTION INTRAMUSCULAR; INTRAVENOUS ONCE
Status: COMPLETED | OUTPATIENT
Start: 2024-08-19 | End: 2024-08-19

## 2024-08-19 RX ORDER — ONDANSETRON 2 MG/ML
8 INJECTION INTRAMUSCULAR; INTRAVENOUS
OUTPATIENT
Start: 2025-02-09

## 2024-08-19 RX ORDER — SODIUM CHLORIDE 9 MG/ML
5-250 INJECTION, SOLUTION INTRAVENOUS PRN
Status: DISCONTINUED | OUTPATIENT
Start: 2024-08-19 | End: 2024-08-20 | Stop reason: HOSPADM

## 2024-08-19 RX ORDER — DIPHENHYDRAMINE HYDROCHLORIDE 50 MG/ML
50 INJECTION INTRAMUSCULAR; INTRAVENOUS
OUTPATIENT
Start: 2025-02-09

## 2024-08-19 RX ORDER — SODIUM CHLORIDE 9 MG/ML
5-250 INJECTION, SOLUTION INTRAVENOUS PRN
OUTPATIENT
Start: 2025-02-09

## 2024-08-19 RX ORDER — DIPHENHYDRAMINE HYDROCHLORIDE 50 MG/ML
50 INJECTION INTRAMUSCULAR; INTRAVENOUS ONCE
OUTPATIENT
Start: 2025-02-09 | End: 2025-02-09

## 2024-08-19 RX ORDER — ACETAMINOPHEN 325 MG/1
650 TABLET ORAL
OUTPATIENT
Start: 2025-02-09

## 2024-08-19 RX ORDER — HEPARIN 100 UNIT/ML
500 SYRINGE INTRAVENOUS PRN
OUTPATIENT
Start: 2025-02-09

## 2024-08-19 RX ORDER — ALBUTEROL SULFATE 90 UG/1
4 AEROSOL, METERED RESPIRATORY (INHALATION) PRN
OUTPATIENT
Start: 2025-02-09

## 2024-08-19 RX ORDER — ACETAMINOPHEN 325 MG/1
650 TABLET ORAL ONCE
Status: COMPLETED | OUTPATIENT
Start: 2024-08-19 | End: 2024-08-19

## 2024-08-19 RX ORDER — ACETAMINOPHEN 325 MG/1
650 TABLET ORAL ONCE
OUTPATIENT
Start: 2025-02-09 | End: 2025-02-09

## 2024-08-19 RX ORDER — SODIUM CHLORIDE 0.9 % (FLUSH) 0.9 %
5-40 SYRINGE (ML) INJECTION PRN
OUTPATIENT
Start: 2025-02-09

## 2024-08-19 RX ADMIN — ACETAMINOPHEN 650 MG: 325 TABLET ORAL at 08:13

## 2024-08-19 RX ADMIN — WATER 125 MG: 1 INJECTION INTRAMUSCULAR; INTRAVENOUS; SUBCUTANEOUS at 08:15

## 2024-08-19 RX ADMIN — SODIUM CHLORIDE 40 ML/HR: 9 INJECTION, SOLUTION INTRAVENOUS at 08:10

## 2024-08-19 RX ADMIN — DIPHENHYDRAMINE HYDROCHLORIDE 50 MG: 50 INJECTION INTRAMUSCULAR; INTRAVENOUS at 08:18

## 2024-08-19 RX ADMIN — OCRELIZUMAB 600 MG: 300 INJECTION INTRAVENOUS at 08:59

## 2024-08-19 NOTE — PROGRESS NOTES
0750 pt arrives ambulatory for ocrevus infusion. Infusion explained and questions answered.PT RIGHTS AND RESPONSIBILITIES OFFERED TO PT.  0850 pt resting quietly. Denies needs.   1000 infusion continues. Pt ambulating to restroom.   1115 ocrevus infusion complete. Pt tolerated it well with no complaints.   1215 pt discharged ambulatory with cane with instructions with no complaints.                        __m__ Safety:       (Environmental)  [place] to environment  Ensure ID band is correct and in place/ allergy band as needed  Assess for fall risk  Initiate fall precautions as applicable (fall band, side rails, etc.)  Call light within reach  Bed in low position/ wheels locked    _m___ Pain:       Assess pain level and characteristics  Administer analgesics as ordered  Assess effectiveness of pain management and report to MD as needed    __m__ Knowledge Deficit:  Assess baseline knowledge  Provide teaching at level of understanding  Provide teaching via preferred learning method  Evaluate teaching effectiveness    _m___ Hemodynamic/Respiratory Status:       (Pre and Post Procedure Monitoring)  Assess/Monitor vital signs and LOC  Assess Baseline SpO2 prior to any sedation  Obtain weight/height  Assess vital signs/ LOC until patient meets discharge criteria  Monitor procedure site and notify MD of any issues

## 2024-08-19 NOTE — DISCHARGE INSTRUCTIONS
Ocrevus Discharge Instructions    Can not have an active infection or be on an antibiotic at time of infusion.     Side effects: headache, fatigue, skin itchiness.     Report any unusual diarrhea or severe abdominal pain to your neurologist.     Report any new or worsening neurological signs that have lasted several days such as: problems with thinking, eyesight, strength, and balance.     Next Ocrevus infusion: Wednesday February 19th, 2025 at 9:00am    Please call [phone] with any questions or concerns.

## 2024-10-22 ENCOUNTER — HOSPITAL ENCOUNTER (OUTPATIENT)
Dept: ULTRASOUND IMAGING | Age: 64
Discharge: HOME | End: 2024-10-22
Payer: MEDICARE

## 2024-10-22 DIAGNOSIS — N13.2 HYDRONEPHROSIS CONCURRENT WITH AND DUE TO CALCULI OF KIDNEY AND URETER: ICD-10-CM

## 2024-10-22 PROCEDURE — 76770 US EXAM ABDO BACK WALL COMP: CPT

## 2024-10-24 ENCOUNTER — HOSPITAL ENCOUNTER (OUTPATIENT)
Dept: GENERAL RADIOLOGY | Age: 64
Discharge: HOME | End: 2024-10-24
Payer: MEDICARE

## 2024-10-24 ENCOUNTER — HOSPITAL ENCOUNTER (OUTPATIENT)
Age: 64
Discharge: HOME | End: 2024-10-24
Payer: MEDICARE

## 2024-10-24 DIAGNOSIS — N20.1 RIGHT URETERAL STONE: ICD-10-CM

## 2024-10-24 PROCEDURE — 74018 RADEX ABDOMEN 1 VIEW: CPT

## 2024-10-29 ENCOUNTER — OFFICE VISIT (OUTPATIENT)
Dept: UROLOGY | Age: 64
End: 2024-10-29
Payer: MEDICARE

## 2024-10-29 VITALS — RESPIRATION RATE: 16 BRPM | BODY MASS INDEX: 30.75 KG/M2 | WEIGHT: 232 LBS | HEIGHT: 73 IN

## 2024-10-29 DIAGNOSIS — N40.1 BPH WITH OBSTRUCTION/LOWER URINARY TRACT SYMPTOMS: ICD-10-CM

## 2024-10-29 DIAGNOSIS — N13.8 BPH WITH OBSTRUCTION/LOWER URINARY TRACT SYMPTOMS: ICD-10-CM

## 2024-10-29 DIAGNOSIS — R10.9 RIGHT FLANK PAIN: ICD-10-CM

## 2024-10-29 DIAGNOSIS — N28.1 RENAL CYST, RIGHT: ICD-10-CM

## 2024-10-29 DIAGNOSIS — N13.2 HYDRONEPHROSIS CONCURRENT WITH AND DUE TO CALCULI OF KIDNEY AND URETER: ICD-10-CM

## 2024-10-29 DIAGNOSIS — D35.02 BILATERAL ADRENAL ADENOMAS: ICD-10-CM

## 2024-10-29 DIAGNOSIS — R93.422 ABNORMAL ULTRASOUND OF LEFT KIDNEY: ICD-10-CM

## 2024-10-29 DIAGNOSIS — N20.1 RIGHT URETERAL STONE: Primary | ICD-10-CM

## 2024-10-29 DIAGNOSIS — D35.01 BILATERAL ADRENAL ADENOMAS: ICD-10-CM

## 2024-10-29 DIAGNOSIS — N21.0 BLADDER STONE: ICD-10-CM

## 2024-10-29 LAB — POST VOID RESIDUAL (PVR): 163 ML

## 2024-10-29 PROCEDURE — G8427 DOCREV CUR MEDS BY ELIG CLIN: HCPCS | Performed by: NURSE PRACTITIONER

## 2024-10-29 PROCEDURE — G8417 CALC BMI ABV UP PARAM F/U: HCPCS | Performed by: NURSE PRACTITIONER

## 2024-10-29 PROCEDURE — 1036F TOBACCO NON-USER: CPT | Performed by: NURSE PRACTITIONER

## 2024-10-29 PROCEDURE — 3017F COLORECTAL CA SCREEN DOC REV: CPT | Performed by: NURSE PRACTITIONER

## 2024-10-29 PROCEDURE — 51798 US URINE CAPACITY MEASURE: CPT | Performed by: NURSE PRACTITIONER

## 2024-10-29 PROCEDURE — G8484 FLU IMMUNIZE NO ADMIN: HCPCS | Performed by: NURSE PRACTITIONER

## 2024-10-29 PROCEDURE — 99214 OFFICE O/P EST MOD 30 MIN: CPT | Performed by: NURSE PRACTITIONER

## 2024-10-29 RX ORDER — TAMSULOSIN HYDROCHLORIDE 0.4 MG/1
0.4 CAPSULE ORAL DAILY
Qty: 90 CAPSULE | Refills: 3 | Status: SHIPPED | OUTPATIENT
Start: 2024-10-29

## 2024-10-29 RX ORDER — ASPIRIN 81 MG/1
81 TABLET ORAL DAILY
COMMUNITY

## 2024-10-29 RX ORDER — OXYBUTYNIN CHLORIDE 5 MG/1
5 TABLET, EXTENDED RELEASE ORAL DAILY
Qty: 90 TABLET | Refills: 0 | Status: SHIPPED | OUTPATIENT
Start: 2024-10-29 | End: 2025-10-29

## 2024-10-29 ASSESSMENT — ENCOUNTER SYMPTOMS
ABDOMINAL PAIN: 0
SHORTNESS OF BREATH: 0
COUGH: 0

## 2024-10-29 NOTE — PATIENT INSTRUCTIONS
Continue flomax 0.4mg daily  Start oxybutynin 5mg daily  MRI adrenal and kidneys  Follow up in 4 weeks to review symptoms and MRI and do pvr    [employer] 24 hour urine collection, call [phone]

## 2024-10-29 NOTE — PROGRESS NOTES
[facility] [place] [facility]  [facility] UROLOGY  [address]  [address]  [facility] [address]  Dept: [phone]  Loc: [phone]    Visit Date: 10/29/2024        HPI:   The patient is a 63 y.o. male who presents today for evaluation of the following problem(s): hx right ureteral stone with hydro. Right flank pain, bladder stone, renal cyst, bph.     Seen at [facility] ER 5/28/24 due to  flank pain on right that had been ongoing for 7 months. No fevers, chills.  Urine cx no growth prelim.  UA small leuk. WBC 15-25.  RBC 5-10. Cre 0.6. WBC 12.0.  Given flomax, toradol, levaquin and zofran in ER. CT scan without 5/28/24.  Severe right-sided hydronephrosis and hydroureter secondary to a 12 x 16 mm obstructing calculus at the right ureteropelvic junction is observed. A 3 mm nonobstructing calculus in the midpole of the right kidney was identified. A 1 mm nonobstructing calculus in the midpole of the right kidney is observed. A 4 cm simple cyst in the midpole of the right kidney is visualized. A 2 mm nonobstructing calculus in the lower pole of the left kidney is observed. A calculus along the right posterior wall of the urinary bladder measures 10 mm.       Seen 5/29/24 in consult. No dysuria, no gross hematuria. Onset right sided flank pain since Nov 2023.  Little worse.  Was a 2 and now a 4/10 over past 2 months. Lower urinary tract symptoms: weak stream for years, urgency later in day but now new.  No fevers, chills, abdominal pain, dysuria, hematuria. IPSS 10/3.       S/p cystoscopy, right ureteroscopy right holmium laser lithotripsy  right stent placement 6/4/24 with Dr burleson. Needs second procedure.     S/p Cystoscopy Right Ureteroscopy Laser Lithotripsy,Right Ureteral Stent Exchange, Cystolitholapaxy 7/2/24 with Dr Burleson. Stones treated and large bladder stone treated.     Seen 7/24/24. No hematuria, no dysuria.  No abdominal pain.  No flank pain.  Pain on spine only.  Has some frequency.

## 2024-10-31 ENCOUNTER — TELEPHONE (OUTPATIENT)
Dept: UROLOGY | Age: 64
End: 2024-10-31

## 2024-10-31 NOTE — TELEPHONE ENCOUNTER
Patient scheduled for MRI ABDOMEN W WO  at [facility] on 11/26/24.  Arrival of 730 AM for a 8 AM scan time.  Order mailed with instructions or given to the patient in the office

## 2024-11-01 ENCOUNTER — HOSPITAL ENCOUNTER (OUTPATIENT)
Age: 64
Discharge: HOME | End: 2024-11-01
Payer: MEDICARE

## 2024-11-01 DIAGNOSIS — M62.838 MUSCLE SPASMS OF BOTH LOWER EXTREMITIES: ICD-10-CM

## 2024-11-01 DIAGNOSIS — R29.898 LEFT LEG WEAKNESS: ICD-10-CM

## 2024-11-01 DIAGNOSIS — G35 MULTIPLE SCLEROSIS (HCC): ICD-10-CM

## 2024-11-01 LAB
ALBUMIN SERPL BCG-MCNC: 4.3 G/DL (ref 3.5–5.1)
ALP SERPL-CCNC: 93 U/L (ref 38–126)
ALT SERPL W/O P-5'-P-CCNC: 19 U/L (ref 11–66)
ANION GAP SERPL CALC-SCNC: 16 MEQ/L (ref 8–16)
AST SERPL-CCNC: 16 U/L (ref 5–40)
BASOPHILS ABSOLUTE: 0.1 THOU/MM3 (ref 0–0.1)
BASOPHILS NFR BLD AUTO: 0.5 %
BILIRUB SERPL-MCNC: 0.3 MG/DL (ref 0.3–1.2)
BUN SERPL-MCNC: 13 MG/DL (ref 7–22)
CALCIUM SERPL-MCNC: 9.1 MG/DL (ref 8.5–10.5)
CHLORIDE SERPL-SCNC: 99 MEQ/L (ref 98–111)
CO2 SERPL-SCNC: 24 MEQ/L (ref 23–33)
CREAT SERPL-MCNC: 0.6 MG/DL (ref 0.4–1.2)
DEPRECATED RDW RBC AUTO: 44.4 FL (ref 35–45)
EOSINOPHIL NFR BLD AUTO: 1.5 %
EOSINOPHILS ABSOLUTE: 0.3 THOU/MM3 (ref 0–0.4)
ERYTHROCYTE [DISTWIDTH] IN BLOOD BY AUTOMATED COUNT: 13 % (ref 11.5–14.5)
GFR SERPL CREATININE-BSD FRML MDRD: > 90 ML/MIN/1.73M2
GLUCOSE SERPL-MCNC: 157 MG/DL (ref 70–108)
HCT VFR BLD AUTO: 47.6 % (ref 42–52)
HGB BLD-MCNC: 15.4 GM/DL (ref 14–18)
IMM GRANULOCYTES # BLD AUTO: 0.07 THOU/MM3 (ref 0–0.07)
IMM GRANULOCYTES NFR BLD AUTO: 0.4 %
LYMPHOCYTES ABSOLUTE: 1.5 THOU/MM3 (ref 1–4.8)
LYMPHOCYTES NFR BLD AUTO: 8.7 %
MCH RBC QN AUTO: 29.8 PG (ref 26–33)
MCHC RBC AUTO-ENTMCNC: 32.4 GM/DL (ref 32.2–35.5)
MCV RBC AUTO: 92.2 FL (ref 80–94)
MONOCYTES ABSOLUTE: 1.3 THOU/MM3 (ref 0.4–1.3)
MONOCYTES NFR BLD AUTO: 7.5 %
NEUTROPHILS ABSOLUTE: 14.4 THOU/MM3 (ref 1.8–7.7)
NEUTROPHILS NFR BLD AUTO: 81.4 %
NRBC BLD AUTO-RTO: 0 /100 WBC
PLATELET # BLD AUTO: 333 THOU/MM3 (ref 130–400)
PMV BLD AUTO: 8.9 FL (ref 9.4–12.4)
POTASSIUM SERPL-SCNC: 4.5 MEQ/L (ref 3.5–5.2)
PROT SERPL-MCNC: 7.2 G/DL (ref 6.1–8)
RBC # BLD AUTO: 5.16 MILL/MM3 (ref 4.7–6.1)
SODIUM SERPL-SCNC: 139 MEQ/L (ref 135–145)
WBC # BLD AUTO: 17.7 THOU/MM3 (ref 4.8–10.8)

## 2024-11-01 PROCEDURE — 36415 COLL VENOUS BLD VENIPUNCTURE: CPT

## 2024-11-01 PROCEDURE — 85025 COMPLETE CBC W/AUTO DIFF WBC: CPT

## 2024-11-01 PROCEDURE — 80053 COMPREHEN METABOLIC PANEL: CPT

## 2024-11-04 ENCOUNTER — OFFICE VISIT (OUTPATIENT)
Dept: NEUROLOGY | Age: 64
End: 2024-11-04
Payer: MEDICARE

## 2024-11-04 VITALS
SYSTOLIC BLOOD PRESSURE: 122 MMHG | HEIGHT: 73 IN | BODY MASS INDEX: 30.85 KG/M2 | OXYGEN SATURATION: 96 % | DIASTOLIC BLOOD PRESSURE: 70 MMHG | HEART RATE: 90 BPM | WEIGHT: 232.8 LBS

## 2024-11-04 DIAGNOSIS — R29.898 LEFT LEG WEAKNESS: ICD-10-CM

## 2024-11-04 DIAGNOSIS — G35 MULTIPLE SCLEROSIS (HCC): Primary | ICD-10-CM

## 2024-11-04 DIAGNOSIS — M62.838 MUSCLE SPASMS OF BOTH LOWER EXTREMITIES: ICD-10-CM

## 2024-11-04 PROCEDURE — 3074F SYST BP LT 130 MM HG: CPT | Performed by: PSYCHIATRY & NEUROLOGY

## 2024-11-04 PROCEDURE — 99214 OFFICE O/P EST MOD 30 MIN: CPT | Performed by: PSYCHIATRY & NEUROLOGY

## 2024-11-04 PROCEDURE — G8417 CALC BMI ABV UP PARAM F/U: HCPCS | Performed by: PSYCHIATRY & NEUROLOGY

## 2024-11-04 PROCEDURE — 3017F COLORECTAL CA SCREEN DOC REV: CPT | Performed by: PSYCHIATRY & NEUROLOGY

## 2024-11-04 PROCEDURE — 1036F TOBACCO NON-USER: CPT | Performed by: PSYCHIATRY & NEUROLOGY

## 2024-11-04 PROCEDURE — G8427 DOCREV CUR MEDS BY ELIG CLIN: HCPCS | Performed by: PSYCHIATRY & NEUROLOGY

## 2024-11-04 PROCEDURE — 3078F DIAST BP <80 MM HG: CPT | Performed by: PSYCHIATRY & NEUROLOGY

## 2024-11-04 PROCEDURE — G8484 FLU IMMUNIZE NO ADMIN: HCPCS | Performed by: PSYCHIATRY & NEUROLOGY

## 2024-11-04 RX ORDER — BACLOFEN 20 MG/1
20 TABLET ORAL 2 TIMES DAILY
Qty: 60 TABLET | Refills: 5 | Status: SHIPPED | OUTPATIENT
Start: 2024-11-04

## 2024-11-04 NOTE — PATIENT INSTRUCTIONS
Ordered today, IgA, IgG, IgM.   MRI Brain with and without contrast.  Multiple sclerosis white matter abnormality on Ocrevus follow-up study   Continue with Ocrevus infusions  Continue Baclofen to 20 mg twice a day. Refills given.   Take calcium and Vitamin D daily.   Stay active   Follow up in 6 months or sooner if needed.

## 2024-11-04 NOTE — PROGRESS NOTES
NEUROLOGY OUT PATIENT FOLLOW UP NOTE:  11/4/202412:17 PM    Gaston Peña is here for follow up for multiple sclerosis. He is here to go over plan.         Allergies   Allergen Reactions    Pcn [Penicillins]      Told from childhood not to take due to parents and siblings are allergic       Current Outpatient Medications:     aspirin 81 MG EC tablet, Take 1 tablet by mouth daily, Disp: , Rfl:     tamsulosin (FLOMAX) 0.4 MG capsule, Take 1 capsule by mouth daily, Disp: 90 capsule, Rfl: 3    oxyBUTYnin (DITROPAN XL) 5 MG extended release tablet, Take 1 tablet by mouth daily, Disp: 90 tablet, Rfl: 0    baclofen (LIORESAL) 20 MG tablet, Take 1 tablet by mouth twice daily, Disp: 60 tablet, Rfl: 1    metoprolol succinate (TOPROL XL) 50 MG extended release tablet, Take 1 tablet by mouth daily, Disp: , Rfl:     lidocaine (LIDODERM) 5 %, 1 patch as needed, Disp: , Rfl:     ketorolac (TORADOL) 10 MG tablet, Take 1 tablet by mouth every 6 hours as needed for Pain, Disp: 20 tablet, Rfl: 0    budesonide-formoterol (SYMBICORT) 160-4.5 MCG/ACT AERO, Inhale 2 puffs into the lungs 2 times daily, Disp: , Rfl:     umeclidinium bromide (INCRUSE ELLIPTA) 62.5 MCG/ACT inhaler, Inhale 1 puff into the lungs daily, Disp: 90 each, Rfl: 3    mometasone-formoterol (DULERA) 200-5 MCG/ACT inhaler, Inhale 2 puffs into the lungs in the morning and 2 puffs in the evening., Disp: 13 g, Rfl: 11    ocrelizumab (OCREVUS) 300 MG/10ML SOLN injection, Infuse 20 mLs intravenously every 6 months Deliver to: [facility] ATTN: Pharmacy [address] [place] [address] Ph. [phone], Disp: 20 mL, Rfl: 1    albuterol sulfate HFA (VENTOLIN HFA) 108 (90 Base) MCG/ACT inhaler, Inhale 2 puffs into the lungs 4 times daily as needed for Wheezing, Disp: 18 g, Rfl: 5    potassium chloride (KLOR-CON) 20 MEQ packet, Take 20 mEq by mouth daily, Disp: , Rfl:     losartan (COZAAR) 100 MG tablet, Take 1 tablet by mouth daily, Disp: , Rfl:     modafinil (PROVIGIL) 200 MG tablet, 1

## 2024-11-07 ENCOUNTER — HOSPITAL ENCOUNTER (OUTPATIENT)
Dept: MRI IMAGING | Age: 64
Discharge: HOME | End: 2024-11-07
Attending: PSYCHIATRY & NEUROLOGY
Payer: MEDICARE

## 2024-11-07 DIAGNOSIS — G35 MULTIPLE SCLEROSIS (HCC): ICD-10-CM

## 2024-11-07 DIAGNOSIS — M62.838 MUSCLE SPASMS OF BOTH LOWER EXTREMITIES: ICD-10-CM

## 2024-11-07 PROCEDURE — 70553 MRI BRAIN STEM W/O & W/DYE: CPT

## 2024-11-07 PROCEDURE — A9579 GAD-BASE MR CONTRAST NOS,1ML: HCPCS | Performed by: PSYCHIATRY & NEUROLOGY

## 2024-11-07 PROCEDURE — 6360000004 HC RX CONTRAST MEDICATION: Performed by: PSYCHIATRY & NEUROLOGY

## 2024-11-07 RX ADMIN — GADOTERIDOL 20 ML: 279.3 INJECTION, SOLUTION INTRAVENOUS at 18:25

## 2024-11-08 ENCOUNTER — TELEPHONE (OUTPATIENT)
Dept: NEUROLOGY | Age: 64
End: 2024-11-08

## 2024-11-08 NOTE — RESULT ENCOUNTER NOTE
Please let patient know MRI Brain is stable compared to prior study in 2022.  Marycarmen Sagastume MD 11:33 AM

## 2024-11-08 NOTE — TELEPHONE ENCOUNTER
----- Message from Dr. Marycarmen Sagastume MD sent at 11/8/2024 11:34 AM EST -----  Please let patient know MRI Brain is stable compared to prior study in 2022.  Marycarmen Sagastume MD 11:33 AM

## 2024-11-12 DIAGNOSIS — J44.9 STAGE 2 MODERATE COPD BY GOLD CLASSIFICATION (HCC): ICD-10-CM

## 2024-11-14 RX ORDER — UMECLIDINIUM 62.5 UG/1
1 AEROSOL, POWDER ORAL DAILY
Qty: 90 EACH | Refills: 0 | Status: SHIPPED | OUTPATIENT
Start: 2024-11-14

## 2024-11-26 ENCOUNTER — HOSPITAL ENCOUNTER (OUTPATIENT)
Dept: MRI IMAGING | Age: 64
Discharge: HOME | End: 2024-11-26
Payer: MEDICARE

## 2024-11-26 DIAGNOSIS — D35.02 BILATERAL ADRENAL ADENOMAS: ICD-10-CM

## 2024-11-26 DIAGNOSIS — D35.01 BILATERAL ADRENAL ADENOMAS: ICD-10-CM

## 2024-11-26 DIAGNOSIS — R93.422 ABNORMAL ULTRASOUND OF LEFT KIDNEY: ICD-10-CM

## 2024-11-26 PROCEDURE — 6360000004 HC RX CONTRAST MEDICATION: Performed by: NURSE PRACTITIONER

## 2024-11-26 PROCEDURE — 74183 MRI ABD W/O CNTR FLWD CNTR: CPT

## 2024-11-26 PROCEDURE — A9579 GAD-BASE MR CONTRAST NOS,1ML: HCPCS | Performed by: NURSE PRACTITIONER

## 2024-11-26 RX ADMIN — GADOTERIDOL 20 ML: 279.3 INJECTION, SOLUTION INTRAVENOUS at 08:38

## 2024-12-03 ENCOUNTER — OFFICE VISIT (OUTPATIENT)
Dept: UROLOGY | Age: 64
End: 2024-12-03

## 2024-12-03 VITALS
HEART RATE: 75 BPM | OXYGEN SATURATION: 95 % | RESPIRATION RATE: 18 BRPM | BODY MASS INDEX: 29.65 KG/M2 | HEIGHT: 74 IN | TEMPERATURE: 98.7 F | WEIGHT: 231 LBS

## 2024-12-03 DIAGNOSIS — N13.8 BPH WITH OBSTRUCTION/LOWER URINARY TRACT SYMPTOMS: ICD-10-CM

## 2024-12-03 DIAGNOSIS — R82.994 HYPERCALCIURIA: Primary | ICD-10-CM

## 2024-12-03 DIAGNOSIS — N40.1 BPH WITH OBSTRUCTION/LOWER URINARY TRACT SYMPTOMS: ICD-10-CM

## 2024-12-03 DIAGNOSIS — N20.1 RIGHT URETERAL STONE: ICD-10-CM

## 2024-12-03 LAB — POST VOID RESIDUAL (PVR): 98 ML

## 2024-12-03 NOTE — PROGRESS NOTES
[facility] [place] [facility]  [facility] UROLOGY  [address]  [address]  [facility] [address]  Dept: [phone]  Loc: [phone]    Visit Date: 12/3/2024        HPI:   The patient is a 64 y.o. male who presents today for evaluation of the following problem(s): hx right ureteral stone with hydro. Right flank pain, bladder stone, renal cyst, bph.     Seen at [facility] ER 5/28/24 due to  flank pain on right that had been ongoing for 7 months. No fevers, chills.  Urine cx no growth prelim.  UA small leuk. WBC 15-25.  RBC 5-10. Cre 0.6. WBC 12.0.  Given flomax, toradol, levaquin and zofran in ER. CT scan without 5/28/24.  Severe right-sided hydronephrosis and hydroureter secondary to a 12 x 16 mm obstructing calculus at the right ureteropelvic junction is observed. A 3 mm nonobstructing calculus in the midpole of the right kidney was identified. A 1 mm nonobstructing calculus in the midpole of the right kidney is observed. A 4 cm simple cyst in the midpole of the right kidney is visualized. A 2 mm nonobstructing calculus in the lower pole of the left kidney is observed. A calculus along the right posterior wall of the urinary bladder measures 10 mm.       Seen 5/29/24 in consult. No dysuria, no gross hematuria. Onset right sided flank pain since Nov 2023.  Little worse.  Was a 2 and now a 4/10 over past 2 months. Lower urinary tract symptoms: weak stream for years, urgency later in day but now new.  No fevers, chills, abdominal pain, dysuria, hematuria. IPSS 10/3.       S/p cystoscopy, right ureteroscopy right holmium laser lithotripsy  right stent placement 6/4/24 with Dr burleson. Needs second procedure.     S/p Cystoscopy Right Ureteroscopy Laser Lithotripsy,Right Ureteral Stent Exchange, Cystolitholapaxy 7/2/24 with Dr Burleson. Stones treated and large bladder stone treated.     Seen 7/24/24. No hematuria, no dysuria.  No abdominal pain.  No flank pain.  Pain on spine only.  Has some frequency.  No

## 2025-01-28 DIAGNOSIS — G35 MULTIPLE SCLEROSIS, RELAPSING-REMITTING (HCC): ICD-10-CM

## 2025-01-28 DIAGNOSIS — G35 MULTIPLE SCLEROSIS (HCC): ICD-10-CM

## 2025-01-28 RX ORDER — OCRELIZUMAB 300 MG/10ML
600 INJECTION INTRAVENOUS
Qty: 20 ML | Refills: 1 | Status: ACTIVE | OUTPATIENT
Start: 2025-01-28

## 2025-01-28 NOTE — TELEPHONE ENCOUNTER
Patient Gaston Peña is enrolled in [employer] and receives Ocrevus at no cost.  [facility]  is requesting a new Ocrevus script be sent to:  [facility]  [address] [address]    Please approve or deny     Last Visit Date:  11/4/2024       Next Visit Date:    5/5/2025 Paige Leopold CNP

## 2025-02-04 DIAGNOSIS — G35 MULTIPLE SCLEROSIS, RELAPSING-REMITTING (HCC): Primary | ICD-10-CM

## 2025-02-04 RX ORDER — ACETAMINOPHEN 325 MG/1
650 TABLET ORAL
OUTPATIENT
Start: 2025-02-04

## 2025-02-04 RX ORDER — HEPARIN SODIUM (PORCINE) LOCK FLUSH IV SOLN 100 UNIT/ML 100 UNIT/ML
500 SOLUTION INTRAVENOUS PRN
OUTPATIENT
Start: 2025-02-04

## 2025-02-04 RX ORDER — EPINEPHRINE 1 MG/ML
0.3 INJECTION, SOLUTION, CONCENTRATE INTRAVENOUS PRN
OUTPATIENT
Start: 2025-02-04

## 2025-02-04 RX ORDER — FAMOTIDINE 10 MG/ML
20 INJECTION, SOLUTION INTRAVENOUS
OUTPATIENT
Start: 2025-02-04

## 2025-02-04 RX ORDER — DIPHENHYDRAMINE HYDROCHLORIDE 50 MG/ML
50 INJECTION INTRAMUSCULAR; INTRAVENOUS
OUTPATIENT
Start: 2025-02-04

## 2025-02-04 RX ORDER — SODIUM CHLORIDE 9 MG/ML
5-250 INJECTION, SOLUTION INTRAVENOUS PRN
OUTPATIENT
Start: 2025-02-04

## 2025-02-04 RX ORDER — SODIUM CHLORIDE 0.9 % (FLUSH) 0.9 %
5-40 SYRINGE (ML) INJECTION PRN
OUTPATIENT
Start: 2025-02-04

## 2025-02-04 RX ORDER — ALBUTEROL SULFATE 90 UG/1
4 INHALANT RESPIRATORY (INHALATION) PRN
OUTPATIENT
Start: 2025-02-04

## 2025-02-04 RX ORDER — SODIUM CHLORIDE 9 MG/ML
INJECTION, SOLUTION INTRAVENOUS CONTINUOUS
OUTPATIENT
Start: 2025-02-04

## 2025-02-04 RX ORDER — ACETAMINOPHEN 325 MG/1
650 TABLET ORAL ONCE
OUTPATIENT
Start: 2025-02-04 | End: 2025-02-04

## 2025-02-04 RX ORDER — DIPHENHYDRAMINE HYDROCHLORIDE 50 MG/ML
50 INJECTION INTRAMUSCULAR; INTRAVENOUS ONCE
OUTPATIENT
Start: 2025-02-04 | End: 2025-02-04

## 2025-02-04 RX ORDER — HYDROCORTISONE SODIUM SUCCINATE 100 MG/2ML
100 INJECTION INTRAMUSCULAR; INTRAVENOUS
OUTPATIENT
Start: 2025-02-04

## 2025-02-04 RX ORDER — ONDANSETRON 2 MG/ML
8 INJECTION INTRAMUSCULAR; INTRAVENOUS
OUTPATIENT
Start: 2025-02-04

## 2025-02-06 ENCOUNTER — TELEPHONE (OUTPATIENT)
Dept: PULMONOLOGY | Age: 65
End: 2025-02-06

## 2025-02-06 DIAGNOSIS — J44.9 STAGE 2 MODERATE COPD BY GOLD CLASSIFICATION (HCC): Primary | ICD-10-CM

## 2025-02-06 NOTE — TELEPHONE ENCOUNTER
Received a non-formulary notification from [facility] stating that Dulera is no longer covered for the patient. I spoke with pharmacy to obtain drug coverage details & to see what current cost of inhaler is. [facility] states that next refill is ringing up for $274.  -  I completed & printed formulary exception request form from [facility]'s website. I placed the form on Nyla's desk for signature.   -  Once I receive the signed form back I will fax it to [facility] along with last 2 office notes.  -  DRUG COVERAGE INFO. PER PHARMACY:  ID [id number]  BIN: [id number]  PCN: [id number]  GROUP: [id number]

## 2025-02-13 NOTE — TELEPHONE ENCOUNTER
Faxed signed formulary exception forms & last few office notes to [facility] for DULERA.  -  I will update this encounter once I receive a determination.

## 2025-02-14 ENCOUNTER — HOSPITAL ENCOUNTER (OUTPATIENT)
Dept: GENERAL RADIOLOGY | Age: 65
Discharge: HOME | End: 2025-02-14
Payer: MEDICARE

## 2025-02-14 ENCOUNTER — HOSPITAL ENCOUNTER (OUTPATIENT)
Age: 65
Discharge: HOME | End: 2025-02-14
Payer: MEDICARE

## 2025-02-14 DIAGNOSIS — M25.521 RIGHT ELBOW PAIN: ICD-10-CM

## 2025-02-14 PROCEDURE — 73080 X-RAY EXAM OF ELBOW: CPT

## 2025-02-18 RX ORDER — FLUTICASONE FUROATE AND VILANTEROL TRIFENATATE 50; 25 UG/1; UG/1
1 POWDER RESPIRATORY (INHALATION) DAILY
Qty: 60 EACH | Refills: 11 | Status: SHIPPED | OUTPATIENT
Start: 2025-02-18

## 2025-02-18 NOTE — TELEPHONE ENCOUNTER
Appeal for DULERA has been denied.  -  Denial reason : COPD is not an FDA approved diagnosis for Dulera  -  Formulary alternatives : None listed  -  Please consider prescribing an alternative medication. Thanks!

## 2025-02-19 ENCOUNTER — HOSPITAL ENCOUNTER (OUTPATIENT)
Dept: NURSING | Age: 65
Discharge: HOME | End: 2025-02-19
Payer: MEDICARE

## 2025-02-19 VITALS
SYSTOLIC BLOOD PRESSURE: 131 MMHG | DIASTOLIC BLOOD PRESSURE: 80 MMHG | BODY MASS INDEX: 29.77 KG/M2 | TEMPERATURE: 97.6 F | WEIGHT: 232 LBS | RESPIRATION RATE: 16 BRPM | OXYGEN SATURATION: 93 % | HEART RATE: 81 BPM

## 2025-02-19 DIAGNOSIS — G35 MULTIPLE SCLEROSIS, RELAPSING-REMITTING (HCC): Primary | ICD-10-CM

## 2025-02-19 PROCEDURE — 99211 OFF/OP EST MAY X REQ PHY/QHP: CPT

## 2025-02-19 PROCEDURE — 2500000003 HC RX 250 WO HCPCS: Performed by: PSYCHIATRY & NEUROLOGY

## 2025-02-19 PROCEDURE — 2580000003 HC RX 258: Performed by: PSYCHIATRY & NEUROLOGY

## 2025-02-19 PROCEDURE — 96375 TX/PRO/DX INJ NEW DRUG ADDON: CPT

## 2025-02-19 PROCEDURE — 96365 THER/PROPH/DIAG IV INF INIT: CPT

## 2025-02-19 PROCEDURE — 96366 THER/PROPH/DIAG IV INF ADDON: CPT

## 2025-02-19 PROCEDURE — 6370000000 HC RX 637 (ALT 250 FOR IP): Performed by: PSYCHIATRY & NEUROLOGY

## 2025-02-19 PROCEDURE — 6360000002 HC RX W HCPCS: Performed by: PSYCHIATRY & NEUROLOGY

## 2025-02-19 RX ORDER — DIPHENHYDRAMINE HYDROCHLORIDE 50 MG/ML
50 INJECTION INTRAMUSCULAR; INTRAVENOUS
OUTPATIENT
Start: 2025-08-17

## 2025-02-19 RX ORDER — DIPHENHYDRAMINE HYDROCHLORIDE 50 MG/ML
50 INJECTION INTRAMUSCULAR; INTRAVENOUS ONCE
OUTPATIENT
Start: 2025-08-17 | End: 2025-08-17

## 2025-02-19 RX ORDER — SODIUM CHLORIDE 9 MG/ML
5-250 INJECTION, SOLUTION INTRAVENOUS PRN
OUTPATIENT
Start: 2025-08-17

## 2025-02-19 RX ORDER — ACETAMINOPHEN 325 MG/1
650 TABLET ORAL ONCE
OUTPATIENT
Start: 2025-08-17 | End: 2025-08-17

## 2025-02-19 RX ORDER — ACETAMINOPHEN 325 MG/1
650 TABLET ORAL ONCE
Status: COMPLETED | OUTPATIENT
Start: 2025-02-19 | End: 2025-02-19

## 2025-02-19 RX ORDER — SODIUM CHLORIDE 9 MG/ML
5-250 INJECTION, SOLUTION INTRAVENOUS PRN
Status: DISCONTINUED | OUTPATIENT
Start: 2025-02-19 | End: 2025-02-20 | Stop reason: HOSPADM

## 2025-02-19 RX ORDER — HYDROCORTISONE SODIUM SUCCINATE 100 MG/2ML
100 INJECTION INTRAMUSCULAR; INTRAVENOUS
OUTPATIENT
Start: 2025-08-17

## 2025-02-19 RX ORDER — EPINEPHRINE 1 MG/ML
0.3 INJECTION, SOLUTION INTRAMUSCULAR; SUBCUTANEOUS PRN
OUTPATIENT
Start: 2025-08-17

## 2025-02-19 RX ORDER — HEPARIN 100 UNIT/ML
500 SYRINGE INTRAVENOUS PRN
OUTPATIENT
Start: 2025-08-17

## 2025-02-19 RX ORDER — DIPHENHYDRAMINE HYDROCHLORIDE 50 MG/ML
50 INJECTION INTRAMUSCULAR; INTRAVENOUS ONCE
Status: COMPLETED | OUTPATIENT
Start: 2025-02-19 | End: 2025-02-19

## 2025-02-19 RX ORDER — ALBUTEROL SULFATE 90 UG/1
4 INHALANT RESPIRATORY (INHALATION) PRN
OUTPATIENT
Start: 2025-08-17

## 2025-02-19 RX ORDER — ONDANSETRON 2 MG/ML
8 INJECTION INTRAMUSCULAR; INTRAVENOUS
OUTPATIENT
Start: 2025-08-17

## 2025-02-19 RX ORDER — SODIUM CHLORIDE 0.9 % (FLUSH) 0.9 %
5-40 SYRINGE (ML) INJECTION PRN
OUTPATIENT
Start: 2025-08-17

## 2025-02-19 RX ORDER — SODIUM CHLORIDE 9 MG/ML
INJECTION, SOLUTION INTRAVENOUS CONTINUOUS
OUTPATIENT
Start: 2025-08-17

## 2025-02-19 RX ORDER — ACETAMINOPHEN 325 MG/1
650 TABLET ORAL
OUTPATIENT
Start: 2025-08-17

## 2025-02-19 RX ADMIN — WATER 125 MG: 1 INJECTION INTRAMUSCULAR; INTRAVENOUS; SUBCUTANEOUS at 08:48

## 2025-02-19 RX ADMIN — ACETAMINOPHEN 650 MG: 325 TABLET ORAL at 08:42

## 2025-02-19 RX ADMIN — DIPHENHYDRAMINE HYDROCHLORIDE 50 MG: 50 INJECTION INTRAMUSCULAR; INTRAVENOUS at 08:48

## 2025-02-19 RX ADMIN — OCRELIZUMAB 600 MG: 300 INJECTION INTRAVENOUS at 09:27

## 2025-02-19 ASSESSMENT — PAIN DESCRIPTION - ORIENTATION: ORIENTATION: RIGHT

## 2025-02-19 ASSESSMENT — PAIN DESCRIPTION - PAIN TYPE: TYPE: ACUTE PAIN

## 2025-02-19 ASSESSMENT — PAIN DESCRIPTION - LOCATION: LOCATION: ARM

## 2025-02-19 ASSESSMENT — PAIN DESCRIPTION - DESCRIPTORS: DESCRIPTORS: ACHING

## 2025-02-19 ASSESSMENT — PAIN SCALES - GENERAL: PAINLEVEL_OUTOF10: 3

## 2025-02-19 NOTE — DISCHARGE INSTRUCTIONS
Ocrevus Discharge Instructions    Can not have an active infection or be on an antibiotic at time of infusion.     Side effects: headache, fatigue, skin itchiness.     Report any unusual diarrhea or severe abdominal pain to your neurologist.     Report any new or worsening neurological signs that have lasted several days such as: problems with thinking, eyesight, strength, and balance.     Next Ocrevus infusion:     Please call [phone] with any questions or concerns.

## 2025-02-19 NOTE — PROGRESS NOTES
0835 pt arrives ambulatory for ocrevus infusion. Infusion explained and questions answered. PT RIGHTS AND RESPONSIBILITIES OFFERED TO PT. Pt denies recent infection or atb use.   0930 infusion initiated. Pt denies needs.   1030 infusion continues. Vitals stable. Pt denies needs.   1158 infusion complete. Pt tolerated it well with no complaints.   1305 vitals stable. Pt discharged ambulatory with cane with instructions with no complaints.             __m__ Safety:       (Environmental)  [place] to environment  Ensure ID band is correct and in place/ allergy band as needed  Assess for fall risk  Initiate fall precautions as applicable (fall band, side rails, etc.)  Call light within reach  Bed in low position/ wheels locked    _m___ Pain:       Assess pain level and characteristics  Administer analgesics as ordered  Assess effectiveness of pain management and report to MD as needed    __m__ Knowledge Deficit:  Assess baseline knowledge  Provide teaching at level of understanding  Provide teaching via preferred learning method  Evaluate teaching effectiveness    __m__ Hemodynamic/Respiratory Status:       (Pre and Post Procedure Monitoring)  Assess/Monitor vital signs and LOC  Assess Baseline SpO2 prior to any sedation  Obtain weight/height  Assess vital signs/ LOC until patient meets discharge criteria  Monitor procedure site and notify MD of any issues

## 2025-05-02 ENCOUNTER — LAB (OUTPATIENT)
Dept: LAB | Age: 65
End: 2025-05-02

## 2025-05-02 DIAGNOSIS — G35 MULTIPLE SCLEROSIS (HCC): ICD-10-CM

## 2025-05-02 DIAGNOSIS — M62.838 MUSCLE SPASMS OF BOTH LOWER EXTREMITIES: ICD-10-CM

## 2025-05-03 LAB
IGA SERPL-MCNC: 457 MG/DL (ref 70–400)
IGG SERPL-MCNC: 899 MG/DL (ref 700–1600)
IGM SERPL-MCNC: 76 MG/DL (ref 40–230)

## 2025-05-04 ENCOUNTER — RESULTS FOLLOW-UP (OUTPATIENT)
Dept: NEUROLOGY | Age: 65
End: 2025-05-04

## 2025-05-05 ENCOUNTER — OFFICE VISIT (OUTPATIENT)
Dept: NEUROLOGY | Age: 65
End: 2025-05-05
Payer: MEDICARE

## 2025-05-05 VITALS
SYSTOLIC BLOOD PRESSURE: 136 MMHG | DIASTOLIC BLOOD PRESSURE: 70 MMHG | BODY MASS INDEX: 29.65 KG/M2 | WEIGHT: 231 LBS | HEIGHT: 74 IN | HEART RATE: 70 BPM

## 2025-05-05 DIAGNOSIS — M62.838 MUSCLE SPASMS OF BOTH LOWER EXTREMITIES: ICD-10-CM

## 2025-05-05 DIAGNOSIS — R29.898 LEFT LEG WEAKNESS: ICD-10-CM

## 2025-05-05 DIAGNOSIS — G35 MULTIPLE SCLEROSIS (HCC): Primary | ICD-10-CM

## 2025-05-05 PROCEDURE — 3078F DIAST BP <80 MM HG: CPT | Performed by: NURSE PRACTITIONER

## 2025-05-05 PROCEDURE — G8427 DOCREV CUR MEDS BY ELIG CLIN: HCPCS | Performed by: NURSE PRACTITIONER

## 2025-05-05 PROCEDURE — 99213 OFFICE O/P EST LOW 20 MIN: CPT | Performed by: NURSE PRACTITIONER

## 2025-05-05 PROCEDURE — 3017F COLORECTAL CA SCREEN DOC REV: CPT | Performed by: NURSE PRACTITIONER

## 2025-05-05 PROCEDURE — G8417 CALC BMI ABV UP PARAM F/U: HCPCS | Performed by: NURSE PRACTITIONER

## 2025-05-05 PROCEDURE — 1036F TOBACCO NON-USER: CPT | Performed by: NURSE PRACTITIONER

## 2025-05-05 PROCEDURE — 3075F SYST BP GE 130 - 139MM HG: CPT | Performed by: NURSE PRACTITIONER

## 2025-05-05 RX ORDER — BACLOFEN 20 MG/1
20 TABLET ORAL 2 TIMES DAILY
Qty: 60 TABLET | Refills: 5 | Status: SHIPPED | OUTPATIENT
Start: 2025-05-05

## 2025-05-05 NOTE — PATIENT INSTRUCTIONS
Continue with Ocrevus infusions  CBC, CMP  Continue Baclofen  20 mg twice a day. Refills given.   Take calcium and Vitamin D daily.   Stay active   Report any new symptoms.  Follow up in 6 months or sooner if needed.

## 2025-05-05 NOTE — PROGRESS NOTES
spasm.    COMPARISON: MRI scan of the brain dated 8/1/2022..    TECHNIQUE: Multiplanar and multiple spin echo T1 and T2-weighted images were  obtained through the brain before and after the administration of intravenous  contrast.    FINDINGS:        The diffusion-weighted images are normal. The brain volume is normal .There are  no intra-or extra-axial collections.  There is no hydrocephalus, midline shift  or mass effect.      On the FLAIR and T2-weighted sequences, there is moderate increased signal  intensity in the white matter consistent with demyelinating disease,  approximately unchanged. There are no new plaques noted..    On the gradient echo T2-weighted images, there are no areas of susceptibility  artifact noted.    There is no abnormal enhancement in the brain. The major intracranial vascular  flow voids are present.  The midline craniocervical junction structures are  normal.  The brainstem and pituitary gland are normal.      There is slight increased signal intensity in the left mastoid tip consistent  with inflammatory changes.    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    Impression  1. Stable MRI scan of the brain with and without intravenous contrast, no  interval change since previous study dated 8/1/2022.  2. There are multiple plaques in the white matter, approximately unchanged since  previous study.  3. There are no definite new plaques, restricted diffusion or abnormal  enhancement noted.  4. There is slight increased signal intensity in the left mastoid tip consistent  with inflammatory changes..          **This report has been created using voice recognition software. It may contain  minor errors which are inherent in voice recognition technology.**          Electronically signed by Dr. Velasquez Angeles              Assessment:     Diagnosis Orders   1. Multiple sclerosis (HCC)  Comprehensive Metabolic Panel    CBC with Auto

## 2025-05-09 ENCOUNTER — OFFICE VISIT (OUTPATIENT)
Dept: PULMONOLOGY | Age: 65
End: 2025-05-09

## 2025-05-09 VITALS
OXYGEN SATURATION: 96 % | WEIGHT: 231 LBS | HEIGHT: 74 IN | TEMPERATURE: 98.2 F | SYSTOLIC BLOOD PRESSURE: 138 MMHG | BODY MASS INDEX: 29.65 KG/M2 | DIASTOLIC BLOOD PRESSURE: 80 MMHG | HEART RATE: 67 BPM

## 2025-05-09 DIAGNOSIS — Z87.891 PERSONAL HISTORY OF TOBACCO USE: Primary | ICD-10-CM

## 2025-05-09 DIAGNOSIS — J44.9 COPD, SEVERE (HCC): ICD-10-CM

## 2025-05-09 DIAGNOSIS — J44.9 STAGE 2 MODERATE COPD BY GOLD CLASSIFICATION (HCC): ICD-10-CM

## 2025-05-09 DIAGNOSIS — Z87.898 PERSONAL HISTORY OF SOLITARY PULMONARY NODULE: ICD-10-CM

## 2025-05-09 RX ORDER — OCRELIZUMAB AND HYALURONIDASE 23000; 920 U/23ML; MG/23ML
INJECTION, SOLUTION SUBCUTANEOUS
COMMUNITY
Start: 2025-02-14

## 2025-05-09 RX ORDER — NIFEDIPINE 90 MG/1
TABLET, FILM COATED, EXTENDED RELEASE ORAL
COMMUNITY
Start: 2025-04-25

## 2025-05-09 RX ORDER — ALBUTEROL SULFATE 90 UG/1
2 INHALANT RESPIRATORY (INHALATION) 4 TIMES DAILY PRN
Qty: 18 G | Refills: 5 | Status: SHIPPED | OUTPATIENT
Start: 2025-05-09

## 2025-05-09 RX ORDER — UMECLIDINIUM 62.5 UG/1
1 AEROSOL, POWDER ORAL DAILY
Qty: 90 EACH | Refills: 0 | Status: SHIPPED | OUTPATIENT
Start: 2025-05-09

## 2025-05-09 ASSESSMENT — ENCOUNTER SYMPTOMS
WHEEZING: 0
SHORTNESS OF BREATH: 1
COUGH: 1
SORE THROAT: 0
RHINORRHEA: 0
CHEST TIGHTNESS: 0

## 2025-05-09 NOTE — PROGRESS NOTES
[facility]     Patient: GASTON HAINES, 64 y.o.   : 1960  Date of encounter: 2025   Previously seen by Dr. Barry, Nyla Amin, CNP     Subjective     Patient presents for 1 year COPD  follow up   Gaston presents to the pulmonary clinic today alone with no acute concerns or complaints.  Reports chronic shortness of breath that has not changed or worsened in the last year.  Denies any associated wheezing, reports occasional cough throughout the day occasionally productive of whitish sputum occasionally green tinge.  Denies hemoptysis, chest tightness, chest pain.  Denies any allergy symptoms at this time, not currently taking any allergy medications.  Reports compliance with his Incruse Ellipta, using albuterol approximately every other day, primarily in the evening.  Per chart review Gaston is a 65 y/o male past smoker who follows with the pulmonary clinic for COPD. Has never been tested for A1A in the past.      Progress History:   - Since last visit any new medical issues? Kidney stones  - New ER or hospital visits? Yes related to kidney stones  - Any new or changes in medicines? No  - Using inhalers? Yes   - Are they helpful? Yes      Immunization History   Administered Date(s) Administered    COVID-19, [place] Vaccine, Vaccine Unspecified 2021, 2021    Influenza Virus Vaccine 10/11/2018, 10/17/2019, 10/13/2022    Influenza, FLUARIX, FLULAVAL, FLUZONE (age 6 mo+) and AFLURIA, (age 3 y+), Quadv PF, 0.5mL 10/17/2023    Pneumococcal, PCV20, PREVNAR 20, (age 6w+), IM, 0.5mL 10/17/2023       SOCIAL HISTORY:  Social History     Tobacco Use    Smoking status: Former     Current packs/day: 0.00     Average packs/day: 3.0 packs/day for 35.0 years (105.0 ttl pk-yrs)     Types: Cigarettes     Start date: 1973     Quit date: 2008     Years since quittin.6    Smokeless tobacco: Never   Vaping Use    Vaping status: Never Used   Substance Use Topics

## 2025-07-17 ENCOUNTER — HOSPITAL ENCOUNTER (OUTPATIENT)
Age: 65
Setting detail: SPECIMEN
End: 2025-07-17

## 2025-07-17 ENCOUNTER — HOSPITAL ENCOUNTER (OUTPATIENT)
Age: 65
Discharge: HOME | End: 2025-07-17
Payer: MEDICARE

## 2025-07-17 LAB
ALBUMIN SERPL BCG-MCNC: 4.1 G/DL (ref 3.4–4.9)
ALP SERPL-CCNC: 100 U/L (ref 40–129)
ALT SERPL W/O P-5'-P-CCNC: 19 U/L (ref 10–50)
ANION GAP SERPL CALC-SCNC: 14 MEQ/L (ref 8–16)
AST SERPL-CCNC: 18 U/L (ref 10–50)
BASOPHILS ABSOLUTE: 0.1 THOU/MM3 (ref 0–0.1)
BASOPHILS NFR BLD AUTO: 0.9 %
BILIRUB CONJ SERPL-MCNC: < 0.1 MG/DL (ref 0–0.2)
BILIRUB SERPL-MCNC: 0.3 MG/DL (ref 0.3–1.2)
BUN SERPL-MCNC: 10 MG/DL (ref 8–23)
CALCIUM SERPL-MCNC: 9.5 MG/DL (ref 8.8–10.2)
CHLORIDE SERPL-SCNC: 102 MEQ/L (ref 98–111)
CHOLEST SERPL-MCNC: 154 MG/DL (ref 100–199)
CO2 SERPL-SCNC: 26 MEQ/L (ref 22–29)
CREAT SERPL-MCNC: 0.7 MG/DL (ref 0.7–1.2)
CREAT UR-MCNC: 44 MG/DL
DEPRECATED MEAN GLUCOSE BLD GHB EST-ACNC: 150 MG/DL (ref 70–126)
DEPRECATED RDW RBC AUTO: 45.5 FL (ref 35–45)
EOSINOPHIL NFR BLD AUTO: 3.2 %
EOSINOPHILS ABSOLUTE: 0.3 THOU/MM3 (ref 0–0.4)
ERYTHROCYTE [DISTWIDTH] IN BLOOD BY AUTOMATED COUNT: 13.3 % (ref 11.5–14.5)
GFR SERPL CREATININE-BSD FRML MDRD: > 90 ML/MIN/1.73M2
GLUCOSE SERPL-MCNC: 159 MG/DL (ref 74–109)
HBA1C MFR BLD HPLC: 7 % (ref 4–6)
HCT VFR BLD AUTO: 48.6 % (ref 42–52)
HDLC SERPL-MCNC: 41 MG/DL
HGB BLD-MCNC: 15.8 GM/DL (ref 14–18)
IMM GRANULOCYTES # BLD AUTO: 0.03 THOU/MM3 (ref 0–0.07)
IMM GRANULOCYTES NFR BLD AUTO: 0.3 %
LDLC SERPL CALC-MCNC: 99 MG/DL
LYMPHOCYTES ABSOLUTE: 1.5 THOU/MM3 (ref 1–4.8)
LYMPHOCYTES NFR BLD AUTO: 15.2 %
MCH RBC QN AUTO: 30 PG (ref 26–33)
MCHC RBC AUTO-ENTMCNC: 32.5 GM/DL (ref 32.2–35.5)
MCV RBC AUTO: 92.4 FL (ref 80–94)
MICROALBUMIN UR-MCNC: < 2 MG/DL
MICROALBUMIN/CREAT RATIO PNL UR: 45 MG/G (ref 0–30)
MONOCYTES ABSOLUTE: 1.1 THOU/MM3 (ref 0.4–1.3)
MONOCYTES NFR BLD AUTO: 10.7 %
NEUTROPHILS ABSOLUTE: 7 THOU/MM3 (ref 1.8–7.7)
NEUTROPHILS NFR BLD AUTO: 69.7 %
NRBC BLD AUTO-RTO: 0 /100 WBC
PLATELET # BLD AUTO: 333 THOU/MM3 (ref 130–400)
PMV BLD AUTO: 9.3 FL (ref 9.4–12.4)
POTASSIUM SERPL-SCNC: 4.4 MEQ/L (ref 3.5–5.2)
PROT SERPL-MCNC: 7.3 G/DL (ref 6.4–8.3)
PSA SERPL-MCNC: 1.14 NG/ML (ref 0–1)
RBC # BLD AUTO: 5.26 MILL/MM3 (ref 4.7–6.1)
SODIUM SERPL-SCNC: 142 MEQ/L (ref 135–145)
TRIGL SERPL-MCNC: 72 MG/DL (ref 0–199)
WBC # BLD AUTO: 10 THOU/MM3 (ref 4.8–10.8)

## 2025-07-17 PROCEDURE — 82043 UR ALBUMIN QUANTITATIVE: CPT

## 2025-07-17 PROCEDURE — 83036 HEMOGLOBIN GLYCOSYLATED A1C: CPT

## 2025-07-17 PROCEDURE — 36415 COLL VENOUS BLD VENIPUNCTURE: CPT

## 2025-07-17 PROCEDURE — 80061 LIPID PANEL: CPT

## 2025-07-17 PROCEDURE — 85025 COMPLETE CBC W/AUTO DIFF WBC: CPT

## 2025-07-17 PROCEDURE — 80053 COMPREHEN METABOLIC PANEL: CPT

## 2025-07-17 PROCEDURE — 82248 BILIRUBIN DIRECT: CPT

## 2025-07-17 PROCEDURE — G0103 PSA SCREENING: HCPCS

## 2025-08-19 ENCOUNTER — HOSPITAL ENCOUNTER (OUTPATIENT)
Dept: NURSING | Age: 65
Discharge: HOME | End: 2025-08-19
Payer: MEDICARE

## 2025-08-19 VITALS
RESPIRATION RATE: 18 BRPM | WEIGHT: 230 LBS | OXYGEN SATURATION: 93 % | HEART RATE: 75 BPM | BODY MASS INDEX: 29.53 KG/M2 | TEMPERATURE: 98 F | DIASTOLIC BLOOD PRESSURE: 95 MMHG | SYSTOLIC BLOOD PRESSURE: 116 MMHG

## 2025-08-19 DIAGNOSIS — G35 MULTIPLE SCLEROSIS, RELAPSING-REMITTING (HCC): Primary | ICD-10-CM

## 2025-08-19 PROCEDURE — 99211 OFF/OP EST MAY X REQ PHY/QHP: CPT

## 2025-08-19 PROCEDURE — 6360000002 HC RX W HCPCS: Performed by: PSYCHIATRY & NEUROLOGY

## 2025-08-19 PROCEDURE — 96366 THER/PROPH/DIAG IV INF ADDON: CPT

## 2025-08-19 PROCEDURE — 2500000003 HC RX 250 WO HCPCS: Performed by: PSYCHIATRY & NEUROLOGY

## 2025-08-19 PROCEDURE — 96375 TX/PRO/DX INJ NEW DRUG ADDON: CPT

## 2025-08-19 PROCEDURE — 2580000003 HC RX 258: Performed by: PSYCHIATRY & NEUROLOGY

## 2025-08-19 PROCEDURE — 6370000000 HC RX 637 (ALT 250 FOR IP): Performed by: PSYCHIATRY & NEUROLOGY

## 2025-08-19 PROCEDURE — 96365 THER/PROPH/DIAG IV INF INIT: CPT

## 2025-08-19 RX ORDER — SODIUM CHLORIDE 0.9 % (FLUSH) 0.9 %
5-40 SYRINGE (ML) INJECTION PRN
Status: DISCONTINUED | OUTPATIENT
Start: 2025-08-19 | End: 2025-08-20 | Stop reason: HOSPADM

## 2025-08-19 RX ORDER — SODIUM CHLORIDE 9 MG/ML
INJECTION, SOLUTION INTRAVENOUS CONTINUOUS
OUTPATIENT
Start: 2026-02-15

## 2025-08-19 RX ORDER — DIPHENHYDRAMINE HYDROCHLORIDE 50 MG/ML
50 INJECTION, SOLUTION INTRAMUSCULAR; INTRAVENOUS
OUTPATIENT
Start: 2026-02-15

## 2025-08-19 RX ORDER — EPINEPHRINE 1 MG/ML
0.3 INJECTION, SOLUTION INTRAMUSCULAR; SUBCUTANEOUS PRN
OUTPATIENT
Start: 2026-02-15

## 2025-08-19 RX ORDER — ACETAMINOPHEN 325 MG/1
650 TABLET ORAL
OUTPATIENT
Start: 2026-02-15

## 2025-08-19 RX ORDER — ACETAMINOPHEN 325 MG/1
650 TABLET ORAL ONCE
Status: COMPLETED | OUTPATIENT
Start: 2025-08-19 | End: 2025-08-19

## 2025-08-19 RX ORDER — HYDROCORTISONE SODIUM SUCCINATE 100 MG/2ML
100 INJECTION INTRAMUSCULAR; INTRAVENOUS
OUTPATIENT
Start: 2026-02-15

## 2025-08-19 RX ORDER — ONDANSETRON 2 MG/ML
8 INJECTION INTRAMUSCULAR; INTRAVENOUS
OUTPATIENT
Start: 2026-02-15

## 2025-08-19 RX ORDER — DIPHENHYDRAMINE HYDROCHLORIDE 50 MG/ML
50 INJECTION, SOLUTION INTRAMUSCULAR; INTRAVENOUS ONCE
OUTPATIENT
Start: 2026-02-15 | End: 2026-02-15

## 2025-08-19 RX ORDER — SODIUM CHLORIDE 9 MG/ML
5-250 INJECTION, SOLUTION INTRAVENOUS PRN
OUTPATIENT
Start: 2026-02-15

## 2025-08-19 RX ORDER — SODIUM CHLORIDE 0.9 % (FLUSH) 0.9 %
5-40 SYRINGE (ML) INJECTION PRN
OUTPATIENT
Start: 2026-02-15

## 2025-08-19 RX ORDER — ACETAMINOPHEN 325 MG/1
650 TABLET ORAL ONCE
OUTPATIENT
Start: 2026-02-15 | End: 2026-02-15

## 2025-08-19 RX ORDER — ALBUTEROL SULFATE 90 UG/1
4 INHALANT RESPIRATORY (INHALATION) PRN
OUTPATIENT
Start: 2026-02-15

## 2025-08-19 RX ORDER — HEPARIN 100 UNIT/ML
500 SYRINGE INTRAVENOUS PRN
OUTPATIENT
Start: 2026-02-15

## 2025-08-19 RX ORDER — DIPHENHYDRAMINE HYDROCHLORIDE 50 MG/ML
50 INJECTION, SOLUTION INTRAMUSCULAR; INTRAVENOUS ONCE
Status: COMPLETED | OUTPATIENT
Start: 2025-08-19 | End: 2025-08-19

## 2025-08-19 RX ORDER — SODIUM CHLORIDE 9 MG/ML
5-250 INJECTION, SOLUTION INTRAVENOUS PRN
Status: DISCONTINUED | OUTPATIENT
Start: 2025-08-19 | End: 2025-08-20 | Stop reason: HOSPADM

## 2025-08-19 RX ADMIN — DIPHENHYDRAMINE HYDROCHLORIDE 50 MG: 50 INJECTION INTRAMUSCULAR; INTRAVENOUS at 09:03

## 2025-08-19 RX ADMIN — SODIUM CHLORIDE 20 ML/HR: 0.9 INJECTION, SOLUTION INTRAVENOUS at 08:51

## 2025-08-19 RX ADMIN — OCRELIZUMAB 600 MG: 300 INJECTION INTRAVENOUS at 09:44

## 2025-08-19 RX ADMIN — ACETAMINOPHEN 650 MG: 325 TABLET ORAL at 09:00

## 2025-08-19 RX ADMIN — WATER 125 MG: 1 INJECTION INTRAMUSCULAR; INTRAVENOUS; SUBCUTANEOUS at 09:01

## 2025-08-19 ASSESSMENT — PAIN DESCRIPTION - LOCATION: LOCATION: GENERALIZED

## 2025-08-19 ASSESSMENT — PAIN SCALES - GENERAL: PAINLEVEL_OUTOF10: 1

## (undated) DEVICE — CYSTO: Brand: MEDLINE INDUSTRIES, INC.

## (undated) DEVICE — DUAL LUMEN URETERAL CATHETER

## (undated) DEVICE — GUIDEWIRE URO L150CM DIA .035IN STIFF NIT HYDRPHL STR TIP

## (undated) DEVICE — SINGLE ACTION PUMPING SYSTEM

## (undated) DEVICE — FIBER LASER SURG FIBER 1000 MH RND TIP HOLM SMARTSCOPE DISP

## (undated) DEVICE — ADAPTER URO SCP UROLOK LL

## (undated) DEVICE — SINGLE-USE DIGITAL FLEXIBLE URETEROSCOPE: Brand: LITHOVUE

## (undated) DEVICE — FIBER LASER HOLM DISP SU200RT] LEONI FIBER OPTICS INC]

## (undated) DEVICE — FLEXIVA  PULSE  AND  FLEXIVA  PULSE  TRACTIP  LASER  FIBERS  ARE  HIGH  POWER  SINGLE-USE FIBER: Brand: FLEXIVA PULSE ID

## (undated) DEVICE — STRIP,CLOSURE,WOUND,MEDI-STRIP,1/2X4: Brand: MEDLINE

## (undated) DEVICE — ADAPTER URETSCP Y TYP ROT M LUER CONN TUOHY BORST VLV BLU

## (undated) DEVICE — SOLUTION IRRIG 3000ML 0.9% SOD CHL USP UROMATIC PLAS CONT